# Patient Record
Sex: FEMALE | Race: WHITE | NOT HISPANIC OR LATINO | Employment: UNEMPLOYED | URBAN - METROPOLITAN AREA
[De-identification: names, ages, dates, MRNs, and addresses within clinical notes are randomized per-mention and may not be internally consistent; named-entity substitution may affect disease eponyms.]

---

## 2017-05-19 ENCOUNTER — HOSPITAL ENCOUNTER (OUTPATIENT)
Dept: ULTRASOUND IMAGING | Facility: HOSPITAL | Age: 22
Discharge: HOME | End: 2017-05-19
Attending: FAMILY MEDICINE | Admitting: FAMILY MEDICINE

## 2019-05-29 ENCOUNTER — CONVERSION ENCOUNTER (OUTPATIENT)
Dept: FAMILY MEDICINE CLINIC | Facility: CLINIC | Age: 24
End: 2019-05-29

## 2019-06-04 VITALS
BODY MASS INDEX: 51.96 KG/M2 | DIASTOLIC BLOOD PRESSURE: 82 MMHG | SYSTOLIC BLOOD PRESSURE: 129 MMHG | OXYGEN SATURATION: 97 % | HEIGHT: 61 IN | HEART RATE: 77 BPM | WEIGHT: 275.2 LBS

## 2019-06-06 NOTE — PROGRESS NOTES
Visit Type:  Acute Visit  Referring Provider:  HALEY Alvarez  Primary Provider:  Karen    CC:  abdomen pain, shoulder pain, anxiety/depression and obesity.    History of Present Illness:  Pt is here today with abdominal pain, anxiety, and right shoulder pain. Pt was on nexium in the past before she had her son which helped her nausea and GERD, but she stopped when she became pregnant. She states that she had issues with her gall bladder   when she was younger, and she was concerned that it could be related to that.  Pt states that she is having frequent diarrhea.  She has BMs 4-5 times a day.  Sometimes they are formed.  She states that she has to have a BM within 30 mins of eating. She has   tried changing her diet around by avoiding caffeine, carbination, dairy, fatty foods, and spicy foods.  Pain has improved some, but she is still experiencing some of the nausea and pain.  Pain is a 5/10 everytime she eats, and is in the RUQ and epigastric   region. Her mother has Crohns.    Pt is requesting a referral to a therapist for increased anxiety/depression.  Her father was in an accident earlier this year and she has had issues coping with it. She states that she has struggled with depression since she was a teenager.  She denies   having thoughts of suicide at this time, but has in the past.      Pt also has c/o right shoulder pain.  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  She woke up one day in extreme pain and was not able to move her shoulder well.  In January she was given muscle relaxers and naproxen, which helped,   but she is still experiencing soreness daily. She recently moved, and the pain has worsened from the lifting.       Past Medical History:     Reviewed history from 11/06/2014 and no changes required:        ADHD        Hypertension        Obesity        GERD    Past Surgical History:     Reviewed history from 09/27/2017 and no changes required:        Left  foot - took out plantars warts        Right foot - shots for plantars warts, acid           [facility]        Social History:     Reviewed history from 2017 and no changes required:        Patient is a former smoker.        Passive Smoke: N        Alcohol Use: N        Drug Use: N        Regular Exercise: N            Social History Summary:  Patient is a former smoker.  Passive Smoke: N  Alcohol Use: N  Drug Use: N  Regular Exercise: N  Social History Reviewed: 2019        Active Medications (reviewed today):  TETRACYCLINE  MG ORAL CAPSULE (TETRACYCLINE HCL) 1 p.o. twice a day for 10 days START 5 days before surgery  ADDERALL XR 20 MG ORAL CAPSULE EXTENDED RELEASE 24 HOUR (AMPHETAMINE-DEXTROAMPHETAMINE) take 1 po daily    Current Allergies:  No known allergies    Current Medications (including medications started today):   LEXAPRO 10 MG ORAL TABLET (ESCITALOPRAM OXALATE) Take 1 tablet by mouth daily  OMEPRAZOLE 20 MG ORAL CAPSULE DELAYED RELEASE (OMEPRAZOLE) Take one by mouth daily  CYCLOBENZAPRINE HCL 5 MG ORAL TABLET (CYCLOBENZAPRINE HCL) take 1 tablet every 8 hours as needed for muscle spasms      Risk Factors:     Smoked Tobacco Use:  Former smoker  Passive smoke exposure:  no  Drug use:  no  Caffeine use:  <1 drinks per day  Alcohol use:  no  Exercise:  no  Seatbelt use:  100 %  Sun Exposure:  occasionally      Review of Systems     General       Denies fever and chills.    Eyes       Denies eye irritation, blurring and eye pain.    CV       Denies chest pain or discomfort, racing/skipping heart beats, lightheadedness and palpitations.    Resp       Denies cough, shortness of breath, chest discomfort and wheezing.    GI       Complains of nausea, abdominal pain and diarrhea.           Denies kidney pain and painful urination.    MS       Complains of joint pain.    Derm       Denies rash.    Neuro       Denies numbness and tingling.    Psych       Complains of anxiety and  depression.       Denies thoughts of suicide.    Allergy       Denies seasonal allergies.      Vital Signs:    Patient Profile:    23 Years Old Female  Height:     61 inches (154.94 cm)  Weight:     275.2 pounds  BMI:        51.99     O2 Sat:     97 %  Temp:       97.8 degrees F oral  Pulse rate: 77 / minute  BP Sittin / 82  (left arm)    Cuff size:  large    Medications: Medications were reviewed with the patient during this visit.  No Known Allergy.        Vitals Entered By: Maribel Horvath CMA (May 29, 2019 3:22 PM)      Physical Exam    General:      well developed, well nourished, in no acute distress.  Obese  Head:      normocephalic and atraumatic.    Eyes:      PERRL/EOM intact, conjunctiva and sclera clear with out nystagmus.    Neck:      no masses, thyromegaly, or abnormal cervical nodes.    Lungs:      clear bilaterally to auscultation.    Heart:      non-displaced PMI, chest non-tender; regular rate and rhythm, S1, S2 without murmurs, rubs, or gallops  Abdomen:       normal bowel sounds; no hepatosplenomegaly no ventral,umbilical hernias or masses noted.  tender RUQ and epigastric  Msk:      no crepitus with movement of right shoulder.  Pain upon palpation of right posterior shoulder.  Normal ROM.  Pulses:      normal radial pulses  Neurologic:      no focal deficits  Skin:      intact without lesions or rashes.    Psych:      depressed affect.  tearful.    Diabetes Management Exam:      Foot Exam (with socks and/or shoes not present):        Pulses:           normal radial pulses      Blood Pressure:  Today's BP: 129/82 mm Hg            Impression & Recommendations:    Problem # 1:  Depression / anxiety (ICD-300.4) (ZQS85-P56.8)  Assessment: New  major severe depression   denies suicidal ideation  start lexapro  follow up in 1 month  referral to counseling  Orders:  Counseling Referral (CounsREF)  [facility] THYROID STIMULATING HORMONE (TSH) (TSH)      Problem # 2:  Shoulder pain, right (ICD-719.41)  (CZO92-S05.511)  Assessment: New  most like muscular   alternate ibuprofen and tylenol every 6 hours  PT referral  flexeril as needed    Problem # 3:  ABDOMINAL PAIN (ICD-789.00) (DMT65-Y59.9)  Assessment: New  unknown etiology- possibly GERD or Gallbladder  US RUQ  start omeprazole daily   Avoid overeating, eat small portions at meals and snacks. Avoid chewing gum, chocolate, caffeine, spicy foods, alcohol, coffee, peppermint, and acidic foods.  Elevate head of bed. Avoid eating within 2 hours of bedtime.  Sit up for 30 minutes after eating   meals.  obtain labs    Her updated medication list for this problem includes:     Cyclobenzaprine Hcl 5 Mg Oral Tablet (Cyclobenzaprine hcl) ..... Take 1 tablet every 8 hours as needed for muscle spasms    Orders:  US RIGHT UPPER QUANDRANT (Gallbladder) (CPT-36499)      Problem # 4:  Obesity (ICD-278.00) (WLR77-O58.9)  Assessment: New  obtain labs  work on diet and exercise    Orders:  [facility] THYROID STIMULATING HORMONE (TSH) (TSH)  [facility] CBC W/DIFF; PATH REVIEW IF INDICATED (CBC)  [facility] HEMOGLOBIN A1c (A1DCA)  [facility] LIPID PANEL (LIPID)  [facility] COMPREHENSIVE METABOLIC PANEL (CMP) (MPC)      Medications Added to Medication List This Visit:  1)  Lexapro 10 Mg Oral Tablet (Escitalopram oxalate) .... Take 1 tablet by mouth daily  2)  Omeprazole 20 Mg Oral Capsule Delayed Release (Omeprazole) .... Take one by mouth daily  3)  Cyclobenzaprine Hcl 5 Mg Oral Tablet (Cyclobenzaprine hcl) .... Take 1 tablet every 8 hours as needed for muscle spasms      Patient Instructions:  1)  follow up in 1 month  2)  start taking lexapro daily- will take 4-6 weeks to see full effects  3)  call for worsening symptoms or thoughts of suicide  4)  referral to [facility]  5)  Start taking omeprazole daily   6)  Avoid overeating, eat small portions at meals and snacks. Avoid chewing gum, chocolate, caffeine, spicy foods, alcohol, coffee, peppermint, and acidic foods.  Elevate head of bed. Avoid eating within 2 hours  of bedtime.  Sit up for 30 minutes after   eating meals.  7)  RUQ US ordered  8)  obtain labs  9)  Flexeril as needed for muscle spasms  10)  physical therapy for shoulder pain  11)  warm compress as needed  12)  alternate ibuprofen and tylenol for pain and inflammation every 6 hours  13)  please give a work note stating that patient may need multiple breaks a day for medical necessity.            Medications:  LEXAPRO 10 MG ORAL TABLET (ESCITALOPRAM OXALATE) Take 1 tablet by mouth daily  #30[Tablet] x 1      Entered and Authorized by:  Silvana DE LEON      Electronically signed by:   Silvana DE LEON on 05/29/2019      Method used:    Electronically [facility]               [facility] [id number] (retail)              [address], [place]  [id number]              Ph: [phone]              Fax: [phone]      Note to Pharmacy: Route: ORAL;       RxID:   [id number]  OMEPRAZOLE 20 MG ORAL CAPSULE DELAYED RELEASE (OMEPRAZOLE) Take one by mouth daily  #30[Capsule] x 0      Entered and Authorized by:  Silvana DE LEON      Electronically signed by:   Silvana DE LEON on 05/29/2019      Method used:    [facility]               [facility] [id number] ([place])              [address], [place]  [id number]              Ph: [phone]              Fax: [phone]      Note to Pharmacy: Route: ORAL;       RxID:   [id number]  CYCLOBENZAPRINE HCL 5 MG ORAL TABLET (CYCLOBENZAPRINE HCL) take 1 tablet every 8 hours as needed for muscle spasms  #30[Tablet] x 0      Entered and Authorized by:  Silvana DE LEON      Electronically signed by:   Silvana DE LEON on 05/29/2019      Method used:    Electronically to               [facility] [id number]* (retail)              [address] [facility], [place]  [id number]              Ph: [phone]              Fax: [phone]      Note to Pharmacy: Route:  ORAL;       RxID:   [id number]                Medication Administration    Orders Added:  1)  Ofc Vst, Est Level IV [70301]  2)  Counseling Referral [CounsREF]  3)  Physical Therapy Consult [PTOC]  4)  [facility] THYROID STIMULATING HORMONE (TSH) [TSH]  5)  [facility] CBC W/DIFF; PATH REVIEW IF INDICATED [CBC]  6)  [facility] HEMOGLOBIN A1c [A1DCA]  7)  [facility] LIPID PANEL [LIPID]  8)  [facility] COMPREHENSIVE METABOLIC PANEL (CMP) [MPC]  9)   RIGHT UPPER QUANDRANT (Gallbladder) [CPT-92464]  ]    Patient Health Questionnaire    PHQ-2   1. Over the last 2 weeks how often have you been bothered by any of the following problems?     a. Little interest or pleasure in doing things?...More than half the days     b. Feeling down, depressed, irritable, or hopeless?...Nearly every day    Total PHQ-2 Score: 5       c. Trouble falling asleep, staying asleep, or sleeping too much?...Not at all     d. Feeling tired, or having little energy?...Nearly every day     e. Poor appetite or overeating?...Nearly every day     f.  Feeling bad about yourself - or feeling that you are a failure, or that you have let yourself or your family         down?...Nearly every day     g. Trouble concentrating on things such as reading the newspaper or watching television?...Nearly every day     h. Moving or speaking so slowly that other people could have noticed. Or the opposite - being so fidgety or          restless that you were moving around a lot more than usual?...Nearly every day     i.  Thoughts that you would be better off dead, or of hurting yourself in some way?...Nearly every day    Total PHQ-9 Score: 23    2. If you are experiencing any of the problems on this form, how difficult have these problems made it for you to do your work, take care of things at home or get along with other people?   ...Very difficult    Problems:    Added new problem of Depression / anxiety (ICD-300.4) (QAD95-W33.8)  Added new problem of Obesity (ICD-278.00) (VQE18-N00.9)  Added new  problem of Shoulder pain, right (ICD-719.41) (DPO24-P41.511)          Electronically signed by Silvana DE LEON on 05/29/2019 at 9:17 PM  ________________________________________________________________________       Disclaimer: Converted Note message may not contain all data elements that existed in the legacy source system. Please see [employer] for the original note details.

## 2019-06-25 RX ORDER — OMEPRAZOLE 20 MG/1
CAPSULE, DELAYED RELEASE ORAL
Qty: 30 CAPSULE | Refills: 0 | Status: SHIPPED | OUTPATIENT
Start: 2019-06-25 | End: 2020-01-10

## 2019-06-26 PROBLEM — N60.01 BREAST CYST, RIGHT: Status: ACTIVE | Noted: 2017-08-24

## 2019-06-26 PROBLEM — F41.8 MIXED ANXIETY DEPRESSIVE DISORDER: Status: ACTIVE | Noted: 2019-05-29

## 2019-06-26 PROBLEM — K21.9 GASTROESOPHAGEAL REFLUX DISEASE: Status: ACTIVE | Noted: 2017-08-24

## 2019-06-26 PROBLEM — M25.511 SHOULDER PAIN, RIGHT: Status: ACTIVE | Noted: 2019-05-29

## 2019-06-26 PROBLEM — F90.9 ADHD: Status: ACTIVE | Noted: 2019-06-26

## 2019-07-09 ENCOUNTER — TELEPHONE (OUTPATIENT)
Dept: FAMILY MEDICINE CLINIC | Facility: CLINIC | Age: 24
End: 2019-07-09

## 2019-07-09 NOTE — TELEPHONE ENCOUNTER
June 26, 2019 would have been her 3rd No Show. 06/12/19 (appt with you) and 12/21/17(appt with Abril). Warning letters were sent. Do you want to dismiss or send a Final Warning Letter?

## 2019-07-10 ENCOUNTER — TELEPHONE (OUTPATIENT)
Dept: FAMILY MEDICINE CLINIC | Facility: CLINIC | Age: 24
End: 2019-07-10

## 2019-07-11 ENCOUNTER — TELEPHONE (OUTPATIENT)
Dept: FAMILY MEDICINE CLINIC | Facility: CLINIC | Age: 24
End: 2019-07-11

## 2019-07-15 ENCOUNTER — TELEPHONE (OUTPATIENT)
Dept: FAMILY MEDICINE CLINIC | Facility: CLINIC | Age: 24
End: 2019-07-15

## 2019-07-15 DIAGNOSIS — Z34.90 PREGNANCY, UNSPECIFIED GESTATIONAL AGE: Primary | ICD-10-CM

## 2019-07-16 ENCOUNTER — LAB (OUTPATIENT)
Dept: FAMILY MEDICINE CLINIC | Facility: CLINIC | Age: 24
End: 2019-07-16

## 2019-07-16 DIAGNOSIS — Z34.90 PREGNANCY, UNSPECIFIED GESTATIONAL AGE: ICD-10-CM

## 2019-07-16 LAB — HCG INTACT+B SERPL-ACNC: <1 MIU/ML

## 2019-07-16 PROCEDURE — 84702 CHORIONIC GONADOTROPIN TEST: CPT | Performed by: FAMILY MEDICINE

## 2019-07-16 PROCEDURE — 36415 COLL VENOUS BLD VENIPUNCTURE: CPT | Performed by: FAMILY MEDICINE

## 2019-07-17 ENCOUNTER — OFFICE VISIT (OUTPATIENT)
Dept: FAMILY MEDICINE CLINIC | Facility: CLINIC | Age: 24
End: 2019-07-17

## 2019-07-17 VITALS
HEIGHT: 61 IN | WEIGHT: 278 LBS | DIASTOLIC BLOOD PRESSURE: 82 MMHG | BODY MASS INDEX: 52.49 KG/M2 | OXYGEN SATURATION: 97 % | HEART RATE: 83 BPM | SYSTOLIC BLOOD PRESSURE: 118 MMHG

## 2019-07-17 DIAGNOSIS — N92.6 IRREGULAR MENSTRUAL CYCLE: Primary | ICD-10-CM

## 2019-07-17 DIAGNOSIS — Z30.013 ENCOUNTER FOR INITIAL PRESCRIPTION OF INJECTABLE CONTRACEPTIVE: ICD-10-CM

## 2019-07-17 DIAGNOSIS — R11.0 NAUSEA: ICD-10-CM

## 2019-07-17 DIAGNOSIS — F41.8 MIXED ANXIETY DEPRESSIVE DISORDER: ICD-10-CM

## 2019-07-17 PROCEDURE — 99214 OFFICE O/P EST MOD 30 MIN: CPT | Performed by: NURSE PRACTITIONER

## 2019-07-17 PROCEDURE — 96372 THER/PROPH/DIAG INJ SC/IM: CPT | Performed by: NURSE PRACTITIONER

## 2019-07-17 RX ORDER — ESCITALOPRAM OXALATE 20 MG/1
20 TABLET ORAL DAILY
Qty: 30 TABLET | Refills: 2 | Status: SHIPPED | OUTPATIENT
Start: 2019-07-17 | End: 2020-01-10

## 2019-07-17 RX ORDER — MEDROXYPROGESTERONE ACETATE 150 MG/ML
150 INJECTION, SUSPENSION INTRAMUSCULAR ONCE
Status: COMPLETED | OUTPATIENT
Start: 2019-07-17 | End: 2019-07-17

## 2019-07-17 RX ADMIN — MEDROXYPROGESTERONE ACETATE 150 MG: 150 INJECTION, SUSPENSION INTRAMUSCULAR at 10:03

## 2019-07-17 NOTE — PROGRESS NOTES
Subjective   Reema Schumacher is a 24 y.o. female.     Pt is here today with c/o irregular menstrual cycles.  She has not had a period since 6/4.  She had the mirena up until February, but had it removed due to mood swings, pelvic pain, and missing periods.  [facility]  She had regular periods after stopping the mirena.  Pt reports that she had an infection prior to having it removed, but only completed one of the antibiotics. She was negative for STDs.  She reports that she has had some occasional cramping, but no intense pelvic pain, discharge, or foul smells.  She has been using condoms for protection and birth control.  This is her first irregular period, but she is wanting to get on some BC.  She reports that she had a normal pap smear in Sept of 2017.    Pt was started on lexapro a couple of months ago.  She states that her anxiety and depression have  Improved, but she does feel like she could use a stronger dose. She has anxiety with certain situations, and it doesn't seem to occur daily.  Denies any thoughts of hurting herself or anyone else.  Pt reports that she is still experiencing nausea daily. Last visit we started omeprazole, which has helped some with her diarrhea and other symptoms, but the nausea is still occurring daily.  She wakes up with nausea and has to eat some crackers, and then it occurs again in the evening.          The following portions of the patient's history were reviewed and updated as appropriate: allergies, current medications, past family history, past medical history, past social history, past surgical history and problem list.    Review of Systems   Constitutional: Negative for appetite change, chills, fatigue and fever.   HENT: Positive for congestion and ear pain (recently diagnosed with otitis externa- on meds). Negative for hearing loss, postnasal drip, rhinorrhea, sinus pressure, sore throat and trouble swallowing.    Eyes: Negative for  "blurred vision, double vision, pain, discharge, itching and visual disturbance.   Respiratory: Positive for cough. Negative for chest tightness, shortness of breath and wheezing.    Cardiovascular: Negative for chest pain and palpitations.   Gastrointestinal: Positive for nausea. Negative for abdominal pain, blood in stool, constipation, diarrhea and vomiting.   Endocrine: Negative for polydipsia, polyphagia and polyuria.   Genitourinary: Negative for dysuria, flank pain, frequency and urgency.   Skin: Negative for rash and skin lesions.   Neurological: Negative for dizziness, weakness, numbness and headache.   Psychiatric/Behavioral: Positive for depressed mood. Negative for self-injury, sleep disturbance, suicidal ideas and stress. The patient is nervous/anxious.        Objective   /82 (BP Location: Left arm, Patient Position: Sitting, Cuff Size: Large Adult)   Pulse 83   Ht 154.9 cm (61\")   Wt 126 kg (278 lb)   SpO2 97%   BMI 52.53 kg/m²   Physical Exam   Constitutional: She is oriented to person, place, and time. She appears well-developed and well-nourished. No distress.   HENT:   Head: Normocephalic and atraumatic.   Left Ear: There is swelling and tenderness. A middle ear effusion is present. Decreased hearing is noted.   Left otitis externa   Eyes: Conjunctivae and EOM are normal. Pupils are equal, round, and reactive to light. Right eye exhibits no discharge. Left eye exhibits no discharge.   Neck: Normal range of motion. Neck supple.   Cardiovascular: Normal rate and regular rhythm.   Pulmonary/Chest: Effort normal and breath sounds normal. No respiratory distress. She has no wheezes. She has no rales.   Abdominal: Soft. Normal appearance and bowel sounds are normal. She exhibits no distension. There is no tenderness.   Musculoskeletal: Normal range of motion.   Neurological: She is alert and oriented to person, place, and time.   Skin: Skin is warm and dry. She is not diaphoretic. "   Psychiatric: She has a normal mood and affect. Her behavior is normal. Thought content normal.   Vitals reviewed.        Assessment/Plan   Problems Addressed this Visit        Other    Mixed anxiety depressive disorder    Relevant Medications    escitalopram (LEXAPRO) 20 MG tablet      Other Visit Diagnoses     Irregular menstrual cycle    -  Primary    negative pregnancy test  start depo shot- return every 3 months    Nausea        refer to gastro    Relevant Orders    Ambulatory Referral to Gastroenterology    Encounter for initial prescription of injectable contraceptive        start depo shots today  neg pregnancy blood work yesterday  educ pt to cont to use condoms for 2 weeks for protection  may have irregular periods       Relevant Medications    medroxyPROGESTERone (DEPO-PROVERA) injection 150 mg (Start on 7/17/2019 10:39 AM)              Diagnoses and all orders for this visit:    1. Irregular menstrual cycle (Primary)  Comments:  negative pregnancy test  start depo shot- return every 3 months    2. Nausea  Comments:  refer to gastro  Orders:  -     Ambulatory Referral to Gastroenterology    3. Mixed anxiety depressive disorder  Comments:  increase lexapro to 20 mg  Orders:  -     escitalopram (LEXAPRO) 20 MG tablet; Take 1 tablet by mouth Daily.  Dispense: 30 tablet; Refill: 2    4. Encounter for initial prescription of injectable contraceptive  Comments:  start depo shots today  neg pregnancy blood work yesterday  educ pt to cont to use condoms for 2 weeks for protection  may have irregular periods     Orders:  -     medroxyPROGESTERone (DEPO-PROVERA) injection 150 mg

## 2019-07-17 NOTE — PATIENT INSTRUCTIONS
Increase lexapro to 20 mg  Follow up in 3 months for reevaluation  Start depo-provera shots today  Will need to come back every 3 months for pregnancy test and injection  Gastro referral for nausea  Use condoms for protection and pregnancy prevention for 2 weeks after first shot

## 2019-07-22 ENCOUNTER — HOSPITAL ENCOUNTER (EMERGENCY)
Facility: HOSPITAL | Age: 24
Discharge: HOME | End: 2019-07-22
Attending: NURSE PRACTITIONER | Admitting: EMERGENCY MEDICINE

## 2019-07-22 VITALS
DIASTOLIC BLOOD PRESSURE: 79 MMHG | HEIGHT: 60 IN | BODY MASS INDEX: 55.17 KG/M2 | WEIGHT: 281 LBS | HEART RATE: 91 BPM | RESPIRATION RATE: 18 BRPM | SYSTOLIC BLOOD PRESSURE: 123 MMHG | OXYGEN SATURATION: 98 % | TEMPERATURE: 98.3 F

## 2019-07-22 DIAGNOSIS — F32.0 CURRENT MILD EPISODE OF MAJOR DEPRESSIVE DISORDER WITHOUT PRIOR EPISODE (HCC): ICD-10-CM

## 2019-07-22 DIAGNOSIS — F41.9 ANXIETY: Primary | ICD-10-CM

## 2019-07-22 PROCEDURE — 90471 IMMUNIZATION ADMIN: CPT | Performed by: NURSE PRACTITIONER

## 2019-07-22 PROCEDURE — 90715 TDAP VACCINE 7 YRS/> IM: CPT | Performed by: NURSE PRACTITIONER

## 2019-07-22 PROCEDURE — 99283 EMERGENCY DEPT VISIT LOW MDM: CPT

## 2019-07-22 PROCEDURE — 25010000002 TDAP 5-2.5-18.5 LF-MCG/0.5 SUSPENSION: Performed by: NURSE PRACTITIONER

## 2019-07-22 RX ORDER — LORAZEPAM 0.5 MG/1
0.5 TABLET ORAL EVERY 12 HOURS PRN
Qty: 6 TABLET | Refills: 0 | Status: SHIPPED | OUTPATIENT
Start: 2019-07-22 | End: 2020-01-10

## 2019-07-22 RX ORDER — LORAZEPAM 1 MG/1
1 TABLET ORAL ONCE
Status: COMPLETED | OUTPATIENT
Start: 2019-07-22 | End: 2019-07-22

## 2019-07-22 RX ADMIN — LORAZEPAM 1 MG: 1 TABLET ORAL at 14:26

## 2019-07-22 RX ADMIN — TETANUS TOXOID, REDUCED DIPHTHERIA TOXOID AND ACELLULAR PERTUSSIS VACCINE, ADSORBED 0.5 ML: 5; 2.5; 8; 8; 2.5 SUSPENSION INTRAMUSCULAR at 15:12

## 2019-09-25 ENCOUNTER — HOSPITAL ENCOUNTER (EMERGENCY)
Facility: HOSPITAL | Age: 24
Discharge: PSYCHIATRIC OR SUBSTANCE USE FACILITY | End: 2019-09-25
Attending: EMERGENCY MEDICINE | Admitting: EMERGENCY MEDICINE

## 2019-09-25 VITALS
RESPIRATION RATE: 16 BRPM | OXYGEN SATURATION: 97 % | DIASTOLIC BLOOD PRESSURE: 70 MMHG | BODY MASS INDEX: 48.73 KG/M2 | WEIGHT: 275 LBS | TEMPERATURE: 97.5 F | HEART RATE: 91 BPM | HEIGHT: 63 IN | SYSTOLIC BLOOD PRESSURE: 109 MMHG

## 2019-09-25 DIAGNOSIS — S66.922A EXTENSOR TENDON LACERATION OF LEFT WRIST WITH OPEN WOUND, INITIAL ENCOUNTER: ICD-10-CM

## 2019-09-25 DIAGNOSIS — R45.851 SUICIDAL IDEATION: Primary | ICD-10-CM

## 2019-09-25 DIAGNOSIS — S61.502A EXTENSOR TENDON LACERATION OF LEFT WRIST WITH OPEN WOUND, INITIAL ENCOUNTER: ICD-10-CM

## 2019-09-25 DIAGNOSIS — F10.10 ALCOHOL ABUSE: ICD-10-CM

## 2019-09-25 LAB
AMPHET+METHAMPHET UR QL: NEGATIVE
B-HCG UR QL: NEGATIVE
BACTERIA UR QL AUTO: ABNORMAL /HPF
BARBITURATES UR QL SCN: NEGATIVE
BENZODIAZ UR QL SCN: NEGATIVE
BILIRUB UR QL STRIP: NEGATIVE
CANNABINOIDS SERPL QL: POSITIVE
CLARITY UR: CLEAR
COCAINE UR QL: NEGATIVE
COLOR UR: YELLOW
CREAT UR-MCNC: <20 MG/DL
GLUCOSE UR STRIP-MCNC: NEGATIVE MG/DL
HGB UR QL STRIP.AUTO: ABNORMAL
HYALINE CASTS UR QL AUTO: ABNORMAL /LPF
KETONES UR QL STRIP: NEGATIVE
LEUKOCYTE ESTERASE UR QL STRIP.AUTO: NEGATIVE
METHADONE UR QL SCN: NEGATIVE
NITRITE UR QL STRIP: NEGATIVE
OPIATES UR QL: NEGATIVE
PCP UR QL SCN: NEGATIVE
PH UR STRIP.AUTO: 6 [PH] (ref 5–8)
PROT UR QL STRIP: NEGATIVE
RBC # UR: ABNORMAL /HPF
REF LAB TEST METHOD: ABNORMAL
SP GR UR STRIP: <=1.005 (ref 1–1.03)
SQUAMOUS #/AREA URNS HPF: ABNORMAL /HPF
UROBILINOGEN UR QL STRIP: ABNORMAL
WBC UR QL AUTO: ABNORMAL /HPF

## 2019-09-25 PROCEDURE — 80307 DRUG TEST PRSMV CHEM ANLYZR: CPT | Performed by: EMERGENCY MEDICINE

## 2019-09-25 PROCEDURE — 82570 ASSAY OF URINE CREATININE: CPT | Performed by: EMERGENCY MEDICINE

## 2019-09-25 PROCEDURE — 81025 URINE PREGNANCY TEST: CPT | Performed by: EMERGENCY MEDICINE

## 2019-09-25 PROCEDURE — 81001 URINALYSIS AUTO W/SCOPE: CPT | Performed by: EMERGENCY MEDICINE

## 2019-09-25 PROCEDURE — 99284 EMERGENCY DEPT VISIT MOD MDM: CPT

## 2019-09-25 NOTE — ED NOTES
"Called [facility] to give report on pt, nurse refused to take report because she states \"we don't have a signed consent form yet\". Consent was faxed and I will attempt to call and give report. Pt is already accepted by Dr. Odonnell and has room assignment and ems called for transport during night shift.      Dominique Griffin RN  09/25/19 [id number]    "

## 2019-10-10 ENCOUNTER — TELEPHONE (OUTPATIENT)
Dept: FAMILY MEDICINE CLINIC | Facility: CLINIC | Age: 24
End: 2019-10-10

## 2019-10-10 NOTE — TELEPHONE ENCOUNTER
On 10/09/2019 this would be the 3rd No Show. Letters were mailed on 06/12/19 and 12/21/17. Do you want to dismiss or send a Final Warning Letter?

## 2020-01-10 ENCOUNTER — OFFICE VISIT (OUTPATIENT)
Dept: FAMILY MEDICINE CLINIC | Facility: CLINIC | Age: 25
End: 2020-01-10

## 2020-01-10 VITALS
HEIGHT: 63 IN | HEART RATE: 90 BPM | WEIGHT: 288 LBS | OXYGEN SATURATION: 97 % | DIASTOLIC BLOOD PRESSURE: 87 MMHG | SYSTOLIC BLOOD PRESSURE: 119 MMHG | BODY MASS INDEX: 51.03 KG/M2

## 2020-01-10 DIAGNOSIS — N63.0 BREAST MASS: Primary | ICD-10-CM

## 2020-01-10 DIAGNOSIS — R10.9 ABDOMINAL CRAMPING: ICD-10-CM

## 2020-01-10 PROCEDURE — 99213 OFFICE O/P EST LOW 20 MIN: CPT | Performed by: NURSE PRACTITIONER

## 2020-01-10 RX ORDER — DICYCLOMINE HYDROCHLORIDE 10 MG/1
10 CAPSULE ORAL
Qty: 90 CAPSULE | Refills: 0 | Status: SHIPPED | OUTPATIENT
Start: 2020-01-10 | End: 2020-07-28

## 2020-01-10 NOTE — PROGRESS NOTES
"Subjective   Reema Schumacher is a 24 y.o. female.     Pt is here today with c/o breast cysts.  Pt states that she has had to have cysts surgically removed in the past and has developed them in the same area again.  She states that she already had a referral but missed her appointment and needs a new one.  She has had these cysts off and on for 4 years.  It got inflammed again a couple of weeks ago.  The redness and swelling has improved.  No fever or chills.  Has some discomfort in the area when it is inflamed.  Cysts typically appear on the medial side of each breast near the breast bone.  Pt does not want an antibiotic at this time because they have not helped in the past.  Pt states that she continues to have some abdominal discomfort.  She stopped taking her omeprazole because it was not helping  She is going to make an appt with GI.  She goes between diarrhea and constipation.  Gets gas pains every time she eats.       The following portions of the patient's history were reviewed and updated as appropriate: allergies, current medications, past family history, past medical history, past social history, past surgical history and problem list.    Review of Systems   Constitutional: Negative for chills, fatigue and fever.   Respiratory: Negative for chest tightness and shortness of breath.    Cardiovascular: Negative for chest pain and palpitations.   Gastrointestinal: Positive for abdominal pain (gas pain) and nausea. Negative for vomiting.   Genitourinary: Positive for breast lump.   Neurological: Negative for dizziness and headache.       Objective   /87 (BP Location: Left arm, Patient Position: Sitting, Cuff Size: Large Adult)   Pulse 90   Ht 160 cm (63\")   Wt 131 kg (288 lb)   SpO2 97%   BMI 51.02 kg/m²   Physical Exam   Constitutional: She is oriented to person, place, and time. She appears well-developed and well-nourished. No distress.   HENT:   Head: Normocephalic and atraumatic.   Eyes: Pupils " are equal, round, and reactive to light. EOM are normal.   Neck: Normal range of motion. Neck supple.   Cardiovascular: Normal rate, regular rhythm and normal heart sounds.   Pulmonary/Chest: Effort normal and breath sounds normal. No respiratory distress. She has no wheezes.       Abdominal: Soft. Bowel sounds are normal. She exhibits no distension and no mass. There is no tenderness.   Neurological: She is alert and oriented to person, place, and time.   Psychiatric: She has a normal mood and affect. Her behavior is normal. Judgment and thought content normal.         Assessment/Plan     Diagnoses and all orders for this visit:    1. Breast mass (Primary)  Comments:  cyst vs abscess  referral to gen surg  refuses abx  warm compress  Orders:  -     Ambulatory Referral to General Surgery    2. Abdominal cramping  Comments:  possibly IBS  making appt with GI  start bentyl    Orders:  -     dicyclomine (BENTYL) 10 MG capsule; Take 1 capsule by mouth 4 (Four) Times a Day Before Meals & at Bedtime.  Dispense: 90 capsule; Refill: 0

## 2020-01-10 NOTE — PATIENT INSTRUCTIONS
[facility]- referral in June   Start taking bentyl up to 4 times a day as needed for abdominal discomfort  Referral to general surgery for breast abscess or cyst  Warm compress  Keep area clean and dry  Call if it becomes red or inflamed for antibiotics

## 2020-02-12 ENCOUNTER — OFFICE (OUTPATIENT)
Dept: URBAN - METROPOLITAN AREA CLINIC 64 | Facility: CLINIC | Age: 25
End: 2020-02-12

## 2020-02-12 ENCOUNTER — HOSPITAL ENCOUNTER (OUTPATIENT)
Facility: HOSPITAL | Age: 25
Setting detail: HOSPITAL OUTPATIENT SURGERY
End: 2020-02-12
Attending: INTERNAL MEDICINE | Admitting: INTERNAL MEDICINE

## 2020-02-12 VITALS
HEART RATE: 85 BPM | HEIGHT: 61 IN | DIASTOLIC BLOOD PRESSURE: 84 MMHG | SYSTOLIC BLOOD PRESSURE: 134 MMHG | WEIGHT: 287 LBS

## 2020-02-12 DIAGNOSIS — K58.0 IRRITABLE BOWEL SYNDROME WITH DIARRHEA: ICD-10-CM

## 2020-02-12 DIAGNOSIS — K21.9 GASTRO-ESOPHAGEAL REFLUX DISEASE WITHOUT ESOPHAGITIS: ICD-10-CM

## 2020-02-12 DIAGNOSIS — R11.0 NAUSEA: ICD-10-CM

## 2020-02-12 PROCEDURE — 99203 OFFICE O/P NEW LOW 30 MIN: CPT | Performed by: NURSE PRACTITIONER

## 2020-02-12 RX ORDER — RIFAXIMIN 550 MG/1
1650 TABLET ORAL
Qty: 42 | Refills: 0 | Status: COMPLETED
Start: 2020-02-12 | End: 2021-01-28

## 2020-02-12 RX ORDER — ONDANSETRON HYDROCHLORIDE 4 MG/1
16 TABLET, FILM COATED ORAL
Qty: 40 | Refills: 0 | Status: COMPLETED
Start: 2020-02-12 | End: 2021-01-28

## 2020-02-12 RX ORDER — OMEPRAZOLE 40 MG/1
40 CAPSULE, DELAYED RELEASE ORAL
Qty: 30 | Refills: 11 | Status: COMPLETED
Start: 2020-02-12 | End: 2021-01-28

## 2020-02-18 ENCOUNTER — HOSPITAL ENCOUNTER (EMERGENCY)
Facility: HOSPITAL | Age: 25
Discharge: HOME | End: 2020-02-18
Attending: EMERGENCY MEDICINE | Admitting: EMERGENCY MEDICINE

## 2020-02-18 VITALS
RESPIRATION RATE: 14 BRPM | HEART RATE: 97 BPM | SYSTOLIC BLOOD PRESSURE: 137 MMHG | WEIGHT: 291.67 LBS | DIASTOLIC BLOOD PRESSURE: 86 MMHG | TEMPERATURE: 98.6 F | OXYGEN SATURATION: 99 % | HEIGHT: 61 IN | BODY MASS INDEX: 55.07 KG/M2

## 2020-02-18 DIAGNOSIS — L03.311 ABDOMINAL WALL CELLULITIS: Primary | ICD-10-CM

## 2020-02-18 PROCEDURE — 99282 EMERGENCY DEPT VISIT SF MDM: CPT

## 2020-02-18 RX ORDER — CEPHALEXIN 250 MG/1
250 CAPSULE ORAL 3 TIMES DAILY
Qty: 21 CAPSULE | Refills: 0 | Status: SHIPPED | OUTPATIENT
Start: 2020-02-18 | End: 2020-07-28

## 2020-02-18 RX ORDER — SULFAMETHOXAZOLE AND TRIMETHOPRIM 800; 160 MG/1; MG/1
1 TABLET ORAL 2 TIMES DAILY
Qty: 14 TABLET | Refills: 0 | Status: SHIPPED | OUTPATIENT
Start: 2020-02-18 | End: 2020-07-28

## 2020-02-18 NOTE — ED PROVIDER NOTES
Subjective   Patient is a 24-year-old female complaining of lower abdominal wall pain that developed over the past 2 days.  She states she has a history of skin abscesses in the past.  She denies fever vomiting diarrhea or other associated complaints.          Review of Systems  Negative for headache earache sore throat cough fever chest pain vomiting diarrhea dysuria or other complaint  Past Medical History:   Diagnosis Date   • ADHD    • Depression with anxiety    • Family history of diabetes mellitus (DM)    • Family history of heart disease    • GERD (gastroesophageal reflux disease)    • Hypertension    • Obesity    • Plantar wart of right foot        No Known Allergies    Past Surgical History:   Procedure Laterality Date   •  SECTION  2016   • PLANTAR'S WART EXCISION Bilateral        No family history on file.    Social History     Socioeconomic History   • Marital status: Single     Spouse name: Not on file   • Number of children: Not on file   • Years of education: Not on file   • Highest education level: Not on file   Tobacco Use   • Smoking status: Former Smoker   Substance and Sexual Activity   • Alcohol use: Yes     Comment: occ   • Drug use: No   • Sexual activity: Yes           Objective   Physical Exam  Lungs are clear.  Heart has regular rhythm.  Abdomen is soft.  She does have induration and erythema to her lower pannus.  There is no palpable fluctuance noted.  Procedures           ED Course                                           MDM  Number of Diagnoses or Management Options  Diagnosis management comments: Patient has findings consistent with abdominal wall cellulitis.  Patient will be discharged and will be placed on Keflex and Bactrim DS as well as Naprosyn.  She will follow with MD for recheck.    Risk of Complications, Morbidity, and/or Mortality  Presenting problems: moderate  Diagnostic procedures: moderate  Management options: moderate    Patient Progress  Patient progress:  stable      Final diagnoses:   Abdominal wall cellulitis            Kip Johnson MD  02/18/20 0811

## 2020-03-06 ENCOUNTER — HOSPITAL ENCOUNTER (EMERGENCY)
Facility: HOSPITAL | Age: 25
Discharge: HOME | End: 2020-03-06
Admitting: EMERGENCY MEDICINE

## 2020-03-06 ENCOUNTER — APPOINTMENT (OUTPATIENT)
Dept: ULTRASOUND IMAGING | Facility: HOSPITAL | Age: 25
End: 2020-03-06

## 2020-03-06 VITALS
OXYGEN SATURATION: 98 % | DIASTOLIC BLOOD PRESSURE: 80 MMHG | HEIGHT: 61 IN | WEIGHT: 282.19 LBS | SYSTOLIC BLOOD PRESSURE: 125 MMHG | BODY MASS INDEX: 53.28 KG/M2 | HEART RATE: 77 BPM | TEMPERATURE: 98.6 F | RESPIRATION RATE: 18 BRPM

## 2020-03-06 DIAGNOSIS — N61.1 ABSCESS OF SKIN OF BREAST: Primary | ICD-10-CM

## 2020-03-06 PROCEDURE — 99283 EMERGENCY DEPT VISIT LOW MDM: CPT

## 2020-03-06 PROCEDURE — 25010000002 KETOROLAC TROMETHAMINE PER 15 MG: Performed by: PHYSICIAN ASSISTANT

## 2020-03-06 PROCEDURE — 87076 CULTURE ANAEROBE IDENT EACH: CPT | Performed by: PHYSICIAN ASSISTANT

## 2020-03-06 PROCEDURE — 96372 THER/PROPH/DIAG INJ SC/IM: CPT

## 2020-03-06 PROCEDURE — 87070 CULTURE OTHR SPECIMN AEROBIC: CPT | Performed by: PHYSICIAN ASSISTANT

## 2020-03-06 PROCEDURE — 25010000002 HYDROMORPHONE PER 4 MG: Performed by: PHYSICIAN ASSISTANT

## 2020-03-06 PROCEDURE — 87205 SMEAR GRAM STAIN: CPT | Performed by: PHYSICIAN ASSISTANT

## 2020-03-06 PROCEDURE — 76642 ULTRASOUND BREAST LIMITED: CPT

## 2020-03-06 RX ORDER — HYDROCODONE BITARTRATE AND ACETAMINOPHEN 5; 325 MG/1; MG/1
1 TABLET ORAL EVERY 6 HOURS PRN
Qty: 10 TABLET | Refills: 0 | Status: SHIPPED | OUTPATIENT
Start: 2020-03-06 | End: 2020-07-28

## 2020-03-06 RX ORDER — KETOROLAC TROMETHAMINE 30 MG/ML
30 INJECTION, SOLUTION INTRAMUSCULAR; INTRAVENOUS ONCE
Status: COMPLETED | OUTPATIENT
Start: 2020-03-06 | End: 2020-03-06

## 2020-03-06 RX ORDER — CLINDAMYCIN HYDROCHLORIDE 300 MG/1
300 CAPSULE ORAL 3 TIMES DAILY
Qty: 30 CAPSULE | Refills: 0 | Status: SHIPPED | OUTPATIENT
Start: 2020-03-06 | End: 2020-07-28

## 2020-03-06 RX ORDER — HYDROMORPHONE HCL 110MG/55ML
0.5 PATIENT CONTROLLED ANALGESIA SYRINGE INTRAVENOUS ONCE
Status: COMPLETED | OUTPATIENT
Start: 2020-03-06 | End: 2020-03-06

## 2020-03-06 RX ORDER — ONDANSETRON 4 MG/1
4 TABLET, ORALLY DISINTEGRATING ORAL EVERY 8 HOURS PRN
Qty: 20 TABLET | Refills: 0 | Status: SHIPPED | OUTPATIENT
Start: 2020-03-06 | End: 2020-07-28

## 2020-03-06 RX ORDER — HYDROMORPHONE HCL 110MG/55ML
PATIENT CONTROLLED ANALGESIA SYRINGE INTRAVENOUS
Status: DISCONTINUED
Start: 2020-03-06 | End: 2020-03-06 | Stop reason: HOSPADM

## 2020-03-06 RX ADMIN — KETOROLAC TROMETHAMINE 30 MG: 30 INJECTION, SOLUTION INTRAMUSCULAR at 15:00

## 2020-03-06 RX ADMIN — HYDROMORPHONE HYDROCHLORIDE 0.5 MG: 2 INJECTION INTRAMUSCULAR; INTRAVENOUS; SUBCUTANEOUS at 16:04

## 2020-03-06 NOTE — DISCHARGE INSTRUCTIONS
Take clindamycin to completion.  Take Norco as needed for pain.  May also take ibuprofen or Tylenol as needed.  Do not mix ibuprofen with Advil, Aleve, Motrin, diclofenac or naproxen.  Take Zofran as needed for nausea.  Keep area clean, wash gently with soap and water.  Slowly remove packing about 1 cm each day until completely removed.  Apply warm compresses to the area to help with swelling and pain.  20 minutes on 20 minutes off.    Follow-up with primary care as needed for new or worsening concerns, recommend seeing them in the next 2 weeks to evaluate skin infection to ensure that it has not progressed.  Call, schedule appointment with Dr. Olivia, general surgery for further evaluation management.    Return to the ER for new or worsening symptoms, increased breast pain, swelling, drainage, bleeding, fever chills or other signs of spreading infection.

## 2020-03-06 NOTE — ED PROVIDER NOTES
"Subjective   Patient is a 24-year-old  female with history of recurrent superficial cyst and abscess presents the ER per personal vehicle with chief complaint \"I think I have a abscess underneath my left breast\".  Patient states that she has history of recurrent abscess that she frequently has drained, packed and takes antibiotic medication for.  Patient states her PCP sent her to surgeon, Dr. Olivia for evaluation, was told she has sebaceous cyst and may need surgery.  Patient states that she has not followed up on surgery due to cost.  Patient states she started having discomfort underneath her left breast 3 to 4 days ago, progressively getting worse.  Patient states the pressure of wearing her bra increases her pain, she has taken ibuprofen and Tylenol at home with no relief, she is also tried warm compresses to express the abscess with no relief.  Patient states she was just seen in the ER about 3 weeks ago for similar complaints for an infection on her abdomen, was discharged with Keflex and Bactrim at that time, she finished them a few days ago.  Patient denies any chest pain, shortness of breath, abdominal pain, nausea vomiting headache or fever chills.      History provided by:  Patient      Review of Systems   Constitutional: Negative for chills and fever.   HENT: Negative for sore throat and trouble swallowing.    Respiratory: Negative for shortness of breath and wheezing.    Cardiovascular: Negative for chest pain.   Gastrointestinal: Negative for abdominal pain, diarrhea, nausea and vomiting.   Genitourinary: Negative for dysuria.   Skin: Positive for color change and rash.        Abscess underneath L breast   Neurological: Negative for weakness and headaches.   Psychiatric/Behavioral: Negative for behavioral problems.   All other systems reviewed and are negative.      Past Medical History:   Diagnosis Date   • ADHD    • Depression with anxiety    • Family history of diabetes mellitus (DM)  "   • Family history of heart disease    • GERD (gastroesophageal reflux disease)    • Hypertension    • Obesity    • Plantar wart of right foot        No Known Allergies    Past Surgical History:   Procedure Laterality Date   •  SECTION  2016   • PLANTAR'S WART EXCISION Bilateral        No family history on file.    Social History     Socioeconomic History   • Marital status: Single     Spouse name: Not on file   • Number of children: Not on file   • Years of education: Not on file   • Highest education level: Not on file   Tobacco Use   • Smoking status: Former Smoker   Substance and Sexual Activity   • Alcohol use: Yes     Comment: occ   • Drug use: No   • Sexual activity: Yes           Objective   Physical Exam   Constitutional: She is oriented to person, place, and time. She appears well-developed and well-nourished.  Non-toxic appearance. She does not appear ill.   Morbidly obese   HENT:   Head: Normocephalic and atraumatic.   Eyes: Pupils are equal, round, and reactive to light. EOM are normal.   Cardiovascular: Normal rate, regular rhythm, normal heart sounds and intact distal pulses.   Pulmonary/Chest: Effort normal and breath sounds normal.   Abdominal: Soft. Normal appearance and bowel sounds are normal. She exhibits no distension. There is no tenderness. There is no CVA tenderness.   Musculoskeletal: She exhibits tenderness.   Neurological: She is alert and oriented to person, place, and time.   Skin: Skin is warm and dry. Capillary refill takes less than 2 seconds. Rash noted. There is erythema.   Erythematous indurated area underneath left breast measuring 8 cm x 4 cm with central area of fluctuance. Induration surrounding area of fluctuance and up on to lateral aspect left nipple; tenderness palpation lateral aspect left nipple, no nipple discharge or bleeding   Psychiatric: She has a normal mood and affect. Her behavior is normal.   Nursing note and vitals reviewed.      Incision &  "Drainage  Date/Time: 3/6/2020 4:23 PM  Performed by: Mer Koroma PA  Authorized by: Mer Koroma PA     Consent:     Consent obtained:  Verbal    Consent given by:  Patient    Risks discussed:  Bleeding, incomplete drainage and infection    Alternatives discussed:  No treatment and delayed treatment  Location:     Type:  Abscess    Size:  3 x 4 cm    Location:  Trunk    Trunk location:  L breast  Pre-procedure details:     Skin preparation:  Betadine  Anesthesia (see MAR for exact dosages):     Anesthesia method:  Local infiltration    Local anesthetic:  Lidocaine 1% WITH epi (3 cc)  Procedure type:     Complexity:  Simple  Procedure details:     Needle aspiration: no      Incision types:  Single straight    Incision depth:  Subcutaneous    Scalpel blade:  11    Wound management:  Probed and deloculated and irrigated with saline    Drainage:  Serosanguinous and bloody    Drainage amount:  Moderate    Wound treatment:  Drain placed    Packing materials:  1/4 in gauze    Amount 1/4\":  8 cm  Post-procedure details:     Patient tolerance of procedure:  Tolerated well, no immediate complications               ED Course    /80 (BP Location: Left arm, Patient Position: Lying)   Pulse 77   Temp 98.6 °F (37 °C) (Oral)   Resp 18   Ht 154.9 cm (61\")   Wt 128 kg (282 lb 3 oz)   SpO2 98%   BMI 53.32 kg/m²   Labs Reviewed   WOUND CULTURE     Medications   ketorolac (TORADOL) injection 30 mg (30 mg Intramuscular Given 3/6/20 1500)   HYDROmorphone (DILAUDID) injection 0.5 mg (0.5 mg Intramuscular Given 3/6/20 1604)     Us Breast Left Limited    Result Date: 3/6/2020  IMPRESSION : The palpable abnormality within the left breast appears to correspond to an abscess. The fluid appears very heterogeneous and echogenic consistent with phlegmon and would likely need open incision and drainage as opposed to needle aspiration.  Electronically Signed ByEmerald Hong On:3/6/2020 3:28 PM This report was finalized on " [id number] by  Chanell Hong .                                           MDM  Number of Diagnoses or Management Options  Abscess of skin of breast:   Diagnosis management comments: MEDICAL DECISION  Epic Chart Review: Patient seen on 2/18/20 for abdominal wall cellulitis, discharged with Keflex and Bactrim; no I&D at that time  Comorbidities: Patient denies  Differentials: Abscess, ductal involvement, cellulitis; this list is not all inclusive and does not constitute the entirety of considered causes  Radiology interpretation:  Images reviewed by me and interpreted by radiologist,   US Left Breast  Final result by Chanell Hong MD (03/06/20 15:28:13)             Impression:     IMPRESSION :   The palpable abnormality within the left breast appears to correspond to  an abscess. The fluid appears very heterogeneous and echogenic  consistent with phlegmon and would likely need open incision and  drainage as opposed to needle aspiration.     Electronically Signed By-Chanell Hong On:3/6/2020 3:28 PM  This report was finalized on [id number] by  Chanell Hong, .        Narrative:        DATE OF EXAM:   3/6/2020 3:11 PM     PROCEDURE:   US BREAST LEFT LIMITED-     INDICATIONS:   abscess L breast     COMPARISON:  Ultrasounds 05/19/2017     TECHNIQUE:   Sonographic gray scale and Doppler imaging of the left breast was  obtained.     FINDINGS:   Sonographic evaluation of the palpable abnormality within the 4 to 6:00  position of the left breast approximate 4 cm from the nipple  demonstrates a heterogeneous fluid collection seen just deep to the skin  surface measuring up to 4.5 x 2.4 x 3.7 cm. This does not extend to the  nipple region. Surrounding hyperemia is present, likely related to  cellulitis and infectious changes. The fluid appears significantly  echogenic and heterogeneous with probable debris and phlegmonous changes  present.    Lab interpretation:  Labs viewed by me significant for, wound culture  pending    Patient seen and evaluated by myself in emergency room.  On initial exam patient has significant erythema, fluctuance induration that is been beneath left breast to the lateral aspect of left nipple with tenderness to palpation.  Patient was given Toradol 30 mg IM.  Ultrasound left breast was obtained to rule out deep infection or tracking to the nipple that could have required surgical intervention.  Ultrasound showed area of fluid that is heterogeneous and echogenic consistent with abscess in left breast, no abnormalities of nipple.  Incision and drainage was performed as indicated above procedure note.  Patient tolerated the procedure well.  Patient was given 0.5 mg Dilaudid IM for pain as she expressed that during I&D she was in excruciating pain.  Patient states that she did not drive herself to the ER, would have a ride home.  Patient's abscess was packed with quarter inch gauze, patient states that she has had abscess drained and packed multiple times, knows how to care for drainage, however this was explained to her again.  Patient was discharged with prescription for Norco, clindamycin and Zofran.  Clindamycin was chosen because patient had just been treated with Keflex and Bactrim for abdominal wall cellulitis about 2 to 3 weeks ago and had just finished these antibiotics.  Cultures were obtained and pending.  Patient reported significant improvement in her pain and pressure in the left breast immediately following I&D.  Patient was strongly encouraged to follow-up with Dr. Jones for further evaluation management and to ensure that infection was improving over the next week to 2 weeks.  Patient states that she had appointment with Dr. Olivia, general surgery a few weeks ago but states she could not go because she could not afford the co-pay.  Strongly encouraged her to follow-up with Dr. Olivia for further evaluation and possible sebaceous cyst removal to prevent further infections  and abscess.  Patient verbalized understanding.    Discharge plan and instructions were discussed with the patient who verbalized understanding and is in agreement with the plan, all questions were answered at this time.  Patient is aware of signs symptoms that would require immediate return to the emergency room.  Patient understands importance of following up with primary care provider for further evaluation and worsening concerns as well as blood pressure recheck in the next 4 weeks.    Patient remained afebrile, nontoxic-appearing, no acute respiratory distress throughout entire emergency room stay.  Patient was discharged in improved stable condition with an upright steady gait.         Amount and/or Complexity of Data Reviewed  Clinical lab tests: ordered  Tests in the radiology section of CPT®: reviewed and ordered    Patient Progress  Patient progress: improved      Final diagnoses:   Abscess of skin of breast            Mer Koroma PA  03/06/20 9399

## 2020-03-10 LAB
BACTERIA SPEC AEROBE CULT: ABNORMAL
GRAM STN SPEC: ABNORMAL

## 2020-03-10 NOTE — PROGRESS NOTES
ER Culture Follow Up:    Pt presented to ED on 3/6 with CC: abscess. I&D with culture was performed. Patient was discharged on clindamycin 300 mg PO TID x 10 days. Results of cultures are light growth of P.granulosum.     Per [facility] Guide, clindamycin has + coverage for p.acnes. I called patient to follow up and ensure relief of symptoms.     Patient stated she feels great and denies tenderness, redness, soreness at site and states everything appears to be healing nicely.    Encouraged return to ED for future needs PRN. Discussed with preceptor. No need for further follow up at this time.    Bert Villalba, Pharm D Candidate  03/10/20 15:01

## 2020-05-14 RX ORDER — VENLAFAXINE 50 MG/1
50 TABLET ORAL 2 TIMES DAILY
COMMUNITY
End: 2020-07-28

## 2020-05-16 ENCOUNTER — LAB (OUTPATIENT)
Dept: LAB | Facility: HOSPITAL | Age: 25
End: 2020-05-16

## 2020-05-16 PROCEDURE — C9803 HOPD COVID-19 SPEC COLLECT: HCPCS

## 2020-05-16 PROCEDURE — U0004 COV-19 TEST NON-CDC HGH THRU: HCPCS

## 2020-05-18 LAB
REF LAB TEST METHOD: NORMAL
SARS-COV-2 RNA RESP QL NAA+PROBE: NOT DETECTED

## 2020-05-19 ENCOUNTER — INPATIENT HOSPITAL (OUTPATIENT)
Dept: URBAN - METROPOLITAN AREA HOSPITAL 84 | Facility: HOSPITAL | Age: 25
End: 2020-05-19

## 2020-05-19 ENCOUNTER — HOSPITAL ENCOUNTER (OUTPATIENT)
Facility: HOSPITAL | Age: 25
Setting detail: HOSPITAL OUTPATIENT SURGERY
Discharge: HOME | End: 2020-05-19
Attending: INTERNAL MEDICINE | Admitting: INTERNAL MEDICINE

## 2020-05-19 ENCOUNTER — ANESTHESIA EVENT (OUTPATIENT)
Dept: GASTROENTEROLOGY | Facility: HOSPITAL | Age: 25
End: 2020-05-19

## 2020-05-19 ENCOUNTER — ANESTHESIA (OUTPATIENT)
Dept: GASTROENTEROLOGY | Facility: HOSPITAL | Age: 25
End: 2020-05-19

## 2020-05-19 VITALS
DIASTOLIC BLOOD PRESSURE: 65 MMHG | HEIGHT: 61 IN | OXYGEN SATURATION: 99 % | SYSTOLIC BLOOD PRESSURE: 110 MMHG | WEIGHT: 280 LBS | HEART RATE: 71 BPM | BODY MASS INDEX: 52.87 KG/M2 | TEMPERATURE: 98 F | RESPIRATION RATE: 20 BRPM

## 2020-05-19 DIAGNOSIS — K29.50 UNSPECIFIED CHRONIC GASTRITIS WITHOUT BLEEDING: ICD-10-CM

## 2020-05-19 DIAGNOSIS — K29.80 DUODENITIS WITHOUT BLEEDING: ICD-10-CM

## 2020-05-19 DIAGNOSIS — K25.9 GASTRIC ULCER, UNSPECIFIED AS ACUTE OR CHRONIC, WITHOUT HEMO: ICD-10-CM

## 2020-05-19 DIAGNOSIS — K21.9 GASTRO-ESOPHAGEAL REFLUX DISEASE WITHOUT ESOPHAGITIS: ICD-10-CM

## 2020-05-19 DIAGNOSIS — R19.7 DIARRHEA, UNSPECIFIED: ICD-10-CM

## 2020-05-19 DIAGNOSIS — R11.2 NAUSEA WITH VOMITING, UNSPECIFIED: ICD-10-CM

## 2020-05-19 DIAGNOSIS — K26.9 DUODENAL ULCER, UNSPECIFIED AS ACUTE OR CHRONIC, WITHOUT HEM: ICD-10-CM

## 2020-05-19 DIAGNOSIS — R11.0 NAUSEA: ICD-10-CM

## 2020-05-19 DIAGNOSIS — K21.9 GERD (GASTROESOPHAGEAL REFLUX DISEASE): ICD-10-CM

## 2020-05-19 PROCEDURE — 43239 EGD BIOPSY SINGLE/MULTIPLE: CPT | Performed by: INTERNAL MEDICINE

## 2020-05-19 PROCEDURE — 88305 TISSUE EXAM BY PATHOLOGIST: CPT | Performed by: INTERNAL MEDICINE

## 2020-05-19 PROCEDURE — 88342 IMHCHEM/IMCYTCHM 1ST ANTB: CPT | Performed by: INTERNAL MEDICINE

## 2020-05-19 PROCEDURE — 25010000002 PROPOFOL 10 MG/ML EMULSION: Performed by: ANESTHESIOLOGY

## 2020-05-19 RX ORDER — SODIUM CHLORIDE 0.9 % (FLUSH) 0.9 %
3 SYRINGE (ML) INJECTION EVERY 12 HOURS SCHEDULED
Status: DISCONTINUED | OUTPATIENT
Start: 2020-05-19 | End: 2020-05-19 | Stop reason: HOSPADM

## 2020-05-19 RX ORDER — SODIUM CHLORIDE 0.9 % (FLUSH) 0.9 %
10 SYRINGE (ML) INJECTION AS NEEDED
Status: DISCONTINUED | OUTPATIENT
Start: 2020-05-19 | End: 2020-05-19 | Stop reason: HOSPADM

## 2020-05-19 RX ORDER — SODIUM CHLORIDE 0.9 % (FLUSH) 0.9 %
3-10 SYRINGE (ML) INJECTION AS NEEDED
Status: DISCONTINUED | OUTPATIENT
Start: 2020-05-19 | End: 2020-05-19 | Stop reason: HOSPADM

## 2020-05-19 RX ORDER — ONDANSETRON 2 MG/ML
4 INJECTION INTRAMUSCULAR; INTRAVENOUS ONCE AS NEEDED
Status: DISCONTINUED | OUTPATIENT
Start: 2020-05-19 | End: 2020-05-19 | Stop reason: HOSPADM

## 2020-05-19 RX ORDER — PROPOFOL 10 MG/ML
VIAL (ML) INTRAVENOUS AS NEEDED
Status: DISCONTINUED | OUTPATIENT
Start: 2020-05-19 | End: 2020-05-19 | Stop reason: SURG

## 2020-05-19 RX ORDER — SODIUM CHLORIDE 9 MG/ML
9 INJECTION, SOLUTION INTRAVENOUS CONTINUOUS PRN
Status: DISCONTINUED | OUTPATIENT
Start: 2020-05-19 | End: 2020-05-19 | Stop reason: HOSPADM

## 2020-05-19 RX ADMIN — PROPOFOL 300 MG: 10 INJECTION, EMULSION INTRAVENOUS at 09:31

## 2020-05-19 RX ADMIN — SODIUM CHLORIDE 9 ML/HR: 900 INJECTION, SOLUTION INTRAVENOUS at 09:19

## 2020-05-19 NOTE — OP NOTE
ESOPHAGOGASTRODUODENOSCOPY Procedure Report    Patient Name:  Reema Schumacher  YOB: 1995    Date of Surgery:  5/19/2020     Pre-Op Diagnosis:  NAUSEA, GERD, diarrhea       Post-Op Diagnosis Codes:  Gastric ulcers  Gastritis  Duodenal ulcers  Duodenitis      Procedure/CPT® Codes:      Procedure(s):  ESOPHAGOGASTRODUODENOSCOPY WITH BIOPSY X2 AREAS    Staff:  Surgeon(s):  Kenney Hodgson MD      Anesthesia: Monitored Anesthesia Care    Description of Procedure:  A description of the procedure as well as risks, benefits and alternative methods were explained to the patient who voiced understanding and signed the corresponding consent form. A physical exam was performed and vital signs were monitored throughout the procedure.    An upper GI endoscope was placed into the mouth and proceeded through the esophagus, stomach and second portion of the duodenum without difficulty. The scope was then retroflexed and the fundus was visualized. The procedure was not difficult and there were no immediate complications.    Impression:  1.  Normal esophageal mucosa entire esophagus  2.  Multiple clean-based gastric ulcers 5 mm in the antrum, cold forcep biopsies of the antrum and body were taken for histopathology and H. pylori  3.  Erythema throughout the antrum and body consistent with underlying gastritis.  4.  Erythema and erosions in the duodenal bulb suggestive of erosive duodenitis  5.  Multiple clean-based duodenal ulcers at the sweep from D1 into D2.  6.  Normal duodenal mucosa visualized in D2 and D3, cold forcep biopsies were taken and sent for histopathology    Recommendations:  P.o. daily PPI  Avoid NSAIDs and blood thinners  Follow-up with biopsy results and treat H. pylori if positive  Follow-up in clinic in 2 weeks with a nurse practitioner to discuss      Kenney Hodgson MD     Date: 5/19/2020    Time: 09:43

## 2020-05-19 NOTE — DISCHARGE INSTRUCTIONS
A responsible adult should stay with you and you should rest quietly for the rest of the day.    Do not drink alcohol, drive, operate any heavy machinery or power tools or make any legal/important decisions for the next 24 hours.     Progress your diet as tolerated.  If you begin to experience severe pain, increased shortness of breath, racing heartbeat or a fever above 101 F, seek immediate medical attention.     Follow up with MD as instructed. Call office for results in 3 to 5 days if needed.

## 2020-05-19 NOTE — ANESTHESIA POSTPROCEDURE EVALUATION
Patient: Reema Schumacher    Procedure Summary     Date:  05/19/20 Room / Location:  [facility] ENDOSCOPY 1 / [facility] ENDOSCOPY    Anesthesia Start:  0928 Anesthesia Stop:  0944    Procedure:  ESOPHAGOGASTRODUODENOSCOPY WITH BIOPSY X2 AREAS (N/A ) Diagnosis:       Nausea      GERD (gastroesophageal reflux disease)      (NAUSEA, GERD)    Surgeon:  Kenney Hodgson MD Provider:  Som Nance MD    Anesthesia Type:  MAC ASA Status:  3          Anesthesia Type: MAC    Vitals  Vitals Value Taken Time   /65 5/19/2020 10:05 AM   Temp     Pulse 79 5/19/2020 10:09 AM   Resp 20 5/19/2020 10:05 AM   SpO2 97 % 5/19/2020 10:09 AM   Vitals shown include unvalidated device data.        Post Anesthesia Care and Evaluation    Patient location during evaluation: PACU  Patient participation: complete - patient participated  Level of consciousness: awake  Pain scale: See nurse's notes for pain score.  Pain management: adequate  Airway patency: patent  Anesthetic complications: No anesthetic complications  PONV Status: none  Cardiovascular status: acceptable  Respiratory status: acceptable  Hydration status: acceptable    Comments: Patient seen and examined postoperatively; vital signs stable; SpO2 greater than or equal to 90%; cardiopulmonary status stable; nausea/vomiting adequately controlled; pain adequately controlled; no apparent anesthesia complications; patient discharged from anesthesia care when discharge criteria were met

## 2020-05-19 NOTE — H&P
GI CONSULT  NOTE:    Referring Provider:    Akshat Jones MD  [unfilled]    Chief complaint: <principal problem not specified>    Subjective .       Pre op diagnosis  NAUSEA, GERD      History of present illness:      Reema Schumacher is a 24 y.o. female who presents today for Procedure(s):  ESOPHAGOGASTRODUODENOSCOPY for the indications listed below.     The updated Patient Profile was reviewed prior to the procedure, in conjunction with the Physical Exam, including medical conditions, surgical procedures, medications, allergies, family history and social history.     Pre-operatively, I reviewed the indication(s) for the procedure, the risks of the procedure [including but not limited to: unexpected bleeding possibly requiring hospitalization and/or unplanned repeat procedures, perforation possibly requiring surgical treatment, missed lesions and complications of sedation/MAC (also explained by anesthesia staff)].     I have evaluated the patient for risks associated with the planned anesthesia and the procedure to be performed and find the patient an acceptable candidate for IV sedation.    Multiple opportunities were provided for any questions or concerns, and all questions were answered satisfactorily before any anesthesia was administered. We will proceed with the planned procedure.    Past Medical History:  Past Medical History:   Diagnosis Date   • ADHD    • Depression with anxiety    • Family history of diabetes mellitus (DM)    • Family history of heart disease    • GERD (gastroesophageal reflux disease)    • Hypertension    • Obesity    • Plantar wart of right foot        Past Surgical History:  Past Surgical History:   Procedure Laterality Date   •  SECTION  2016   • PLANTAR'S WART EXCISION Bilateral    • WISDOM TOOTH EXTRACTION         Social History:  Social History     Tobacco Use   • Smoking status: Former Smoker     Last attempt to quit: 2018     Years since quittin.3  "  • Smokeless tobacco: Never Used   Substance Use Topics   • Alcohol use: Yes     Comment: occ   • Drug use: No       Family History:  History reviewed. No pertinent family history.    Medications:  No medications prior to admission.       Scheduled Meds:  Continuous Infusions:  No current facility-administered medications for this encounter.   PRN Meds:.    ALLERGIES:  Patient has no known allergies.    ROS:  The following systems were reviewed and negative;  Constitution:  No fevers, chills, no unintentional weight loss  Skin: no rash, no jaundice  Eyes:  No blurry vision, no eye pain  HENT:  No change in hearing or smell  Resp:  No dyspnea or cough  CV:  No chest pain or palpitations  :  No dysuria, hematuria  Musculoskeletal:  No leg cramps or arthralgias  Neuro:  No tremor, no numbness  Psych:  No depression or confsuion    Objective     Vital Signs:   Vitals:    05/14/20 1042   Weight: 124 kg (273 lb)   Height: 154.9 cm (61\")       Physical Exam:       General Appearance:    Awake and alert, in no acute distress   Head:    Normocephalic, without obvious abnormality, atraumatic   Throat:   No oral lesions, no thrush, oral mucosa moist   Lungs:     respirations regular, even and unlabored   Skin:   No rash, no jaundice       Results Review:  Lab Results (last 24 hours)     ** No results found for the last 24 hours. **          Imaging Results (Last 24 Hours)     ** No results found for the last 24 hours. **           I reviewed the patient's labs and imaging.    ASSESSMENT AND PLAN:      Active Problems:    * No active hospital problems. *       Procedure(s):  ESOPHAGOGASTRODUODENOSCOPY      I discussed the patients findings and my recommendations with the patient.    Kenney Hodgson MD  05/19/20  08:40              "

## 2020-05-19 NOTE — ANESTHESIA PREPROCEDURE EVALUATION
Anesthesia Evaluation     Patient summary reviewed and Nursing notes reviewed   no history of anesthetic complications:  NPO Solid Status: > 8 hours  NPO Liquid Status: > 8 hours           Airway   Mallampati: II  TM distance: >3 FB  Neck ROM: full  No difficulty expected  Dental      Pulmonary - normal exam   Cardiovascular - normal exam    (+) hypertension,       Neuro/Psych  (+) psychiatric history Anxiety, Depression and ADHD,     GI/Hepatic/Renal/Endo    (+) morbid obesity, GERD poorly controlled,      Musculoskeletal (-) negative ROS    Abdominal   (+) obese,    Substance History - negative use     OB/GYN          Other - negative ROS                       Anesthesia Plan    ASA 3     MAC     intravenous induction     Anesthetic plan, all risks, benefits, and alternatives have been provided, discussed and informed consent has been obtained with: patient.

## 2020-05-20 LAB
LAB AP CASE REPORT: NORMAL
LAB AP CLINICAL INFORMATION: NORMAL
PATH REPORT.FINAL DX SPEC: NORMAL
PATH REPORT.GROSS SPEC: NORMAL

## 2020-05-28 RX ORDER — OMEPRAZOLE 20 MG/1
CAPSULE, DELAYED RELEASE ORAL
Qty: 30 CAPSULE | Refills: 0 | OUTPATIENT
Start: 2020-05-28

## 2020-06-05 ENCOUNTER — OFFICE (OUTPATIENT)
Dept: URBAN - METROPOLITAN AREA CLINIC 64 | Facility: CLINIC | Age: 25
End: 2020-06-05

## 2020-06-05 VITALS
HEIGHT: 61 IN | HEART RATE: 112 BPM | SYSTOLIC BLOOD PRESSURE: 148 MMHG | DIASTOLIC BLOOD PRESSURE: 90 MMHG | WEIGHT: 276 LBS

## 2020-06-05 DIAGNOSIS — K26.9 DUODENAL ULCER, UNSPECIFIED AS ACUTE OR CHRONIC, WITHOUT HEM: ICD-10-CM

## 2020-06-05 DIAGNOSIS — R11.0 NAUSEA: ICD-10-CM

## 2020-06-05 DIAGNOSIS — K25.9 GASTRIC ULCER, UNSPECIFIED AS ACUTE OR CHRONIC, WITHOUT HEMO: ICD-10-CM

## 2020-06-05 DIAGNOSIS — K21.9 GASTRO-ESOPHAGEAL REFLUX DISEASE WITHOUT ESOPHAGITIS: ICD-10-CM

## 2020-06-05 PROCEDURE — 99213 OFFICE O/P EST LOW 20 MIN: CPT | Performed by: NURSE PRACTITIONER

## 2020-06-05 RX ORDER — PANTOPRAZOLE SODIUM 40 MG/1
80 TABLET, DELAYED RELEASE ORAL
Qty: 180 | Refills: 3 | Status: ACTIVE
Start: 2020-06-05

## 2020-07-27 NOTE — PROGRESS NOTES
Subjective   Reema Schumacher is a 25 y.o. female.     Pt is here today with c/o anxiety and depression.  She is needing a refill on her effexor.  She was on lexapro previously but didn't take it long because she reacted poorly to it.  She became suicidal and went to [facility] for 5 days inpatient.  She was started on effexor but didn't have the money for the copay so she didn't take it for about a months,  She started taking effexor 37.5mg BID regularly and she feels like her mood is stable.  She has not had the medication in 5 days and she states that her anxiety has increased.  She has had to take some hydroxyzine recently for her anxiety.  She had to leave work early one day.  Denies drugs or alcohol.  Denies current SI or HI.  Effexor helps her sleep.  Pt sees a counselor.       The following portions of the patient's history were reviewed and updated as appropriate: allergies, current medications, past family history, past medical history, past social history, past surgical history and problem list.    Review of Systems   Constitutional: Negative for appetite change, chills, fatigue and fever.   HENT: Negative for congestion, ear pain, hearing loss, postnasal drip, rhinorrhea, sinus pressure, sore throat, swollen glands and trouble swallowing.    Eyes: Negative for blurred vision, double vision, pain, discharge, itching and visual disturbance.   Respiratory: Negative for cough, chest tightness, shortness of breath and wheezing.    Cardiovascular: Negative for chest pain and palpitations.   Gastrointestinal: Positive for abdominal pain (From stomach ulcers) and nausea (From hx of stomach issues- sees GI, on protonix). Negative for blood in stool, constipation, diarrhea and vomiting.   Endocrine: Negative for polydipsia, polyphagia and polyuria.   Genitourinary: Negative for dysuria, flank pain, frequency and urgency.   Musculoskeletal: Negative for arthralgias, back pain and myalgias.   Skin: Negative for  "rash and skin lesions.   Neurological: Negative for dizziness, weakness, numbness and headache.   Psychiatric/Behavioral: Positive for decreased concentration (Had to leave work early the other day due to anxiety) and sleep disturbance. Negative for agitation, self-injury, suicidal ideas, depressed mood and stress. The patient is nervous/anxious.        Objective   /78 (BP Location: Left arm, Patient Position: Sitting, Cuff Size: Large Adult)   Pulse 84   Temp 98.2 °F (36.8 °C) (Temporal)   Resp 16   Ht 154.9 cm (61\")   Wt 128 kg (282 lb)   LMP 06/28/2020   SpO2 98%   Breastfeeding No   BMI 53.28 kg/m²   Physical Exam   Constitutional: She is oriented to person, place, and time. She appears well-developed. No distress.   Over nourished     HENT:   Head: Normocephalic and atraumatic.   Eyes: Pupils are equal, round, and reactive to light. Conjunctivae and EOM are normal. Right eye exhibits no discharge. Left eye exhibits no discharge.   Neck: Normal range of motion. Neck supple.   Cardiovascular: Normal rate, regular rhythm and normal heart sounds.   No murmur heard.  Pulmonary/Chest: Effort normal and breath sounds normal. No respiratory distress. She has no wheezes. She has no rales.   Abdominal: Soft. Normal appearance and bowel sounds are normal. She exhibits no distension. There is no tenderness.   Musculoskeletal: Normal range of motion.   Neurological: She is alert and oriented to person, place, and time.   Skin: Skin is warm and dry. She is not diaphoretic.   Psychiatric: She has a normal mood and affect. Her behavior is normal. Thought content normal.   Vitals reviewed.        Assessment/Plan     Diagnoses and all orders for this visit:    1. Anxiety (Primary)  Comments:  cont effexor  SI on lexapro  no current SI or HI  f/u in 6 mo  Orders:  -     venlafaxine (EFFEXOR) 37.5 MG tablet; Take 1 tablet by mouth 2 (Two) Times a Day.  Dispense: 180 tablet; Refill: 1    2. Recurrent major depressive " disorder, in partial remission (CMS/HCC)  Comments:  cont effexor  SI on lexapro  no current SI or HI  f/u in 6 mo  Orders:  -     venlafaxine (EFFEXOR) 37.5 MG tablet; Take 1 tablet by mouth 2 (Two) Times a Day.  Dispense: 180 tablet; Refill: 1

## 2020-07-28 ENCOUNTER — OFFICE VISIT (OUTPATIENT)
Dept: FAMILY MEDICINE CLINIC | Facility: CLINIC | Age: 25
End: 2020-07-28

## 2020-07-28 VITALS
SYSTOLIC BLOOD PRESSURE: 114 MMHG | OXYGEN SATURATION: 98 % | RESPIRATION RATE: 16 BRPM | WEIGHT: 282 LBS | BODY MASS INDEX: 53.24 KG/M2 | HEIGHT: 61 IN | HEART RATE: 84 BPM | TEMPERATURE: 98.2 F | DIASTOLIC BLOOD PRESSURE: 78 MMHG

## 2020-07-28 DIAGNOSIS — F33.41 RECURRENT MAJOR DEPRESSIVE DISORDER, IN PARTIAL REMISSION (HCC): ICD-10-CM

## 2020-07-28 DIAGNOSIS — F41.9 ANXIETY: Primary | ICD-10-CM

## 2020-07-28 PROCEDURE — 99213 OFFICE O/P EST LOW 20 MIN: CPT | Performed by: NURSE PRACTITIONER

## 2020-07-28 RX ORDER — VENLAFAXINE 37.5 MG/1
37.5 TABLET ORAL 2 TIMES DAILY
Qty: 180 TABLET | Refills: 1 | Status: SHIPPED | OUTPATIENT
Start: 2020-07-28 | End: 2021-01-29 | Stop reason: DRUGHIGH

## 2020-07-28 RX ORDER — PANTOPRAZOLE SODIUM 20 MG/1
20 TABLET, DELAYED RELEASE ORAL 2 TIMES DAILY
COMMUNITY
End: 2021-05-04

## 2020-07-28 RX ORDER — VENLAFAXINE 37.5 MG/1
1 TABLET ORAL 2 TIMES DAILY
COMMUNITY
Start: 2020-05-13 | End: 2020-07-28 | Stop reason: SDUPTHER

## 2020-07-28 NOTE — PATIENT INSTRUCTIONS
Continue effexor  Call for any issues or concers  [facility]

## 2020-08-21 ENCOUNTER — HOSPITAL ENCOUNTER (EMERGENCY)
Facility: HOSPITAL | Age: 25
Discharge: HOME | End: 2020-08-21
Attending: EMERGENCY MEDICINE | Admitting: EMERGENCY MEDICINE

## 2020-08-21 VITALS
BODY MASS INDEX: 54.19 KG/M2 | RESPIRATION RATE: 16 BRPM | HEIGHT: 61 IN | OXYGEN SATURATION: 99 % | HEART RATE: 99 BPM | SYSTOLIC BLOOD PRESSURE: 135 MMHG | TEMPERATURE: 99.6 F | WEIGHT: 287 LBS | DIASTOLIC BLOOD PRESSURE: 87 MMHG

## 2020-08-21 DIAGNOSIS — H60.501 ACUTE OTITIS EXTERNA OF RIGHT EAR, UNSPECIFIED TYPE: Primary | ICD-10-CM

## 2020-08-21 PROCEDURE — 99282 EMERGENCY DEPT VISIT SF MDM: CPT

## 2020-08-21 RX ORDER — HYDROCODONE BITARTRATE AND ACETAMINOPHEN 7.5; 325 MG/1; MG/1
1 TABLET ORAL EVERY 4 HOURS PRN
Qty: 10 TABLET | Refills: 0 | OUTPATIENT
Start: 2020-08-21 | End: 2020-12-05

## 2020-08-21 NOTE — DISCHARGE INSTRUCTIONS
Continue antibiotic eardrops.  Keep water out of ear.  Return for increased pain, fever, vomiting or any other concerns

## 2020-08-21 NOTE — ED PROVIDER NOTES
Subjective   History of Present Illness  Right ear pain  [age]-year-old female has had right ear pain for last 1 week.  She was started on Cipro eardrops 2 days ago for otitis externa.  She denies trauma or fevers or chills or vomiting.  States the severe pain has persisted.  Review of Systems   Constitutional: Negative.    HENT: Positive for ear pain. Negative for ear discharge.    Eyes: Negative.    Respiratory: Negative.    Neurological: Negative.        Past Medical History:   Diagnosis Date   • ADHD    • Depression with anxiety    • Family history of diabetes mellitus (DM)    • Family history of heart disease    • GERD (gastroesophageal reflux disease)    • Hypertension    • Obesity    • Plantar wart of right foot        No Known Allergies    Past Surgical History:   Procedure Laterality Date   •  SECTION  2016   • ENDOSCOPY N/A 2020    Procedure: ESOPHAGOGASTRODUODENOSCOPY WITH BIOPSY X2 AREAS;  Surgeon: Kenney Hodgson MD;  Location: [facility] ENDOSCOPY;  Service: Gastroenterology;  Laterality: N/A;  duodenitis, duodenal ulcers, gastric ulcers   • PLANTAR'S WART EXCISION Bilateral    • WISDOM TOOTH EXTRACTION         No family history on file.    Social History     Socioeconomic History   • Marital status: Single     Spouse name: Not on file   • Number of children: Not on file   • Years of education: Not on file   • Highest education level: Not on file   Tobacco Use   • Smoking status: Former Smoker     Last attempt to quit: 2018     Years since quittin.6   • Smokeless tobacco: Never Used   Substance and Sexual Activity   • Alcohol use: Yes     Comment: occ   • Drug use: No   • Sexual activity: Defer       Prior to Admission medications    Medication Sig Start Date End Date Taking? Authorizing Provider   ciprofloxacin-hydrocortisone (CIPRO HC OTIC) 0.2-1 % otic suspension Administer 3 drops to the right ear 2 (Two) Times a Day. 20  Yes Provider, MD Shazia   HYDROcodone-acetaminophen  "(NORCO) 7.5-325 MG per tablet Take 1 tablet by mouth Every 4 (Four) Hours As Needed for Moderate Pain . 8/21/20   Neftaly Henning MD   pantoprazole (PROTONIX) 20 MG EC tablet Take 20 mg by mouth 2 (Two) Times a Day.    Provider, MD Shazia   venlafaxine (EFFEXOR) 37.5 MG tablet Take 1 tablet by mouth 2 (Two) Times a Day. 7/28/20   Silvana Martin, APRN     /88 (BP Location: Left arm, Patient Position: Sitting)   Pulse 109   Temp 99.6 °F (37.6 °C) (Oral)   Resp 14   Ht 154.9 cm (61\")   Wt 130 kg (287 lb)   LMP 08/12/2020   SpO2 98%   Breastfeeding No   BMI 54.23 kg/m²   I examined the patient using the appropriate personal protective equipment.        Objective   Physical Exam  General: Well-appearing, no acute distress  Psych: Oriented, pleasant affect  Respirations: Clear, nonlabored respirations  Skin: No rash, normal color  Left ear is normal right ear the external canal slightly edematous there is no active drainage the TM is clear mastoid process is normal, neck is supple no rigidity  Procedures           ED Course                                           MDM  Patient started the antibiotic drops 2 days ago she was advised to continue those drops and was prescribed a short course of Norco for the discomfort of the external otitis.  Inspect report was ordered.  She does not have any signs of complication at this point.  She has no signs of systemic toxicity or sepsis or meningitis.  She was given warning signs for return and discharged in good condition  Final diagnoses:   Acute otitis externa of right ear, unspecified type            Neftaly Henning MD  08/21/20 1433    "

## 2020-12-05 PROCEDURE — U0003 INFECTIOUS AGENT DETECTION BY NUCLEIC ACID (DNA OR RNA); SEVERE ACUTE RESPIRATORY SYNDROME CORONAVIRUS 2 (SARS-COV-2) (CORONAVIRUS DISEASE [COVID-19]), AMPLIFIED PROBE TECHNIQUE, MAKING USE OF HIGH THROUGHPUT TECHNOLOGIES AS DESCRIBED BY CMS-2020-01-R: HCPCS | Performed by: NURSE PRACTITIONER

## 2021-01-28 ENCOUNTER — TRANSCRIBE ORDERS (OUTPATIENT)
Dept: ADMINISTRATIVE | Facility: HOSPITAL | Age: 26
End: 2021-01-28

## 2021-01-28 ENCOUNTER — OFFICE (OUTPATIENT)
Dept: URBAN - METROPOLITAN AREA CLINIC 64 | Facility: CLINIC | Age: 26
End: 2021-01-28

## 2021-01-28 VITALS
HEIGHT: 61 IN | DIASTOLIC BLOOD PRESSURE: 99 MMHG | WEIGHT: 293 LBS | HEART RATE: 104 BPM | SYSTOLIC BLOOD PRESSURE: 173 MMHG

## 2021-01-28 DIAGNOSIS — R19.7 DIARRHEA, UNSPECIFIED: ICD-10-CM

## 2021-01-28 DIAGNOSIS — Z87.19 H/O GASTROESOPHAGEAL REFLUX (GERD): Primary | ICD-10-CM

## 2021-01-28 DIAGNOSIS — R10.9 UNSPECIFIED ABDOMINAL PAIN: ICD-10-CM

## 2021-01-28 DIAGNOSIS — K21.9 GASTRO-ESOPHAGEAL REFLUX DISEASE WITHOUT ESOPHAGITIS: ICD-10-CM

## 2021-01-28 DIAGNOSIS — R11.0 NAUSEA: ICD-10-CM

## 2021-01-28 DIAGNOSIS — K26.9 DUODENAL ULCER, UNSPECIFIED AS ACUTE OR CHRONIC, WITHOUT HEM: ICD-10-CM

## 2021-01-28 PROCEDURE — 99213 OFFICE O/P EST LOW 20 MIN: CPT | Performed by: NURSE PRACTITIONER

## 2021-01-29 ENCOUNTER — OFFICE VISIT (OUTPATIENT)
Dept: FAMILY MEDICINE CLINIC | Facility: CLINIC | Age: 26
End: 2021-01-29

## 2021-01-29 VITALS
SYSTOLIC BLOOD PRESSURE: 151 MMHG | HEART RATE: 110 BPM | BODY MASS INDEX: 59.71 KG/M2 | DIASTOLIC BLOOD PRESSURE: 83 MMHG | OXYGEN SATURATION: 97 % | TEMPERATURE: 96.4 F | WEIGHT: 293 LBS

## 2021-01-29 DIAGNOSIS — M79.672 INTRACTABLE LEFT HEEL PAIN: ICD-10-CM

## 2021-01-29 DIAGNOSIS — E66.01 OBESITY, MORBID, BMI 50 OR HIGHER (HCC): ICD-10-CM

## 2021-01-29 DIAGNOSIS — F41.9 ANXIETY: Primary | ICD-10-CM

## 2021-01-29 PROCEDURE — 99214 OFFICE O/P EST MOD 30 MIN: CPT | Performed by: FAMILY MEDICINE

## 2021-01-29 RX ORDER — VENLAFAXINE HYDROCHLORIDE 75 MG/1
75 CAPSULE, EXTENDED RELEASE ORAL DAILY
Qty: 90 CAPSULE | Refills: 1 | Status: SHIPPED | OUTPATIENT
Start: 2021-01-29 | End: 2021-07-07 | Stop reason: SDUPTHER

## 2021-01-29 NOTE — PROGRESS NOTES
Subjective   Reema Schumacher is a 25 y.o. female.     She is in for follow-up on some anxiety issues.  She has obesity but is beginning the process to work on weight loss through our bariatric clinic. She has plans to get healthier as she is engaged and wants to get pregnant in the near future. She has not had any chest pain or dyspnea. She has not had any issue with bowel or bladder function of note. She has not had any dizziness or lightheadedness. She feels her mood / anxiety is in a good place on current treatment. Her concern today is some pain in the L heel, and she thinks she may have a spur. She does not recall any particular trauma, and the pain is making activity more uncomfortable.         /83 (BP Location: Left arm, Patient Position: Sitting, Cuff Size: Large Adult)   Pulse 110   Temp 96.4 °F (35.8 °C) (Temporal)   Wt (!) 143 kg (316 lb)   SpO2 97%   BMI 59.71 kg/m²       Chief Complaint   Patient presents with   • Anxiety     6 month f/u - started the process for weight loss surgery w/  & also saw C[facility] at [facility]    • Foot Pain     left heel pain            Current Outpatient Medications:   •  pantoprazole (PROTONIX) 20 MG EC tablet, Take 20 mg by mouth 2 (Two) Times a Day., Disp: , Rfl:   •  promethazine-dextromethorphan (PROMETHAZINE-DM) 6.25-15 MG/5ML syrup, Take 5 mL by mouth At Night As Needed for Cough., Disp: 90 mL, Rfl: 0  •  venlafaxine (EFFEXOR) 37.5 MG tablet, Take 1 tablet by mouth 2 (Two) Times a Day., Disp: 180 tablet, Rfl: 1        The following portions of the patient's history were reviewed and updated as appropriate: allergies, current medications, past family history, past medical history, past social history, past surgical history and problem list.    Review of Systems   Constitutional: Negative for activity change, fatigue and fever.   HENT: Negative for congestion, sinus pressure, sinus pain, sore throat and trouble swallowing.    Eyes: Negative for visual  disturbance.   Respiratory: Negative for chest tightness, shortness of breath and wheezing.    Cardiovascular: Negative for chest pain.   Gastrointestinal: Negative for abdominal distention, abdominal pain, constipation, diarrhea, nausea and vomiting.   Genitourinary: Negative for difficulty urinating and dysuria.   Musculoskeletal: Negative for back pain and neck pain.   Neurological: Negative for dizziness, weakness and light-headedness.   Psychiatric/Behavioral: Negative for agitation, decreased concentration, hallucinations, sleep disturbance and suicidal ideas.       Objective   Physical Exam  Vitals signs and nursing note reviewed.   Constitutional:       Appearance: She is obese.   Neck:      Musculoskeletal: Neck supple.   Cardiovascular:      Rate and Rhythm: Normal rate and regular rhythm.      Heart sounds: Normal heart sounds. No murmur.   Pulmonary:      Effort: Pulmonary effort is normal.      Breath sounds: No wheezing or rales.   Abdominal:      General: Bowel sounds are normal.      Palpations: Abdomen is soft.      Tenderness: There is no abdominal tenderness. There is no guarding.   Musculoskeletal:      Right lower leg: No edema.      Left lower leg: No edema.      Comments: Soreness in L heel, consistent with spur  Gait normal    Skin:     General: Skin is warm.      Findings: No rash.   Neurological:      General: No focal deficit present.      Mental Status: She is alert and oriented to person, place, and time.      Deep Tendon Reflexes: Reflexes normal.   Psychiatric:         Mood and Affect: Mood normal.           Assessment/Plan   Problems Addressed this Visit     None      Visit Diagnoses     Anxiety    -  Primary    Intractable left heel pain        Relevant Orders    Ambulatory Referral to Podiatry (Completed)    Obesity, morbid, BMI 50 or higher (CMS/[facility])          Diagnoses       Codes Comments    Anxiety    -  Primary ICD-10-CM: F41.9  ICD-9-CM: 300.00     Intractable left heel pain      ICD-10-CM: M79.672  ICD-9-CM: 729.5     Obesity, morbid, BMI 50 or higher (CMS/[facility])     ICD-10-CM: E66.01  ICD-9-CM: 278.01           I will refer to podiatry for the heel pain issue  I will update labs  I advised on diet / exercise changes she can pursue  I will follow along with our bariatric clinic and see her later in the year for weight /bp monitoring

## 2021-02-03 ENCOUNTER — HOSPITAL ENCOUNTER (OUTPATIENT)
Dept: ULTRASOUND IMAGING | Facility: HOSPITAL | Age: 26
Discharge: HOME | End: 2021-02-03
Admitting: NURSE PRACTITIONER

## 2021-02-03 ENCOUNTER — HOSPITAL ENCOUNTER (OUTPATIENT)
Dept: NUCLEAR MEDICINE | Facility: HOSPITAL | Age: 26
Discharge: HOME | End: 2021-02-03

## 2021-02-03 DIAGNOSIS — Z87.19 H/O GASTROESOPHAGEAL REFLUX (GERD): ICD-10-CM

## 2021-02-03 PROCEDURE — 0 TECHNETIUM TC 99M MEBROFENIN KIT: Performed by: NURSE PRACTITIONER

## 2021-02-03 PROCEDURE — 76705 ECHO EXAM OF ABDOMEN: CPT

## 2021-02-03 PROCEDURE — A9537 TC99M MEBROFENIN: HCPCS | Performed by: NURSE PRACTITIONER

## 2021-02-03 PROCEDURE — 78227 HEPATOBIL SYST IMAGE W/DRUG: CPT

## 2021-02-03 RX ORDER — KIT FOR THE PREPARATION OF TECHNETIUM TC 99M MEBROFENIN 45 MG/10ML
1 INJECTION, POWDER, LYOPHILIZED, FOR SOLUTION INTRAVENOUS
Status: COMPLETED | OUTPATIENT
Start: 2021-02-03 | End: 2021-02-03

## 2021-02-03 RX ADMIN — MEBROFENIN 1 DOSE: 45 INJECTION, POWDER, LYOPHILIZED, FOR SOLUTION INTRAVENOUS at 10:50

## 2021-02-12 ENCOUNTER — OFFICE VISIT (OUTPATIENT)
Dept: BARIATRICS/WEIGHT MGMT | Facility: CLINIC | Age: 26
End: 2021-02-12

## 2021-02-12 VITALS
OXYGEN SATURATION: 98 % | HEART RATE: 102 BPM | WEIGHT: 293 LBS | SYSTOLIC BLOOD PRESSURE: 152 MMHG | TEMPERATURE: 98 F | RESPIRATION RATE: 18 BRPM | BODY MASS INDEX: 55.32 KG/M2 | HEIGHT: 61 IN | DIASTOLIC BLOOD PRESSURE: 93 MMHG

## 2021-02-12 DIAGNOSIS — R11.0 NAUSEA: Primary | ICD-10-CM

## 2021-02-12 PROCEDURE — 99214 OFFICE O/P EST MOD 30 MIN: CPT | Performed by: NURSE PRACTITIONER

## 2021-02-12 NOTE — PROGRESS NOTES
[facility]  [address] [place] [address]  [address] [place] [address]  Dept: [phone]  2/12/2021      Reema Schumacher.  [id number]  [id number]  1995  female        Chief Complaint   Patient presents with   • Consult     SWL #1       The patient is here for month 1 of their pre-operative physician supervised diet. She has a current weight of 310 pounds The patient states that she is following the recommendations given by our office and dietician including a high lean protein, low carb and low fat diet. We recommended adequate fruits and vegetable intake along with limited portion sizes. Patient is working on eliminating fast foods, fried foods, sweets and soda. Reema Schumacher has been increasing her daily water intake. She has been exercising: none    Patient states they have made positive changes including limiting fast food,   The patient admits to be struggling with exercise    Breakfast: fruit or english muffin , some days doesn't eat breakfast  Lunch: varies: leftovers from night before, P B and jelly, can of soup or turkey sandwich  Dinner: cooks at home, no fast food, meat and veggies   Snacks: none usually  Drinks: no carbonation, no coffee, water, v8 energy drinks  Exercise: none usually , has foot pain chronic which limits exercise      egd - June 2020 - needs another EGD prior to surgery   HIDA scan, [place] at the time of surgery? Will obtain HIDA scan results        Review of Systems   Constitutional: Positive for activity change and fatigue.   Respiratory: Negative.    Cardiovascular: Negative.    Gastrointestinal: Negative.    Musculoskeletal: Positive for back pain and myalgias.     Vitals:    02/12/21 1344   BP: 152/93   Pulse: 102   Resp: 18   Temp: 98 °F (36.7 °C)   SpO2: 98%     Patient Active Problem List   Diagnosis   • Abdominal pain   • Acute bronchitis   • ADHD   • Breast cyst, right   •  Gastroesophageal reflux disease   • Hypertension   • Lumbar strain   • Maxillary sinusitis   • Mixed anxiety depressive disorder   • Obesity   • Plantar wart   • Pregnancy   • Shoulder pain, right     Body mass index is 59.7 kg/m².    The following portions of the patient's history were reviewed and updated as appropriate: active problem list, medication list, allergies, social history    Physical Exam  Constitutional:       Appearance: Normal appearance. She is obese.   Cardiovascular:      Rate and Rhythm: Normal rate and regular rhythm.   Pulmonary:      Effort: Pulmonary effort is normal.      Breath sounds: Normal breath sounds.   Abdominal:      General: Abdomen is flat. Bowel sounds are normal.      Palpations: Abdomen is soft.   Skin:     General: Skin is warm and dry.   Neurological:      General: No focal deficit present.      Mental Status: She is alert and oriented to person, place, and time.   Psychiatric:         Mood and Affect: Mood normal.         Behavior: Behavior normal.         Thought Content: Thought content normal.         Judgment: Judgment normal.         Discussion/Plan:    Eating / drinking with 1 meal a day  No nicotine vaping, no smoking marijuana   Increase protein to 60-80 grams/day  Exercise 10 minutes 3 days a week (walking)       Obesity/Morbid Obesity: Currently the patient's weight is 310 pounds There are no medications prescribed.Treatment plan includes prescribed diet, prescribed exercise regimen and behavior modification.    I reviewed the appropriate dietary choices with the patient and encouraged the necessary changes. Recommended at least 70 grams of protein per day, around 35 grams of fats and less than 100 grams of carbohydrates. Reviewed calorie intake if patient wanted to calorie count and/or had BMR. Instructed patient to drink half of body weight in ounces per day and exercise a minimum of 150 minutes per week including both cardio and strength training. Discussed the  option of keeping a food journal which will help patient become more aware of the nutritional value of foods so they are more prepared after surgery.    The patient was given written materials from our office for education.   I answered all of the patients questions regarding dietary changes, exercise or surgical options.  The patient will follow up in 1 month. The total time spent face to face was 30 minutes with 20 minutes spent counseling.    HALEY Montoya  [facility]

## 2021-02-17 ENCOUNTER — HOSPITAL ENCOUNTER (OUTPATIENT)
Facility: HOSPITAL | Age: 26
Setting detail: HOSPITAL OUTPATIENT SURGERY
End: 2021-02-17
Attending: INTERNAL MEDICINE | Admitting: INTERNAL MEDICINE

## 2021-03-04 ENCOUNTER — OFFICE VISIT (OUTPATIENT)
Dept: PODIATRY | Facility: CLINIC | Age: 26
End: 2021-03-04

## 2021-03-04 VITALS
BODY MASS INDEX: 55.32 KG/M2 | HEART RATE: 90 BPM | HEIGHT: 61 IN | WEIGHT: 293 LBS | SYSTOLIC BLOOD PRESSURE: 138 MMHG | DIASTOLIC BLOOD PRESSURE: 88 MMHG

## 2021-03-04 DIAGNOSIS — M72.2 PLANTAR FASCIITIS, LEFT: ICD-10-CM

## 2021-03-04 DIAGNOSIS — G89.29 CHRONIC PAIN OF LEFT HEEL: Primary | ICD-10-CM

## 2021-03-04 DIAGNOSIS — M79.672 CHRONIC PAIN OF LEFT HEEL: Primary | ICD-10-CM

## 2021-03-04 PROCEDURE — 99203 OFFICE O/P NEW LOW 30 MIN: CPT | Performed by: PODIATRIST

## 2021-03-04 PROCEDURE — 20550 NJX 1 TENDON SHEATH/LIGAMENT: CPT | Performed by: PODIATRIST

## 2021-03-04 RX ORDER — TRIAMCINOLONE ACETONIDE 40 MG/ML
40 INJECTION, SUSPENSION INTRA-ARTICULAR; INTRAMUSCULAR ONCE
Status: COMPLETED | OUTPATIENT
Start: 2021-03-04 | End: 2021-03-04

## 2021-03-04 RX ADMIN — TRIAMCINOLONE ACETONIDE 40 MG: 40 INJECTION, SUSPENSION INTRA-ARTICULAR; INTRAMUSCULAR at 13:31

## 2021-03-04 NOTE — PROGRESS NOTES
2021  Foot and Ankle Surgery - New Patient   Provider: Dr. Juan Miguel Garnett DPM  Location: [facility]    Subjective:  Reema Schumacher is a 25 y.o. female.     Chief Complaint   Patient presents with   • Left Foot - Pain       HPI: Patient is a 25-year-old female that presents with longstanding pain involving her left heel.  She states that these issues have been present for months which have gradually progressed.  She localizes majority the pain to the plantar lateral aspect of the heel.  Symptoms are worse with first few steps in the morning and after long periods of activity.  She has tried some stretching exercises without any significant relief.  She rates the pain an 8 out of 10 with increased activity.  She is unaware of any previous injury.  She denies any substantial pain to the right heel.  She has discussed this issue with her primary care physician who has referred her to me for further management.    No Known Allergies    Past Medical History:   Diagnosis Date   • ADHD    • Depression with anxiety    • Family history of diabetes mellitus (DM)    • Family history of heart disease    • GERD (gastroesophageal reflux disease)    • Hypertension    • Obesity    • Plantar wart of right foot        Past Surgical History:   Procedure Laterality Date   •  SECTION  2016   • ENDOSCOPY N/A 2020    Procedure: ESOPHAGOGASTRODUODENOSCOPY WITH BIOPSY X2 AREAS;  Surgeon: Kenney Hodgson MD;  Location: [facility] ENDOSCOPY;  Service: Gastroenterology;  Laterality: N/A;  duodenitis, duodenal ulcers, gastric ulcers   • PLANTAR'S WART EXCISION Bilateral    • WISDOM TOOTH EXTRACTION         Family History   Problem Relation Age of Onset   • Hypertension Mother    • Obesity Mother    • Heart disease Mother    • Crohn's disease Mother    • Heart attack Father    • Stroke Father    • Diabetes Father        Social History     Socioeconomic History   • Marital status: Single     Spouse name: Not on file  "  • Number of children: Not on file   • Years of education: Not on file   • Highest education level: Not on file   Tobacco Use   • Smoking status: Former Smoker     Quit date: 2018     Years since quitting: 3.1   • Smokeless tobacco: Never Used   • Tobacco comment: Using nicotine free vape daily   Substance and Sexual Activity   • Alcohol use: Not Currently     Comment: occ   • Drug use: No   • Sexual activity: Defer        Current Outpatient Medications on File Prior to Visit   Medication Sig Dispense Refill   • pantoprazole (PROTONIX) 20 MG EC tablet Take 20 mg by mouth 2 (Two) Times a Day.     • venlafaxine XR (EFFEXOR-XR) 75 MG 24 hr capsule Take 1 capsule by mouth Daily. 90 capsule 1     No current facility-administered medications on file prior to visit.        Review of Systems:  General: Denies fever, chills, fatigue, and weakness.  Eyes: Denies vision loss, blurry vision, and excessive redness.  ENT: Denies hearing issues and difficulty swallowing.  Cardiovascular: Denies palpitations, chest pain, or syncopal episodes.  Respiratory: Denies shortness of breath, wheezing, and coughing.  GI: Denies abdominal pain, nausea, and vomiting.   : Denies frequency, hematuria, and urgency.  Musculoskeletal:+ Left heel pain  Derm: Denies rash, open wounds, or suspicious lesions.  Neuro: Denies headaches, numbness, loss of coordination, and tremors.  Psych: Denies anxiety and depression.  Endocrine: Denies temperature intolerance and changes in appetite.  Heme: Denies bleeding disorders or abnormal bruising.     Objective   /88   Pulse 90   Ht 153.7 cm (60.5\")   Wt (!) 140 kg (308 lb)   BMI 59.16 kg/m²     Foot/Ankle Exam:       General:   Appearance: appears stated age and healthy and obesity    Orientation: AAOx3    Affect: appropriate    Gait: antalgic      VASCULAR      Left Foot Vascularity   Normal vascular exam    Dorsalis pedis:  2+  Posterior tibial:  2+  Skin Temperature: warm    Edema Grading:  " None  CFT:  < 3 seconds  Pedal Hair Growth:  Present  Varicosities: none        NEUROLOGIC     Left Foot Neurologic   Light touch sensation:  Normal  Hot/cold sensation: normal    Achilles reflex:  2+     MUSCULOSKELETAL      Left Foot Musculoskeletal   Ecchymosis:  None  Tenderness: plantar fascia and plantar heel    Arch:  Normal     MUSCLE STRENGTH     Left Foot Muscle Strength   Normal strength    Foot dorsiflexion:  5  Foot plantar flexion:  5  Foot inversion:  5  Foot eversion:  5     DERMATOLOGIC     Left Foot Dermatologic   Skin: skin intact       TESTS     Left Foot Tests   Calcaneal squeeze: negative        Left Foot Additional Comments: Moderate discomfort with palpation to the plantar lateral aspect of the heel.  No gross deformity or instability.  Moderate equinus contracture and soft tissue rigidity      Assessment/Plan   Diagnoses and all orders for this visit:    1. Chronic pain of left heel (Primary)  -     XR Foot 3+ View Left  -     triamcinolone acetonide (KENALOG-40) injection 40 mg    2. Plantar fasciitis, left      Patient is a 25-year-old obese female that presents with pain involving the plantar lateral aspect of the left heel for several months.  Her symptoms are worse with increased activity.  I explained that her issues are secondary to plantar fasciitis.  We did discuss the diagnosis and treatment options.  Given her level of inflammation, I have suggested that we proceed with a steroid injection.  We did discuss risks and benefits.  Procedure was performed under ultrasound guidance without complication.  I have recommended that she acquire a pair of over-the-counter arch supports with metatarsal pad.  We did review appropriate shoes and to avoid barefoot and unsupported weightbearing.  I would like her to start manual therapy and stretching exercises on a daily basis.  We did discuss proper use of oral anti-inflammatories.  I would like to see her in 4 weeks for reevaluation.    Plantar  Fascia Steroid Injection: Left    Informed consent was obtained before proceeding with the procedure. The area of maximal tenderness was palpated near the plantar medial calcaneal tuberosity of left foot.  The area was cleansed with alcohol.  Under ultrasound guidance, the plantar fascia was visualized and a solution totaling 1.5 mL was injected about the origin of the plantar fascia.  The solution contained 0.5 mL of 1% lidocaine plain, 0.5 mL of 0.5% Marcaine plain, and 0.5 mL of Kenalog.  After the injection, the patient noted immediate pain relief.  Mild compression was placed at the injection site followed by a sterile bandage.  The patient tolerated the injection well without complication.      Orders Placed This Encounter   Procedures   • XR Foot 3+ View Left     Order Specific Question:   Reason for Exam:     Answer:   RM 10 WB Left Heel Pain     Order Specific Question:   Patient Pregnant     Answer:   No     Order Specific Question:   Does this patient have a diabetic monitoring/medication delivering device on?     Answer:   No        Note is dictated utilizing voice recognition software. Unfortunately this leads to occasional typographical errors. I apologize in advance if the situation occurs. If questions occur please do not hesitate to call our office.

## 2021-03-11 ENCOUNTER — OFFICE VISIT (OUTPATIENT)
Dept: BARIATRICS/WEIGHT MGMT | Facility: CLINIC | Age: 26
End: 2021-03-11

## 2021-03-11 DIAGNOSIS — Z71.3 NUTRITIONAL COUNSELING: ICD-10-CM

## 2021-03-11 PROCEDURE — 99443 PR PHYS/QHP TELEPHONE EVALUATION 21-30 MIN: CPT | Performed by: NURSE PRACTITIONER

## 2021-03-11 NOTE — PROGRESS NOTES
[facility] BARIATRIC SURGERY  [address] [place] [address]  [address] [place] [address]  Dept: [phone]  3/11/2021      Reema Schumacher.  [id number]  [id number]  1995  female     You have chosen to receive care through a telephone call. Do you consent to use a telephone call for your medical care today? Yes    SWL # 2    Current weight: 310.6 pounds     You have chosen to receive care through a telephone call. DO you consent to use a telephone call for your medical care today? Yes    The patient is here for month 2 of their pre-operative physician supervised diet. She had a loss of 0 lbs. The patient states that she is following the recommendations given by our office and dietician including a high lean protein, low carb and low fat diet. We recommended adequate fruits and vegetable intake along with limited portion sizes. Patient is working on eliminating fast foods, fried foods, sweets and soda. Reema Schumacher has been increasing her daily water intake. She has been exercising: walking more.    Patient states they have made positive changes including increased protein   The patient admits to be struggling with soda, fast food due to increased work schedule     Breakfast: protein shakes or protein smoothies   Lunch: sandwich or turkey wrap with carrots and ranch or fruit   Dinner: hello fresh meal kits ( meat and potatoe or veggie)   Snacks: none usually   Drinks: no carbonation, water and un sweet iced coffee   Exercise: limited due to plantar fasciitis, walking at work     Past goals:  Eating / drinking with 1 meal a day- not met   No nicotine vaping, no smoking marijuana - not met  Increase protein to 60-80 grams/day- met   Exercise 10 minutes 3 days a week (walking) - partially met     Review of Systems   Constitutional: Positive for activity change.   Respiratory: Negative.    Cardiovascular: Negative.    Gastrointestinal:  Negative.    Musculoskeletal: Positive for back pain.     Height 60.5 inches, weight 310.6 pounds. BMI 59.66  Patient Active Problem List   Diagnosis   • Abdominal pain   • Acute bronchitis   • ADHD   • Breast cyst, right   • Gastroesophageal reflux disease   • Hypertension   • Lumbar strain   • Maxillary sinusitis   • Mixed anxiety depressive disorder   • Obesity   • Plantar wart   • Pregnancy   • Shoulder pain, right       The following portions of the patient's history were reviewed and updated as appropriate: active problem list, medication list, allergies, social history, notes from last encounter    Physical Exam  Cardiovascular:      Rate and Rhythm: Normal rate and regular rhythm.      Comments: Per patient  Pulmonary:      Effort: Pulmonary effort is normal.      Breath sounds: Normal breath sounds.      Comments: Per patient   Abdominal:      General: Abdomen is flat. Bowel sounds are normal.      Palpations: Abdomen is soft.      Comments: Per patient    Neurological:      General: No focal deficit present.      Mental Status: She is alert and oriented to person, place, and time.   Psychiatric:         Mood and Affect: Mood normal.         Behavior: Behavior normal.         Thought Content: Thought content normal.         Judgment: Judgment normal.         Discussion/Plan:  New goals:  Not giving into cravings ( soda and fast food)  Eat and drink separately with 1 meal a day  Exercise 10 minutes 2-3 times a weeks      Obesity/Morbid Obesity: Currently the patient's weight is stable. There are no medications prescribed.Treatment plan includes prescribed diet, prescribed exercise regimen and behavior modification.    I reviewed the appropriate dietary choices with the patient and encouraged the necessary changes. Recommended at least 70 grams of protein per day, around 35 grams of fats and less than 100 grams of carbohydrates. Reviewed calorie intake if patient wanted to calorie count and/or had BMR.  Instructed patient to drink half of body weight in ounces per day and exercise a minimum of 150 minutes per week including both cardio and strength training. Discussed the option of keeping a food journal which will help patient become more aware of the nutritional value of foods so they are more prepared after surgery.    The patient was given written materials from our office for education.   I answered all of the patients questions regarding dietary changes, exercise or surgical options.  The patient will follow up in 1 month. The total time spent face to face was 30 minutes with 25 minutes spent counseling.    HALEY Montoya  [facility] Bariatrics and General Surgery

## 2021-03-12 VITALS — BODY MASS INDEX: 55.32 KG/M2 | HEIGHT: 61 IN | WEIGHT: 293 LBS

## 2021-04-08 ENCOUNTER — TELEMEDICINE (OUTPATIENT)
Dept: FAMILY MEDICINE CLINIC | Facility: TELEHEALTH | Age: 26
End: 2021-04-08

## 2021-04-08 DIAGNOSIS — Z76.89 RETURN TO WORK EVALUATION: Primary | ICD-10-CM

## 2021-04-08 PROCEDURE — 99212 OFFICE O/P EST SF 10 MIN: CPT | Performed by: NURSE PRACTITIONER

## 2021-04-08 NOTE — PROGRESS NOTES
Supa Schumacher is a 25 y.o. female.     She has had some congestion for about a week. The congested has moved down to her chest and is now resolving. Her work requests a note to return to work. She is a  for a doctor's office. She works by herself. Her work requires that she be symptom free before returning to work. She received her first covid-19 vaccine on week ago today. She did a covid-19 test at an outside facility but has not received results yet. Denies loss of taste/smell. She did have a fever for a couple of days, but has resolved (tmax 101).       The following portions of the patient's history were reviewed and updated as appropriate: allergies, current medications, past family history, past medical history, past social history, past surgical history and problem list.    Review of Systems   Constitutional: Negative for fever (now resolved).   HENT: Positive for congestion.    Respiratory: Positive for cough and chest tightness. Negative for shortness of breath and wheezing.        Objective   Physical Exam  Constitutional:       General: She is not in acute distress.     Appearance: She is well-developed. She is not diaphoretic.   Pulmonary:      Effort: Pulmonary effort is normal.   Neurological:      Mental Status: She is alert and oriented to person, place, and time.   Psychiatric:         Behavior: Behavior normal.           Assessment/Plan   Diagnoses and all orders for this visit:    1. Return to work evaluation (Primary)       May return to work following the completion of the CDC recommended quarantine of 10 days from symptom onset. Work note provided. If her symptoms change or worsen, she is to send us a [employer] message for the work excuse to be extended.    I spent 10 minutes in the patient's chart for this video visit.

## 2021-04-20 VITALS — WEIGHT: 293 LBS | HEIGHT: 61 IN | BODY MASS INDEX: 55.32 KG/M2

## 2021-04-20 RX ORDER — LANOLIN ALCOHOL/MO/W.PET/CERES
1000 CREAM (GRAM) TOPICAL DAILY
COMMUNITY
End: 2021-05-04

## 2021-04-20 RX ORDER — OMEGA-3S/DHA/EPA/FISH OIL/D3 300MG-1000
400 CAPSULE ORAL DAILY
COMMUNITY
End: 2021-05-04

## 2021-04-23 ENCOUNTER — ANESTHESIA EVENT (OUTPATIENT)
Dept: GASTROENTEROLOGY | Facility: HOSPITAL | Age: 26
End: 2021-04-23

## 2021-04-23 RX ORDER — SODIUM CHLORIDE 0.9 % (FLUSH) 0.9 %
10 SYRINGE (ML) INJECTION EVERY 12 HOURS SCHEDULED
Status: CANCELLED | OUTPATIENT
Start: 2021-04-23

## 2021-04-23 RX ORDER — SODIUM CHLORIDE, SODIUM LACTATE, POTASSIUM CHLORIDE, CALCIUM CHLORIDE 600; 310; 30; 20 MG/100ML; MG/100ML; MG/100ML; MG/100ML
9 INJECTION, SOLUTION INTRAVENOUS CONTINUOUS PRN
Status: CANCELLED | OUTPATIENT
Start: 2021-04-23

## 2021-04-23 RX ORDER — LIDOCAINE HYDROCHLORIDE 10 MG/ML
0.5 INJECTION, SOLUTION EPIDURAL; INFILTRATION; INTRACAUDAL; PERINEURAL ONCE AS NEEDED
Status: CANCELLED | OUTPATIENT
Start: 2021-04-23

## 2021-04-23 RX ORDER — SODIUM CHLORIDE 9 MG/ML
9 INJECTION, SOLUTION INTRAVENOUS CONTINUOUS PRN
Status: CANCELLED | OUTPATIENT
Start: 2021-04-23

## 2021-04-23 RX ORDER — SODIUM CHLORIDE 0.9 % (FLUSH) 0.9 %
10 SYRINGE (ML) INJECTION AS NEEDED
Status: CANCELLED | OUTPATIENT
Start: 2021-04-23

## 2021-04-26 ENCOUNTER — ANESTHESIA (OUTPATIENT)
Dept: GASTROENTEROLOGY | Facility: HOSPITAL | Age: 26
End: 2021-04-26

## 2021-04-26 NOTE — ANESTHESIA PREPROCEDURE EVALUATION
Anesthesia Evaluation     Patient summary reviewed   NPO Solid Status: > 8 hours  NPO Liquid Status: > 8 hours           Airway   Mallampati: II  TM distance: >3 FB  Neck ROM: full  No difficulty expected  Dental - normal exam     Pulmonary - normal exam   Cardiovascular - normal exam  Exercise tolerance: good (4-7 METS)    (+) hypertension,       Neuro/Psych  GI/Hepatic/Renal/Endo    (+) morbid obesity, GERD,      Musculoskeletal     Abdominal  - normal exam    Bowel sounds: normal.   Substance History      OB/GYN          Other                        Anesthesia Plan    ASA 3     MAC     intravenous induction     Anesthetic plan, all risks, benefits, and alternatives have been provided, discussed and informed consent has been obtained with: patient.

## 2021-05-04 ENCOUNTER — CONSULT (OUTPATIENT)
Dept: BARIATRICS/WEIGHT MGMT | Facility: CLINIC | Age: 26
End: 2021-05-04

## 2021-05-04 ENCOUNTER — OFFICE VISIT (OUTPATIENT)
Dept: PSYCHIATRY | Facility: CLINIC | Age: 26
End: 2021-05-04

## 2021-05-04 VITALS
RESPIRATION RATE: 18 BRPM | WEIGHT: 293 LBS | SYSTOLIC BLOOD PRESSURE: 157 MMHG | DIASTOLIC BLOOD PRESSURE: 96 MMHG | OXYGEN SATURATION: 97 % | HEART RATE: 86 BPM | HEIGHT: 61 IN | BODY MASS INDEX: 55.32 KG/M2 | TEMPERATURE: 96.9 F

## 2021-05-04 DIAGNOSIS — Z01.818 PRE-OP EXAMINATION: ICD-10-CM

## 2021-05-04 DIAGNOSIS — E66.01 MORBID OBESITY (HCC): Primary | Chronic | ICD-10-CM

## 2021-05-04 DIAGNOSIS — Z01.818 PRE-OPERATIVE EXAMINATION: Chronic | ICD-10-CM

## 2021-05-04 PROCEDURE — 90791 PSYCH DIAGNOSTIC EVALUATION: CPT | Performed by: PSYCHIATRY & NEUROLOGY

## 2021-05-04 PROCEDURE — 99215 OFFICE O/P EST HI 40 MIN: CPT | Performed by: NURSE PRACTITIONER

## 2021-05-04 NOTE — PATIENT INSTRUCTIONS
"BMI for Adults  What is BMI?  Body mass index (BMI) is a number that is calculated from a person's weight and height. BMI can help estimate how much of a person's weight is composed of fat. BMI does not measure body fat directly. Rather, it is an alternative to procedures that directly measure body fat, which can be difficult and expensive.  BMI can help identify people who may be at higher risk for certain medical problems.  What are BMI measurements used for?  BMI is used as a screening tool to identify possible weight problems. It helps determine whether a person is obese, overweight, a healthy weight, or underweight.  BMI is useful for:  · Identifying a weight problem that may be related to a medical condition or may increase the risk for medical problems.  · Promoting changes, such as changes in diet and exercise, to help reach a healthy weight. BMI screening can be repeated to see if these changes are working.  How is BMI calculated?  BMI involves measuring your weight in relation to your height. Both height and weight are measured, and the BMI is calculated from those numbers. This can be done either in English ([place] or metric measurements. Note that charts and online BMI calculators are available to help you find your BMI quickly and easily without having to do these calculations yourself.  To calculate your BMI in English ([place] measurements:    1. Measure your weight in pounds (lb).  2. Multiply the number of pounds by 703.  ? For example, for a person who weighs 180 lb, multiply that number by 703, which equals 126,540.  3. Measure your height in inches. Then multiply that number by itself to get a measurement called \"inches squared.\"  ? For example, for a person who is 70 inches tall, the \"inches squared\" measurement is 70 inches x 70 inches, which equals 4,900 inches squared.  4. Divide the total from step 2 (number of lb x 703) by the total from step 3 (inches squared): 126,540 ÷ 4,900 = 25.8. This is " "your BMI.  To calculate your BMI in metric measurements:  1. Measure your weight in kilograms (kg).  2. Measure your height in meters (m). Then multiply that number by itself to get a measurement called \"meters squared.\"  ? For example, for a person who is 1.75 m tall, the \"meters squared\" measurement is 1.75 m x 1.75 m, which is equal to 3.1 meters squared.  3. Divide the number of kilograms (your weight) by the meters squared number. In this example: 70 ÷ 3.1 = 22.6. This is your BMI.  What do the results mean?  BMI charts are used to identify whether you are underweight, normal weight, overweight, or obese. The following guidelines will be used:  · Underweight: BMI less than 18.5.  · Normal weight: BMI between 18.5 and 24.9.  · Overweight: BMI between 25 and 29.9.  · Obese: BMI of 30 or above.  Keep these notes in mind:  · Weight includes both fat and muscle, so someone with a muscular build, such as an athlete, may have a BMI that is higher than 24.9. In cases like these, BMI is not an accurate measure of body fat.  · To determine if excess body fat is the cause of a BMI of 25 or higher, further assessments may need to be done by a health care provider.  · BMI is usually interpreted in the same way for men and women.  Where to find more information  For more information about BMI, including tools to quickly calculate your BMI, go to these websites:  · Centers for Disease Control and Prevention: www.cdc.gov  · American Heart Association: www.heart.org  · National Heart, Lung, and Blood [place]: www.nhlbi.nih.gov  Summary  · Body mass index (BMI) is a number that is calculated from a person's weight and height.  · BMI may help estimate how much of a person's weight is composed of fat. BMI can help identify those who may be at higher risk for certain medical problems.  · BMI can be measured using English measurements or metric measurements.  · BMI charts are used to identify whether you are underweight, normal " weight, overweight, or obese.  This information is not intended to replace advice given to you by your health care provider. Make sure you discuss any questions you have with your health care provider.  Document Revised: 09/09/2020 Document Reviewed: 07/17/2020  Elsevier Patient Education © 2021 [employer]
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
## 2021-05-04 NOTE — PROGRESS NOTES
Subjective   Reema Schumacher is a 25 y.o.y.o. female who presents today for psych eval for bariatric procedure     Chief Complaint:    Pre OP Evaluation     History of Present Illness:   The pt has a hx of depression, anxiety , currently on meds, mood is stable now   Anxiety is manageable     No hx of eating d/o, no binge eating       The pt suffered from excessive weight since childhood    Weight gain was related to eating habits   She was teased and laughed at in school due to weight , no flashbacks, no recollection       This pt had appropriate reasons for seeking bariatric surgery including health issues   The pt also hopes to increase activity level with significant weight loss     The pt reported multiple weight loss attempts including  weight watchers, everything from intermittent fasting to low carb diet    The most successful attempt was losing 20 lbs and all past weight loss attempts have only provided temporary relief   The pt denied difficulties perceiving weight loss in the past     Healthy eating habits include 2  meals per day,  lean protein , vegetables       Maladaptive eating habits include  occasional fast food, snacking when tired, admitted to eating as a self reward.     Currently 309  lbs,      highest weight  318   lbs      BMI  57      The pt is not sure yet about the procedure     The following portions of the patient's history were reviewed and updated as appropriate: allergies, current medications, past family history, past medical history, past social history, past surgical history and problem list.    Past Medical History:   Diagnosis Date   • ADHD    • Depression with anxiety    • Family history of diabetes mellitus (DM)    • Family history of heart disease    • GERD (gastroesophageal reflux disease)    • Hypertension    • Obesity          Social History     Socioeconomic History   • Marital status: Single     Spouse name: Not on file   • Number of children: Not on file   • Years of  education: Not on file   • Highest education level: Not on file   Tobacco Use   • Smoking status: Former Smoker     Quit date:      Years since quitting: 3.3   • Smokeless tobacco: Never Used   • Tobacco comment: Using nicotine free vape daily   Vaping Use   • Vaping Use: Some days   • Substances: CBD   Substance and Sexual Activity   • Alcohol use: Not Currently     Comment: occ   • Drug use: Yes     Types: Marijuana     Comment: daily   • Sexual activity: Defer   1 child,    Engaged   No hx of abuse   Full time employed     Family History   Problem Relation Age of Onset   • Hypertension Mother    • Obesity Mother    • Heart disease Mother    • Crohn's disease Mother    • Heart attack Father    • Stroke Father    • Diabetes Father        Past Surgical History:   Procedure Laterality Date   •  SECTION     • ENDOSCOPY N/A 2020    Procedure: ESOPHAGOGASTRODUODENOSCOPY WITH BIOPSY X2 AREAS;  Surgeon: Kenney Hodgson MD;  [facility] ENDOSCOPY;  Service: Gastroenterology;  Laterality: N/A;  duodenitis, duodenal ulcers, gastric ulcers   • PLANTAR'S WART EXCISION Bilateral    • WISDOM TOOTH EXTRACTION         Patient Active Problem List   Diagnosis   • Abdominal pain   • Acute bronchitis   • ADHD   • Breast cyst, right   • Gastroesophageal reflux disease   • Hypertension   • Lumbar strain   • Maxillary sinusitis   • Mixed anxiety depressive disorder   • Morbid obesity (CMS/HCC)   • Plantar wart   • Pregnancy   • Shoulder pain, right   • BMI 50.0-59.9, adult (CMS/HCC)   • Pre-operative examination         No Known Allergies      Current Outpatient Medications:   •  cholecalciferol (VITAMIN D3) 10 MCG (400 UNIT) tablet, Take 400 Units by mouth Daily., Disp: , Rfl:   •  pantoprazole (PROTONIX) 20 MG EC tablet, Take 20 mg by mouth 2 (Two) Times a Day., Disp: , Rfl:   •  venlafaxine XR (EFFEXOR-XR) 75 MG 24 hr capsule, Take 1 capsule by mouth Daily., Disp: 90 capsule, Rfl: 1  •  vitamin B-12  (CYANOCOBALAMIN) 1000 MCG tablet, Take 1,000 mcg by mouth Daily., Disp: , Rfl:     PAST PSYCHIATRIC HISTORY  inpt at [place] [facility] to depression and SI, no attempts   In Sept 2019 , then April 2020     PAST OUTPATIENT TREATMENT  Diagnosis treated:  Depression anxiety   Treatment Type:  meds and therapy at [facility]   Prior Psychiatric Medications:  effexor - now  zoloft as a teen - not effective   Support Groups:  None   Sequelae Of Mental Disorder:  Emotional distress     Psychological ROS: positive for - anxiety  negative for - depression, disorientation, hallucinations, hostility, irritability, memory difficulties, mood swings, physical abuse, sexual abuse or suicidal ideation     Mental Status Exam:    Hygiene:   good  Cooperation:  Cooperative  Eye Contact:  Good  Psychomotor Behavior:  Appropriate  Affect:  Appropriate  Hopelessness: Denies  Speech:  Normal  Thought Progress:  Goal directed and Linear  Thought Content:  Normal and Mood congruent  Suicidal:  None  Homicidal:  None  Hallucinations:  None  Delusion:  None  Memory:  Intact  Orientation:  Person, Place, Time and Situation  Reliability:  good  Insight:  Good  Judgement:  Good  Impulse Control:  Fair  Physical/Medical Issues:  No         Former smoker      Diagnoses and all orders for this visit:    1. Morbid obesity (CMS/HCC) (Primary)    2. BMI 50.0-59.9, adult (CMS/HCC)    3. Pre-operative examination         Diagnosis Plan   1. Morbid obesity (CMS/HCC)     2. BMI 50.0-59.9, adult (CMS/HCC)     3. Pre-operative examination           TREATMENT PLAN/GOALS:   No contraindications for bariatric procedure     Continue supportive psychotherapy efforts and medications as indicated. Treatment and medication options discussed during today's visit. Patient ackowledged and verbally consented to continue with current treatment plan and was educated on the importance of compliance with treatment and follow-up appointments.    MEDICATION ISSUES: meds were not prescribed  during this visit     No f/u planned   PHQ-9 Depression Screening  Little interest or pleasure in doing things? 1   Feeling down, depressed, or hopeless? 1   Trouble falling or staying asleep, or sleeping too much? 1   Feeling tired or having little energy? 1   Poor appetite or overeating? 0   Feeling bad about yourself - or that you are a failure or have let yourself or your family down? 1   Trouble concentrating on things, such as reading the newspaper or watching television? 0   Moving or speaking so slowly that other people could have noticed? Or the opposite - being so fidgety or restless that you have been moving around a lot more than usual? 0   Thoughts that you would be better off dead, or of hurting yourself in some way? 0   PHQ-9 Total Score 5   If you checked off any problems, how difficult have these problems made it for you to do your work, take care of things at home, or get along with other people? Somewhat difficult            This document has been electronically signed by Radha Hinton MD  05/04/2021

## 2021-05-26 ENCOUNTER — OFFICE VISIT (OUTPATIENT)
Dept: FAMILY MEDICINE CLINIC | Facility: CLINIC | Age: 26
End: 2021-05-26

## 2021-05-26 VITALS
HEART RATE: 86 BPM | OXYGEN SATURATION: 98 % | SYSTOLIC BLOOD PRESSURE: 130 MMHG | WEIGHT: 293 LBS | BODY MASS INDEX: 58.78 KG/M2 | TEMPERATURE: 97.7 F | DIASTOLIC BLOOD PRESSURE: 77 MMHG

## 2021-05-26 DIAGNOSIS — G56.01 CARPAL TUNNEL SYNDROME OF RIGHT WRIST: Primary | ICD-10-CM

## 2021-05-26 DIAGNOSIS — F98.8 ATTENTION DEFICIT DISORDER (ADD) WITHOUT HYPERACTIVITY: ICD-10-CM

## 2021-05-26 PROCEDURE — 99214 OFFICE O/P EST MOD 30 MIN: CPT | Performed by: FAMILY MEDICINE

## 2021-05-26 NOTE — PROGRESS NOTES
Subjective   Reema Schumacher is a 25 y.o. female.     Reema Schumacher is in for a couple concerns.  She has had worsening numbness and tingling in her right hand and forearm that she thinks is related to repetitive tasks.  She has not had any specific trauma or injury, and she does not have the symptom in her left hand or arm.  She is right-hand dominant.  It does disrupt sleep at times.  She also has had a history of ADD but has not been on any recent medication.  She would like to restart as her lack of focus is really affecting her daily activities again. There is no history of chest pain or dyspnea. There is no history of issue with bowel or bladder dysfunction. There is no history of dizziness or lightheadedness. There is no history of issue with mood. There is no history of issue with present medication.            /77 (BP Location: Left arm, Patient Position: Sitting, Cuff Size: Large Adult)   Pulse 86   Temp 97.7 °F (36.5 °C) (Temporal)   Wt (!) 139 kg (306 lb)   SpO2 98%   BMI 58.78 kg/m²       Chief Complaint   Patient presents with   • Hand Pain     right hand and fingers and forearm also has trouble sleeping due to the cramping    • Numbness     in right hand and fingers   • Medication Problem     ADHD MEDICINE WHEN she was younger and wants to try to get back on it            Current Outpatient Medications:   •  venlafaxine XR (EFFEXOR-XR) 75 MG 24 hr capsule, Take 1 capsule by mouth Daily., Disp: 90 capsule, Rfl: 1  •  lisdexamfetamine (Vyvanse) 40 MG capsule, Take 1 capsule by mouth Every Morning, Disp: 30 capsule, Rfl: 0        The following portions of the patient's history were reviewed and updated as appropriate: allergies, current medications, past family history, past medical history, past social history, past surgical history, and problem list.    Review of Systems   Constitutional: Negative for activity change, fatigue and fever.   HENT: Negative for congestion, sinus pressure,  sinus pain, sore throat and trouble swallowing.    Eyes: Negative for visual disturbance.   Respiratory: Negative for chest tightness, shortness of breath and wheezing.    Cardiovascular: Negative for chest pain.   Gastrointestinal: Negative for abdominal distention, abdominal pain, constipation, diarrhea, nausea and vomiting.   Genitourinary: Negative for difficulty urinating, dysuria, frequency and urgency.   Musculoskeletal: Negative for back pain and neck pain.   Neurological: Positive for numbness. Negative for dizziness, tremors, weakness and light-headedness.   Psychiatric/Behavioral: Positive for decreased concentration. Negative for agitation, hallucinations, sleep disturbance and suicidal ideas. The patient is not nervous/anxious.        Objective   Physical Exam  Vitals and nursing note reviewed.   Constitutional:       Appearance: She is obese.   Cardiovascular:      Rate and Rhythm: Normal rate and regular rhythm.      Heart sounds: No gallop.    Pulmonary:      Effort: Pulmonary effort is normal.      Breath sounds: No wheezing or rales.   Abdominal:      General: Bowel sounds are normal.      Palpations: Abdomen is soft.      Tenderness: There is no abdominal tenderness. There is no guarding.   Musculoskeletal:      Cervical back: Neck supple.      Left lower leg: No edema.   Lymphadenopathy:      Cervical: No cervical adenopathy.   Skin:     Findings: No rash.   Neurological:      General: No focal deficit present.      Mental Status: She is alert and oriented to person, place, and time.      Sensory: Sensory deficit present.   Psychiatric:         Mood and Affect: Mood normal.           Assessment/Plan   Problems Addressed this Visit     None      Visit Diagnoses     Carpal tunnel syndrome of right wrist    -  Primary    Relevant Orders    EMG 2 Limbs    Attention deficit disorder (ADD) without hyperactivity        Relevant Medications    lisdexamfetamine (Vyvanse) 40 MG capsule      Diagnoses        Codes Comments    Carpal tunnel syndrome of right wrist    -  Primary ICD-10-CM: G56.01  ICD-9-CM: 354.0     Attention deficit disorder (ADD) without hyperactivity     ICD-10-CM: F98.8  ICD-9-CM: 314.00         I will start her on some Vyvanse at 40 mg for the attention deficit issue  I will refer her for an EMG to test the possibility of carpal tunnel  If the EMG is positive I will refer her to a local hand surgeon  She is to follow-up with me in about 6 weeks  She has completed Covid vaccine series  She is to call for new concerns           Answers for HPI/ROS submitted by the patient on 5/24/2021  Please describe your symptoms.: Pain in right hand and arm, symptoms match that of carpal tunnel. -medicine change  Have you had these symptoms before?: Yes  How long have you been having these symptoms?: 5-7 days  What is the primary reason for your visit?: Other

## 2021-06-10 ENCOUNTER — TELEMEDICINE (OUTPATIENT)
Dept: BARIATRICS/WEIGHT MGMT | Facility: CLINIC | Age: 26
End: 2021-06-10

## 2021-06-10 DIAGNOSIS — F12.10 MILD TETRAHYDROCANNABINOL (THC) ABUSE: ICD-10-CM

## 2021-06-10 PROCEDURE — 99214 OFFICE O/P EST MOD 30 MIN: CPT | Performed by: NURSE PRACTITIONER

## 2021-06-10 NOTE — PROGRESS NOTES
[facility] BARIATRIC SURGERY  [address] [place] [address]  [address] [place] [address]  Dept: [phone]  6/10/2021      Reema Schumacher.  [id number]  [id number]  1995  female    You have chosen to receive care through a video visit. Do you consent to use a video visit for your medical care today? Yes    Current weight 307 pounds     The patient is here for month 5 of their pre-operative physician supervised diet. She had a gain of 2 lbs. The patient states that she is following the recommendations given by our office and dietician including a high lean protein, low carb and low fat diet. We recommended adequate fruits and vegetable intake along with limited portion sizes. Patient is working on eliminating fast foods, fried foods, sweets and soda. Reema Schumacher has been increasing her daily water intake. She has been exercising: walking.    Patient states they have made positive changes including increased protein  The patient admits to be struggling with soda craving, sweets     Breakfast: protein shakes - atkins coffee  Lunch: sandwich/ soups  Dinner: living back with mom- meat and veggie - varies   Snacks: fruit and smoothies, trail mix   Drinks: water, no carbonation, dairy, tea - hot   Exercise: [place] - [place], more physical labor at work     Past goals:  Eating and drinking separately with 1 meal a day- partially met   Getting out unhealthy snacks from house- met  Eating at home all days except for 1 day a week - met   No Marijuana - will retest prior to surgery         Review of Systems   Constitutional: Positive for fatigue.   Respiratory: Negative.    Cardiovascular: Negative.    Gastrointestinal: Negative.    Musculoskeletal: Positive for back pain.     Height 60.5 inches, weight 307 pounds, 58.97   Patient Active Problem List   Diagnosis   • Abdominal pain   • Acute bronchitis   • ADHD   • Breast  cyst, right   • Gastroesophageal reflux disease   • Hypertension   • Lumbar strain   • Maxillary sinusitis   • Mixed anxiety depressive disorder   • Morbid obesity (CMS/HCC)   • Plantar wart   • Pregnancy   • Shoulder pain, right   • BMI 50.0-59.9, adult (CMS/HCC)   • Pre-operative examination       The following portions of the patient's history were reviewed and updated as appropriate: active problem list, medication list, allergies, social history, notes from last encounter    Physical Exam  Constitutional:       Appearance: Normal appearance. She is obese.   Cardiovascular:      Comments: Unable to assess due to video visit  Pulmonary:      Comments: Unable to assess due to video visit  Abdominal:      Comments: Unable to assess due to video visit   Neurological:      General: No focal deficit present.      Mental Status: She is alert and oriented to person, place, and time.   Psychiatric:         Mood and Affect: Mood normal.         Behavior: Behavior normal.         Thought Content: Thought content normal.         Judgment: Judgment normal.         Discussion/Plan:    New goals:  Exercise 20-30 minutes 2-3 times a week outside of work   Eating and drinking separately with 1 meal a day    Obesity/Morbid Obesity: Currently the patient's weight is increased. There are no medications prescribed.Treatment plan includes prescribed diet, prescribed exercise regimen and behavior modification.    I reviewed the appropriate dietary choices with the patient and encouraged the necessary changes. Recommended at least 70 grams of protein per day, around 35 grams of fats and less than 100 grams of carbohydrates. Reviewed calorie intake if patient wanted to calorie count and/or had BMR. Instructed patient to drink half of body weight in ounces per day and exercise a minimum of 150 minutes per week including both cardio and strength training. Discussed the option of keeping a food journal which will help patient become more  aware of the nutritional value of foods so they are more prepared after surgery.    The patient was given written materials from our office for education.   I answered all of the patients questions regarding dietary changes, exercise or surgical options.  The patient will follow up in 1 month. The total time spent face to face was 30 minutes with 25 minutes spent counseling.    Pt will need urine nicotine and drug screen retested prior to surgery.    HALEY Montoya  [facility] Bariatrics and General Surgery

## 2021-06-16 NOTE — PROGRESS NOTES
Subjective  Answers for HPI/ROS submitted by the patient on 6/15/2021  Please describe your symptoms.: Ongoing issue (about 4 years) with sebacious cysts under both breasts  Have you had these symptoms before?: Yes  How long have you been having these symptoms?: Greater than 2 weeks  Please list any medications you are currently taking for this condition.: Drawing salve during flare up, once open, antibacterial ointment and bandaged  What is the primary reason for your visit?: Olinda Schumacher is a 25 y.o. female.     Patient is here today with complaints of skin lesions-nodules under bilateral breasts.  She reports that this is an ongoing chronic issue.  She has had previous sebaceous since diagnosed 5 yrs ago that come and go.  She has had them lanced multiple times in the ER.  Last one was in March 2020.  She was referred to general surgery but never saw them.  She states that she had drainage and inflammation a month ago but they have improved.   She is having bariatric surgery in July and she will also have a hiatal hernia repair and cholecystectomy.  She has tried applying powders under her breast bc she has redness and chaffing.   She works in the heat and sweats frequently.    Denies fever or chills.  Denies CP, SOA, dizziness, HA.          The following portions of the patient's history were reviewed and updated as appropriate: allergies, current medications, past family history, past medical history, past social history, past surgical history and problem list.    Review of Systems   Constitutional: Negative for chills, fatigue and fever.   Respiratory: Negative for chest tightness and shortness of breath.    Cardiovascular: Negative for chest pain and palpitations.   Skin: Positive for rash (under prashant breast).   Neurological: Negative for dizziness and headache.       Objective   /83 (BP Location: Left arm, Patient Position: Sitting, Cuff Size: Large Adult)   Pulse 92   Temp 97.8 °F  (36.6 °C) (Tympanic)   Wt (!) 138 kg (305 lb)   SpO2 98%   BMI 58.59 kg/m²   Physical Exam  Constitutional:       Appearance: Normal appearance. She is obese.   HENT:      Head: Normocephalic and atraumatic.   Pulmonary:      Effort: Pulmonary effort is normal. No respiratory distress.   Musculoskeletal:         General: Normal range of motion.   Skin:     Findings: Lesion (old cystic lesions between prashant breasts.  no active inflammation at this time) and rash (erythematic rash under both breasts.) present.   Neurological:      General: No focal deficit present.      Mental Status: She is alert and oriented to person, place, and time.   Psychiatric:         Mood and Affect: Mood normal.         Behavior: Behavior normal.         Thought Content: Thought content normal.         Judgment: Judgment normal.           Assessment/Plan     Diagnoses and all orders for this visit:    1. Rash (Primary)  Comments:  appears to be fungal  keep skin clean and dry  apply nystatin powder  call if no imp  Orders:  -     nystatin (MYCOSTATIN) 203345 UNIT/GM powder; Apply  topically to the appropriate area as directed 3 (Three) Times a Day.  Dispense: 60 g; Refill: 1    2. Sebaceous cyst of skin of breast, unspecified laterality  Comments:  chronic issue  no active infection  referral to gen surgery for poss removal  Orders:  -     Ambulatory Referral to General Surgery

## 2021-06-17 ENCOUNTER — OFFICE VISIT (OUTPATIENT)
Dept: FAMILY MEDICINE CLINIC | Facility: CLINIC | Age: 26
End: 2021-06-17

## 2021-06-17 ENCOUNTER — TELEPHONE (OUTPATIENT)
Dept: FAMILY MEDICINE CLINIC | Facility: CLINIC | Age: 26
End: 2021-06-17

## 2021-06-17 VITALS
SYSTOLIC BLOOD PRESSURE: 146 MMHG | OXYGEN SATURATION: 98 % | TEMPERATURE: 97.8 F | DIASTOLIC BLOOD PRESSURE: 83 MMHG | HEART RATE: 92 BPM | BODY MASS INDEX: 58.59 KG/M2 | WEIGHT: 293 LBS

## 2021-06-17 DIAGNOSIS — R21 RASH: Primary | ICD-10-CM

## 2021-06-17 DIAGNOSIS — N60.89 SEBACEOUS CYST OF SKIN OF BREAST, UNSPECIFIED LATERALITY: ICD-10-CM

## 2021-06-17 PROCEDURE — 99214 OFFICE O/P EST MOD 30 MIN: CPT | Performed by: NURSE PRACTITIONER

## 2021-06-17 RX ORDER — NYSTATIN 100000 [USP'U]/G
POWDER TOPICAL 3 TIMES DAILY
Qty: 60 G | Refills: 1 | Status: SHIPPED | OUTPATIENT
Start: 2021-06-17 | End: 2021-07-07

## 2021-06-17 NOTE — TELEPHONE ENCOUNTER
PATIENT CALLED AND STATED THAT SHE NEEDS A PA FOR    nystatin (MYCOSTATIN) 125803 UNIT/GM powder         PLEASE ADVISE     [phone]

## 2021-06-17 NOTE — PATIENT INSTRUCTIONS
Referral to Dr. Chirinos for cyst removal  Keep skin under breasts clean and dry  Apply nystatin powder  Call for issues or concerns

## 2021-06-17 NOTE — TELEPHONE ENCOUNTER
Not sure why she would need a prior authorization for that  That should not be that expensive  See if the pharmacy has any information as to why it needs a PA, she may have to use [facility]

## 2021-06-17 NOTE — TELEPHONE ENCOUNTER
Left detailed message and told her about using [facility] at [facility] and get the medicine for $ 21.25

## 2021-06-21 ENCOUNTER — TELEPHONE (OUTPATIENT)
Dept: FAMILY MEDICINE CLINIC | Facility: CLINIC | Age: 26
End: 2021-06-21

## 2021-06-21 VITALS — HEIGHT: 61 IN | WEIGHT: 293 LBS | BODY MASS INDEX: 55.32 KG/M2

## 2021-06-21 DIAGNOSIS — G56.01 CARPAL TUNNEL SYNDROME OF RIGHT WRIST: Primary | ICD-10-CM

## 2021-06-21 NOTE — TELEPHONE ENCOUNTER
Caller: Reema Schumacher    Relationship: Self    Best call back number: [phone]    What is the medical concern/diagnosis: CARPAL TUNNEL SYNDROME IN RIGHT HAND    What specialty or service is being requested: ORTHOPEDIC CARE    What is the provider, practice or medical service name: [facility]    What is the office location: [address] [place] [address]    What is the office phone number: [phone] FAX: [phone]      Any additional details: PATIENT ASKED FOR REFERRAL FOR ORTHOPEDIC CARE TO BE SENT TO [facility]

## 2021-06-22 ENCOUNTER — OFFICE VISIT (OUTPATIENT)
Dept: CARDIOLOGY | Facility: CLINIC | Age: 26
End: 2021-06-22

## 2021-06-22 VITALS
HEART RATE: 95 BPM | WEIGHT: 293 LBS | OXYGEN SATURATION: 97 % | HEIGHT: 61 IN | BODY MASS INDEX: 55.32 KG/M2 | SYSTOLIC BLOOD PRESSURE: 145 MMHG | DIASTOLIC BLOOD PRESSURE: 96 MMHG

## 2021-06-22 DIAGNOSIS — I10 ESSENTIAL HYPERTENSION: ICD-10-CM

## 2021-06-22 DIAGNOSIS — E66.01 MORBID OBESITY WITH BMI OF 50.0-59.9, ADULT (HCC): ICD-10-CM

## 2021-06-22 DIAGNOSIS — Z01.810 PREOP CARDIOVASCULAR EXAM: Primary | ICD-10-CM

## 2021-06-22 PROCEDURE — 99204 OFFICE O/P NEW MOD 45 MIN: CPT | Performed by: INTERNAL MEDICINE

## 2021-06-22 PROCEDURE — 93000 ELECTROCARDIOGRAM COMPLETE: CPT | Performed by: INTERNAL MEDICINE

## 2021-06-22 NOTE — PROGRESS NOTES
Encounter Date:2021      Patient ID: Reema Schumacher is a 25 y.o. female.    Chief Complaint:  Obesity  Preoperative cardiovascular evaluation prior to having gastric sleeve surgery.  Elevated blood pressure    History of Present Illness  The patient is a pleasant 25-year-old white female with significant exogenous obesity is here for preoperative cardiovascular evaluation prior to having gastric sleeve surgery which is not scheduled yet (Dr. Chirinos).  Patient is asymptomatic.  Patient has elevated blood pressure but she is not on any medications at this time.    The patient has been without any chest discomfort ,shortness of breath, palpitations, dizziness or syncope.  Denies having any headache ,abdominal pain ,nausea, vomiting , diarrhea constipation, loss of weight or loss of appetite.  Denies having any excessive bruising ,hematuria or blood in the stool.    Review of all systems negative except as indicated.    Reviewed ROS.    Assessment and Plan     ]]]]]]]]]]]]]]]  Impression  =======  -Preoperative cardiovascular evaluation prior to having gastric sleeve surgery.  Patient is asymptomatic without cardiovascular symptoms.    -Essential hypertension.  ADHD GERD  Blood pressure 145/96.    -Exogenous obesity (BMI 59)    -History of     -No known allergies.    -Family history of coronary artery disease    -Former smoker  =========  Plan  ========  EKG showed sinus rhythm without any ischemic changes.  Preoperative cardiovascular evaluation prior to having gastric sleeve surgery.  Patient is asymptomatic.  Patient's blood pressure elevated.  Patient was started on metoprolol XL 25 mg a day.  Echocardiogram.  Medications were reviewed and updated.  Follow-up in the office on the same day.  Further plan will depend on patient's progress.  ]]]]]]]]]]]]]]]]               Diagnosis Plan   1. Preop cardiovascular exam  Adult Transthoracic Echo Complete W/ Cont if Necessary Per Protocol    ECG 12 Lead   2.  Essential hypertension  Adult Transthoracic Echo Complete W/ Cont if Necessary Per Protocol    ECG 12 Lead   3. Morbid obesity with BMI of 50.0-59.9, adult (CMS/HCC)  Adult Transthoracic Echo Complete W/ Cont if Necessary Per Protocol    ECG 12 Lead   LAB RESULTS (LAST 7 DAYS)    CBC        BMP        CMP         BNP        TROPONIN        CoAg        Creatinine Clearance  CrCl cannot be calculated (Patient's most recent lab result is older than the maximum 30 days allowed.).    ABG        Radiology  No radiology results for the last day                The following portions of the patient's history were reviewed and updated as appropriate: allergies, current medications, past family history, past medical history, past social history, past surgical history and problem list.    Review of Systems   Constitutional: Negative for malaise/fatigue.   Cardiovascular: Negative for chest pain, leg swelling, palpitations and syncope.   Respiratory: Negative for shortness of breath.    Skin: Negative for rash.   Gastrointestinal: Negative for nausea and vomiting.   Neurological: Negative for dizziness, light-headedness and numbness.         Current Outpatient Medications:   •  lisdexamfetamine (Vyvanse) 40 MG capsule, Take 1 capsule by mouth Every Morning, Disp: 30 capsule, Rfl: 0  •  nystatin (MYCOSTATIN) 064982 UNIT/GM powder, Apply  topically to the appropriate area as directed 3 (Three) Times a Day., Disp: 60 g, Rfl: 1  •  venlafaxine XR (EFFEXOR-XR) 75 MG 24 hr capsule, Take 1 capsule by mouth Daily., Disp: 90 capsule, Rfl: 1    No Known Allergies    Family History   Problem Relation Age of Onset   • Hypertension Mother    • Obesity Mother    • Heart disease Mother    • Crohn's disease Mother    • Heart attack Father    • Stroke Father    • Diabetes Father        Past Surgical History:   Procedure Laterality Date   •  SECTION  2016   • ENDOSCOPY N/A 2020    Procedure: ESOPHAGOGASTRODUODENOSCOPY WITH BIOPSY X2  "AREAS;  Surgeon: Kenney Hodgson MD;  Location: [facility] ENDOSCOPY;  Service: Gastroenterology;  Laterality: N/A;  duodenitis, duodenal ulcers, gastric ulcers   • PLANTAR'S WART EXCISION Bilateral    • WISDOM TOOTH EXTRACTION         Past Medical History:   Diagnosis Date   • ADHD    • Depression with anxiety    • Family history of diabetes mellitus (DM)    • Family history of heart disease    • GERD (gastroesophageal reflux disease)    • Hypertension    • Obesity        Family History   Problem Relation Age of Onset   • Hypertension Mother    • Obesity Mother    • Heart disease Mother    • Crohn's disease Mother    • Heart attack Father    • Stroke Father    • Diabetes Father        Social History     Socioeconomic History   • Marital status: Single     Spouse name: Not on file   • Number of children: Not on file   • Years of education: Not on file   • Highest education level: Not on file   Tobacco Use   • Smoking status: Former Smoker     Packs/day: 0.50     Years: 6.00     Pack years: 3.00     Types: Cigarettes, Electronic Cigarette     Start date: 2012     Quit date: 2018     Years since quitting: 3.4   • Smokeless tobacco: Never Used   • Tobacco comment: Using nicotine free vape daily   Vaping Use   • Vaping Use: Former   • Substances: CBD   Substance and Sexual Activity   • Alcohol use: Not Currently     Alcohol/week: 0.0 standard drinks     Comment: QUIT   • Drug use: Yes     Types: Marijuana     Comment: daily   • Sexual activity: Yes     Partners: Female     Comment: Same sex relationship           ECG 12 Lead    Date/Time: 6/22/2021 3:18 PM  Performed by: Tara Veras MD  Authorized by: Tara Veras MD   Comparison: compared with previous ECG   Comparison to previous ECG: Normal sinus rhythm nonspecific ST-T wave changes 96/min normal axis normal intervals no ectopy no prior tracing for comparison.                Objective:       Physical Exam    /96   Pulse 95   Ht 153.7 cm (60.5\")   Wt " (!) 138 kg (305 lb)   SpO2 97%   BMI 58.59 kg/m²   The patient is alert, oriented and in no distress.    Vital signs as noted above.  Exogenous obesity.    Head and neck revealed no carotid bruits or jugular venous distension.  No thyromegaly or lymphadenopathy is present.    Lungs clear.  No wheezing.  Breath sounds are normal bilaterally.    Heart normal first and second heart sounds.  No murmur..  No pericardial rub is present.  No gallop is present.    Abdomen soft and nontender.  No organomegaly is present.    Extremities revealed good peripheral pulses without any pedal edema.    Skin warm and dry.    Musculoskeletal system is grossly normal.    CNS grossly normal.    Reviewed and unchanged from last visit.

## 2021-07-03 ENCOUNTER — LAB (OUTPATIENT)
Dept: LAB | Facility: HOSPITAL | Age: 26
End: 2021-07-03

## 2021-07-03 LAB — SARS-COV-2 ORF1AB RESP QL NAA+PROBE: NOT DETECTED

## 2021-07-03 PROCEDURE — C9803 HOPD COVID-19 SPEC COLLECT: HCPCS

## 2021-07-03 PROCEDURE — U0004 COV-19 TEST NON-CDC HGH THRU: HCPCS

## 2021-07-06 ENCOUNTER — ANESTHESIA (OUTPATIENT)
Dept: GASTROENTEROLOGY | Facility: HOSPITAL | Age: 26
End: 2021-07-06

## 2021-07-06 ENCOUNTER — ANESTHESIA EVENT (OUTPATIENT)
Dept: GASTROENTEROLOGY | Facility: HOSPITAL | Age: 26
End: 2021-07-06

## 2021-07-06 ENCOUNTER — ON CAMPUS - OUTPATIENT (OUTPATIENT)
Dept: URBAN - METROPOLITAN AREA HOSPITAL 85 | Facility: HOSPITAL | Age: 26
End: 2021-07-06
Payer: COMMERCIAL

## 2021-07-06 ENCOUNTER — HOSPITAL ENCOUNTER (OUTPATIENT)
Facility: HOSPITAL | Age: 26
Setting detail: HOSPITAL OUTPATIENT SURGERY
Discharge: HOME | End: 2021-07-06
Attending: INTERNAL MEDICINE | Admitting: INTERNAL MEDICINE

## 2021-07-06 VITALS
BODY MASS INDEX: 55.32 KG/M2 | SYSTOLIC BLOOD PRESSURE: 130 MMHG | OXYGEN SATURATION: 93 % | DIASTOLIC BLOOD PRESSURE: 88 MMHG | HEART RATE: 98 BPM | HEIGHT: 61 IN | WEIGHT: 293 LBS | RESPIRATION RATE: 17 BRPM | TEMPERATURE: 98.1 F

## 2021-07-06 DIAGNOSIS — R10.9 ABDOMINAL PAIN: ICD-10-CM

## 2021-07-06 DIAGNOSIS — K25.9 GASTRIC ULCER, UNSPECIFIED AS ACUTE OR CHRONIC, WITHOUT HEMO: ICD-10-CM

## 2021-07-06 DIAGNOSIS — K29.80 DUODENITIS WITHOUT BLEEDING: ICD-10-CM

## 2021-07-06 DIAGNOSIS — R10.9 UNSPECIFIED ABDOMINAL PAIN: ICD-10-CM

## 2021-07-06 DIAGNOSIS — K20.80 OTHER ESOPHAGITIS WITHOUT BLEEDING: ICD-10-CM

## 2021-07-06 DIAGNOSIS — K25.9 GASTRIC ULCER: ICD-10-CM

## 2021-07-06 DIAGNOSIS — K29.00 ACUTE GASTRITIS WITHOUT BLEEDING: ICD-10-CM

## 2021-07-06 PROCEDURE — 43239 EGD BIOPSY SINGLE/MULTIPLE: CPT | Performed by: INTERNAL MEDICINE

## 2021-07-06 PROCEDURE — 25010000002 PROPOFOL 10 MG/ML EMULSION: Performed by: STUDENT IN AN ORGANIZED HEALTH CARE EDUCATION/TRAINING PROGRAM

## 2021-07-06 PROCEDURE — 88342 IMHCHEM/IMCYTCHM 1ST ANTB: CPT | Performed by: INTERNAL MEDICINE

## 2021-07-06 PROCEDURE — 88305 TISSUE EXAM BY PATHOLOGIST: CPT | Performed by: INTERNAL MEDICINE

## 2021-07-06 RX ORDER — SODIUM CHLORIDE 9 MG/ML
1000 INJECTION, SOLUTION INTRAVENOUS CONTINUOUS
Status: DISCONTINUED | OUTPATIENT
Start: 2021-07-06 | End: 2021-07-06 | Stop reason: HOSPADM

## 2021-07-06 RX ORDER — PROPOFOL 10 MG/ML
VIAL (ML) INTRAVENOUS AS NEEDED
Status: DISCONTINUED | OUTPATIENT
Start: 2021-07-06 | End: 2021-07-06 | Stop reason: SURG

## 2021-07-06 RX ORDER — LIDOCAINE HYDROCHLORIDE 20 MG/ML
INJECTION, SOLUTION INFILTRATION; PERINEURAL AS NEEDED
Status: DISCONTINUED | OUTPATIENT
Start: 2021-07-06 | End: 2021-07-06 | Stop reason: SURG

## 2021-07-06 RX ORDER — SODIUM CHLORIDE 0.9 % (FLUSH) 0.9 %
10 SYRINGE (ML) INJECTION AS NEEDED
Status: DISCONTINUED | OUTPATIENT
Start: 2021-07-06 | End: 2021-07-06 | Stop reason: HOSPADM

## 2021-07-06 RX ORDER — ONDANSETRON 2 MG/ML
4 INJECTION INTRAMUSCULAR; INTRAVENOUS ONCE AS NEEDED
Status: DISCONTINUED | OUTPATIENT
Start: 2021-07-06 | End: 2021-07-06 | Stop reason: HOSPADM

## 2021-07-06 RX ORDER — SODIUM CHLORIDE 0.9 % (FLUSH) 0.9 %
3 SYRINGE (ML) INJECTION EVERY 12 HOURS SCHEDULED
Status: DISCONTINUED | OUTPATIENT
Start: 2021-07-06 | End: 2021-07-06 | Stop reason: HOSPADM

## 2021-07-06 RX ADMIN — SODIUM CHLORIDE 1000 ML: 9 INJECTION, SOLUTION INTRAVENOUS at 09:53

## 2021-07-06 RX ADMIN — LIDOCAINE HYDROCHLORIDE 100 MG: 20 INJECTION, SOLUTION INFILTRATION; PERINEURAL at 09:58

## 2021-07-06 RX ADMIN — PROPOFOL 300 MG: 10 INJECTION, EMULSION INTRAVENOUS at 10:04

## 2021-07-06 RX ADMIN — GLYCOPYRROLATE 0.2 MG: 0.2 INJECTION, SOLUTION INTRAMUSCULAR; INTRAVITREAL at 09:58

## 2021-07-06 NOTE — H&P
GI CONSULT  NOTE:    Referring Provider:    Akshat Jones MD  [unfilled]    Chief complaint: <principal problem not specified>    Subjective .       Pre op diagnosis  Gastric ulcer [K25.9]  Abdominal pain [R10.9]      History of present illness:      Reema Schumacher is a 26 y.o. female who presents today for Procedure(s):  ESOPHAGOGASTRODUODENOSCOPY for the indications listed below.     The updated Patient Profile was reviewed prior to the procedure, in conjunction with the Physical Exam, including medical conditions, surgical procedures, medications, allergies, family history and social history.     Pre-operatively, I reviewed the indication(s) for the procedure, the risks of the procedure [including but not limited to: unexpected bleeding possibly requiring hospitalization and/or unplanned repeat procedures, perforation possibly requiring surgical treatment, missed lesions and complications of sedation/MAC (also explained by anesthesia staff)].     I have evaluated the patient for risks associated with the planned anesthesia and the procedure to be performed and find the patient an acceptable candidate for IV sedation.    Multiple opportunities were provided for any questions or concerns, and all questions were answered satisfactorily before any anesthesia was administered. We will proceed with the planned procedure.    Past Medical History:  Past Medical History:   Diagnosis Date   • ADHD    • Depression with anxiety    • Family history of diabetes mellitus (DM)    • Family history of heart disease    • GERD (gastroesophageal reflux disease)    • Hypertension    • Obesity    • PONV (postoperative nausea and vomiting)        Past Surgical History:  Past Surgical History:   Procedure Laterality Date   •  SECTION     • ENDOSCOPY N/A 2020    Procedure: ESOPHAGOGASTRODUODENOSCOPY WITH BIOPSY X2 AREAS;  Surgeon: Kenney Hodgson MD;  Location: [facility] ENDOSCOPY;  Service:  Gastroenterology;  Laterality: N/A;  duodenitis, duodenal ulcers, gastric ulcers   • PLANTAR'S WART EXCISION Bilateral    • WISDOM TOOTH EXTRACTION         Social History:  Social History     Tobacco Use   • Smoking status: Former Smoker     Packs/day: 0.50     Years: 6.00     Pack years: 3.00     Types: Cigarettes, Electronic Cigarette     Start date: 2012     Quit date: 2018     Years since quitting: 3.5   • Smokeless tobacco: Never Used   Vaping Use   • Vaping Use: Former   • Quit date: 5/21/2021   • Substances: CBD   Substance Use Topics   • Alcohol use: Not Currently     Alcohol/week: 0.0 standard drinks     Comment: QUIT   • Drug use: Yes     Types: Marijuana     Comment: daily       Family History:  Family History   Problem Relation Age of Onset   • Hypertension Mother    • Obesity Mother    • Heart disease Mother    • Crohn's disease Mother    • Heart attack Father    • Stroke Father    • Diabetes Father        Medications:  Medications Prior to Admission   Medication Sig Dispense Refill Last Dose   • lisdexamfetamine (Vyvanse) 40 MG capsule Take 1 capsule by mouth Every Morning 30 capsule 0 7/5/2021 at Unknown time   • nystatin (MYCOSTATIN) 036856 UNIT/GM powder Apply  topically to the appropriate area as directed 3 (Three) Times a Day. 60 g 1    • venlafaxine XR (EFFEXOR-XR) 75 MG 24 hr capsule Take 1 capsule by mouth Daily. 90 capsule 1 7/5/2021 at Unknown time       Scheduled Meds:   Continuous Infusions:sodium chloride, 1,000 mL, Last Rate: 1,000 mL (07/06/21 0953)      PRN Meds:.    ALLERGIES:  Patient has no known allergies.    ROS:  The following systems were reviewed and negative;  Constitution:  No fevers, chills, no unintentional weight loss  Skin: no rash, no jaundice  Eyes:  No blurry vision, no eye pain  HENT:  No change in hearing or smell  Resp:  No dyspnea or cough  CV:  No chest pain or palpitations  :  No dysuria, hematuria  Musculoskeletal:  No leg cramps or arthralgias  Neuro:  No  "tremor, no numbness  Psych:  No depression or confsusion    Objective     Vital Signs:   Vitals:    06/30/21 1321 07/06/21 0930   BP:  140/90   BP Location:  Left arm   Patient Position:  Lying   Pulse:  95   Resp:  17   Temp:  98.1 °F (36.7 °C)   TempSrc:  Oral   SpO2:  97%   Weight: (!) 137 kg (301 lb) (!) 141 kg (311 lb 11.7 oz)   Height: 154.9 cm (61\") 154.9 cm (61\")       Physical Exam:       General Appearance:    Awake and alert, in no acute distress   Head:    Normocephalic, without obvious abnormality, atraumatic   Throat:   No oral lesions, no thrush, oral mucosa moist   Lungs:     respirations regular, even and unlabored   Skin:   No rash, no jaundice       Results Review:  Lab Results (last 24 hours)     ** No results found for the last 24 hours. **          Imaging Results (Last 24 Hours)     ** No results found for the last 24 hours. **           I reviewed the patient's labs and imaging.    ASSESSMENT AND PLAN:      Active Problems:    * No active hospital problems. *       Procedure(s):  ESOPHAGOGASTRODUODENOSCOPY      I discussed the patient's findings and my recommendations with the patient.    Kenney Hodgson MD  07/06/21  10:03 EDT              "

## 2021-07-06 NOTE — DISCHARGE INSTRUCTIONS
A responsible adult should stay with you and you should rest quietly for the rest of the day.    Do not drink alcohol, drive, operate any heavy machinery or power tools or make any legal/important decisions for the next 24 hours.     Progress your diet as tolerated.  If you begin to experience severe pain, increased shortness of breath, racing heartbeat or a fever above 101 F, seek immediate medical attention.     Follow up with MD as instructed. Call office for results in 3 to 5 days if needed. [phone]      Recommendations:  Follow biopsy results and treat H. pylori if positive  PPI daily.  There is some confusion whether or not she is actually taking a PPI right now.  She told me before the procedure that she was but she told the preop nurse that she was not and there is nothing on her med list.

## 2021-07-06 NOTE — OP NOTE
ESOPHAGOGASTRODUODENOSCOPY Procedure Report    Patient Name:  Reema Schumacher  YOB: 1995    Date of Surgery:  7/6/2021     Pre-Op Diagnosis:  Gastric ulcer [K25.9]  Abdominal pain [R10.9]   Nausea vomiting       Postop diagnosis:  1.  Gastric ulcers  2.  Duodenitis  3.  Esophagitis    Procedure/CPT® Codes:      Procedure(s):  ESOPHAGOGASTRODUODENOSCOPY WITH BIOPSY X2 AREAS    Staff:  Surgeon(s):  Kenney Hodgson MD      Anesthesia: Monitored Anesthesia Care    Description of Procedure:  A description of the procedure as well as risks, benefits and alternative methods were explained to the patient who voiced understanding and signed the corresponding consent form. A physical exam was performed and vital signs were monitored throughout the procedure.    An upper GI endoscope was placed into the mouth and proceeded through the esophagus, stomach and second portion of the duodenum without difficulty. The scope was then retroflexed and the fundus was visualized. The procedure was not difficult and there were no immediate complications.  There was no blood loss.    Impression:  1.  [place] grade a esophagitis at the GE junction secondary to GERD  2.  Multiple punctate in clean-based ulcers less than 5 mm in diameter in the antrum and prepyloric region, cold forcep biopsies of the antrum and body were taken for histopathology and H. pylori  3.  Erythema and granularity in the duodenal bulb suggestive of duodenitis, cold forcep biopsies of the duodenum were taken in D2 and D1.      Recommendations:  Follow biopsy results and treat H. pylori if positive  PPI daily.  There is some confusion whether or not she is actually taking a PPI right now.  She told me before the procedure that she was but she told the preop nurse that she was not and there is nothing on her med list.      Kenney Hodgson MD     Date: 7/6/2021    Time: 10:17 EDT

## 2021-07-06 NOTE — ANESTHESIA PREPROCEDURE EVALUATION
Anesthesia Evaluation     Patient summary reviewed and Nursing notes reviewed   history of anesthetic complications: PONV  NPO Solid Status: > 8 hours  NPO Liquid Status: > 2 hours           Airway   Mallampati: III  TM distance: >3 FB  Neck ROM: full  No difficulty expected  Dental - normal exam     Pulmonary - negative pulmonary ROS and normal exam   Cardiovascular - normal exam    ECG reviewed    (+) hypertension, hyperlipidemia,       Neuro/Psych  (+) psychiatric history Anxiety, Depression and ADHD,     GI/Hepatic/Renal/Endo    (+) obesity, morbid obesity, GERD,      Musculoskeletal (-) negative ROS    Abdominal   (+) obese,    Substance History - negative use     OB/GYN    (-)  Pregnant        Other - negative ROS                       Anesthesia Plan    ASA 3     MAC   total IV anesthesia  intravenous induction     Anesthetic plan, all risks, benefits, and alternatives have been provided, discussed and informed consent has been obtained with: patient.

## 2021-07-06 NOTE — ANESTHESIA POSTPROCEDURE EVALUATION
Patient: Reema Schumacher    Procedure Summary     Date: 07/06/21 Room / Location: [facility] END[facility]COPY 4 / [facility]    Anesthesia Start: 0957 Anesthesia Stop: 1019    Procedure: ESOPHAGOGASTRODUODENOSCOPY WITH BIOPSY X2 AREAS (N/A ) Diagnosis:       Gastric ulcer      Abdominal pain      (Gastric ulcer [K25.9])      (Abdominal pain [R10.9])    Surgeons: Kenney Hodgson MD Provider: Tony Spencer MD    Anesthesia Type: MAC ASA Status: 3          Anesthesia Type: MAC    Vitals  Vitals Value Taken Time   /88 07/06/21 1034   Temp     Pulse 92 07/06/21 1042   Resp     SpO2 95 % 07/06/21 1042   Vitals shown include unvalidated device data.        Post Anesthesia Care and Evaluation    Patient location during evaluation: PACU  Patient participation: complete - patient participated  Level of consciousness: awake  Pain score: 0  Pain management: adequate  Airway patency: patent  Anesthetic complications: No anesthetic complications  PONV Status: none  Cardiovascular status: acceptable  Respiratory status: acceptable  Hydration status: acceptable    Comments: Patient seen and examined postoperatively; vital signs stable; SpO2 greater than or equal to 90%; cardiopulmonary status stable; nausea/vomiting adequately controlled; pain adequately controlled; no apparent anesthesia complications; patient discharged from anesthesia care when discharge criteria were met

## 2021-07-07 ENCOUNTER — OFFICE VISIT (OUTPATIENT)
Dept: BARIATRICS/WEIGHT MGMT | Facility: CLINIC | Age: 26
End: 2021-07-07

## 2021-07-07 ENCOUNTER — OFFICE VISIT (OUTPATIENT)
Dept: FAMILY MEDICINE CLINIC | Facility: CLINIC | Age: 26
End: 2021-07-07

## 2021-07-07 VITALS
RESPIRATION RATE: 18 BRPM | DIASTOLIC BLOOD PRESSURE: 82 MMHG | TEMPERATURE: 98.7 F | HEIGHT: 61 IN | SYSTOLIC BLOOD PRESSURE: 162 MMHG | WEIGHT: 293 LBS | BODY MASS INDEX: 55.32 KG/M2 | OXYGEN SATURATION: 97 % | HEART RATE: 99 BPM

## 2021-07-07 VITALS
DIASTOLIC BLOOD PRESSURE: 83 MMHG | WEIGHT: 293 LBS | HEART RATE: 103 BPM | SYSTOLIC BLOOD PRESSURE: 140 MMHG | BODY MASS INDEX: 57.93 KG/M2 | TEMPERATURE: 97.8 F | OXYGEN SATURATION: 94 %

## 2021-07-07 DIAGNOSIS — L91.8 INFLAMED SKIN TAG: Primary | ICD-10-CM

## 2021-07-07 DIAGNOSIS — Z01.818 PRE-OP TESTING: Primary | ICD-10-CM

## 2021-07-07 DIAGNOSIS — F98.8 ATTENTION DEFICIT DISORDER (ADD) WITHOUT HYPERACTIVITY: ICD-10-CM

## 2021-07-07 PROCEDURE — 99214 OFFICE O/P EST MOD 30 MIN: CPT | Performed by: NURSE PRACTITIONER

## 2021-07-07 PROCEDURE — 99213 OFFICE O/P EST LOW 20 MIN: CPT | Performed by: FAMILY MEDICINE

## 2021-07-07 RX ORDER — METOPROLOL SUCCINATE 25 MG/1
TABLET, EXTENDED RELEASE ORAL
COMMUNITY
Start: 2021-06-22 | End: 2021-07-07

## 2021-07-07 RX ORDER — PANTOPRAZOLE SODIUM 40 MG/1
TABLET, DELAYED RELEASE ORAL
COMMUNITY
Start: 2021-07-06 | End: 2021-07-07

## 2021-07-07 RX ORDER — SUCRALFATE 1 G/1
1 TABLET ORAL 4 TIMES DAILY
Qty: 120 TABLET | Refills: 1 | Status: SHIPPED | OUTPATIENT
Start: 2021-07-07 | End: 2021-09-07

## 2021-07-07 RX ORDER — VENLAFAXINE HYDROCHLORIDE 75 MG/1
75 CAPSULE, EXTENDED RELEASE ORAL DAILY
Qty: 90 CAPSULE | Refills: 1 | Status: SHIPPED | OUTPATIENT
Start: 2021-07-07 | End: 2021-12-15 | Stop reason: SDUPTHER

## 2021-07-07 RX ORDER — METOPROLOL SUCCINATE 25 MG/1
25 TABLET, EXTENDED RELEASE ORAL DAILY
Qty: 90 TABLET | Refills: 1 | Status: SHIPPED | OUTPATIENT
Start: 2021-07-07 | End: 2022-03-09

## 2021-07-07 RX ORDER — OMEPRAZOLE 40 MG/1
40 CAPSULE, DELAYED RELEASE ORAL DAILY
Qty: 30 CAPSULE | Refills: 6 | Status: SHIPPED | OUTPATIENT
Start: 2021-07-07 | End: 2021-08-09

## 2021-07-07 NOTE — PROGRESS NOTES
[facility] BAR SURG [facility] BARIATRIC SURGERY  [address] [place] [address]  [address] [place] [address]  Dept: [phone]  7/7/2021      Reema Schumacher.  [id number]  [id number]  1995  female      Chief Complaint   Patient presents with   • Nutrition Counseling     SWL #6/6     Weight loss overall 1 pound    The patient is here for month 6 of their pre-operative physician supervised diet. She had a loss of 2 lbs. The patient states that she is following the recommendations given by our office and dietician including a high lean protein, low carb and low fat diet. We recommended adequate fruits and vegetable intake along with limited portion sizes. Patient is working on eliminating fast foods, fried foods, sweets and soda. Reema Schumacher has been increasing her daily water intake. She has been exercising: walking with work      Patient states they have made positive changes including increased protein   The patient admits to be struggling with none usually     Breakfast: protein shakes , eggs and toast   Lunch: toast or fruit or left overs from night before   Dinner: meat and potatoes and veggie , 2 days a week meatless days   Drinks: water, no carbonation , v8 energy drinks , protein shakes  Snacks: none usually , when she does snack chips PRN  Exercise: walking , cleaning job     New goals:   Exercise 20-30 minutes 2-3 times a week outside of work - not met   Eating and drinking separately with 1 meal a day- partially met     Review of Systems   Constitutional: Positive for fatigue.   Respiratory: Negative.    Cardiovascular: Negative.    Gastrointestinal:        GERD   Musculoskeletal: Positive for back pain.     Vitals:    07/07/21 1011   BP: 162/82   Pulse: 99   Resp: 18   Temp: 98.7 °F (37.1 °C)   SpO2: 97%     Patient Active Problem List   Diagnosis   • Abdominal pain   • Acute bronchitis   • ADHD   • Breast cyst, right   • Gastroesophageal  reflux disease   • Hypertension   • Lumbar strain   • Maxillary sinusitis   • Mixed anxiety depressive disorder   • Morbid obesity (CMS/HCC)   • Plantar wart   • Pregnancy   • Shoulder pain, right   • BMI 50.0-59.9, adult (CMS/HCC)   • Pre-operative examination     Body mass index is 58.39 kg/m².    The following portions of the patient's history were reviewed and updated as appropriate: active problem list, medication list, allergies, social history, notes from last encounter    Physical Exam  Constitutional:       Appearance: Normal appearance. She is obese.   Cardiovascular:      Rate and Rhythm: Normal rate and regular rhythm.   Pulmonary:      Effort: Pulmonary effort is normal.      Breath sounds: Normal breath sounds.   Abdominal:      General: Abdomen is flat. Bowel sounds are normal.      Palpations: Abdomen is soft.   Skin:     General: Skin is warm and dry.   Neurological:      General: No focal deficit present.      Mental Status: She is alert and oriented to person, place, and time.   Psychiatric:         Mood and Affect: Mood normal.         Behavior: Behavior normal.         Thought Content: Thought content normal.         Judgment: Judgment normal.         Discussion/Plan:    Obesity/Morbid Obesity: Currently the patient's weight is decreased. There are no medications prescribed.Treatment plan includes prescribed diet, prescribed exercise regimen and behavior modification.    I reviewed the appropriate dietary choices with the patient and encouraged the necessary changes. Recommended at least 70 grams of protein per day, around 35 grams of fats and less than 100 grams of carbohydrates. Reviewed calorie intake if patient wanted to calorie count and/or had BMR. Instructed patient to drink half of body weight in ounces per day and exercise a minimum of 150 minutes per week including both cardio and strength training. Discussed the option of keeping a food journal which will help patient become more  aware of the nutritional value of foods so they are more prepared after surgery.    The patient was given written materials from our office for education.   I answered all of the patients questions regarding dietary changes, exercise or surgical options.  The patient will follow up in 1 month. The total time spent face to face was 30 minutes with 25 minutes spent counseling.    Pt still needs cardiac clearance and blood work/ EKG and chest x ray prior to surgery. She did undergo an EGD with Dr. Hodgson yesterday and this showed multiple gastric ulcers. Pt has not been taking her omeprazole / Protonix for a while now due to running out of the prescription. I did go ahead and refill the omeprazole and also start ashley Carafate. Will discuss with Dr. Chirinos on Friday if her would like her to wait some time to heal from these ulcers before proceeding with gastric sleeve surgery.     Pt also has hx of sebaceous breast cysts and she has seen Dr. Chirinos for these in the past. She is wanting to know if Dr. Chirinos would be willing to remove the cyst walls the same time he is doing her cholecystectomy and gastric sleeve. Will discuss this with Dr. Chirinos on Friday also.       Jud England, [facility] Bariatrics and General Surgery

## 2021-07-07 NOTE — PROGRESS NOTES
Subjective   Reema Schumacher is a 26 y.o. female.     Reema Schumacher is in for follow up on her ADD and recent medication installation. There is no history of chest pain or dyspnea. There is no history of issue with bowel or bladder dysfunction. There is no history of dizziness or lightheadedness. There is no history of issue with sleep or mood. There is no history of issue with present medication.            /83 (BP Location: Left arm, Patient Position: Sitting, Cuff Size: Large Adult)   Pulse 103   Temp 97.8 °F (36.6 °C) (Temporal)   Wt (!) 139 kg (306 lb 9.6 oz)   LMP 06/08/2021   SpO2 94%   BMI 57.93 kg/m²       Chief Complaint   Patient presents with   • ADD     med check 6 wk f/u    • Medication Problem     Dr. Veras told her to take the metaprolol but its discontinued in the med list           Current Outpatient Medications:   •  lisdexamfetamine (Vyvanse) 40 MG capsule, Take 1 capsule by mouth Every Morning, Disp: 30 capsule, Rfl: 0  •  metoprolol succinate XL (TOPROL-XL) 25 MG 24 hr tablet, Take 1 tablet by mouth Daily., Disp: 90 tablet, Rfl: 1  •  omeprazole (priLOSEC) 40 MG capsule, Take 1 capsule by mouth Daily., Disp: 30 capsule, Rfl: 6  •  sucralfate (Carafate) 1 g tablet, Take 1 tablet by mouth 4 (Four) Times a Day., Disp: 120 tablet, Rfl: 1  •  venlafaxine XR (EFFEXOR-XR) 75 MG 24 hr capsule, Take 1 capsule by mouth Daily., Disp: 90 capsule, Rfl: 1        The following portions of the patient's history were reviewed and updated as appropriate: allergies, current medications, past family history, past medical history, past social history, past surgical history, and problem list.    Review of Systems   Constitutional: Negative for activity change, fatigue and fever.   HENT: Negative for congestion, sinus pressure, sinus pain, sore throat and trouble swallowing.    Eyes: Negative for visual disturbance.   Respiratory: Negative for chest tightness, shortness of breath and wheezing.     Cardiovascular: Negative for chest pain.   Gastrointestinal: Negative for abdominal distention, abdominal pain, constipation, diarrhea, nausea and vomiting.   Genitourinary: Negative for difficulty urinating, dysuria, frequency and urgency.   Musculoskeletal: Negative for back pain and neck pain.   Neurological: Positive for numbness. Negative for dizziness, tremors, weakness and light-headedness.   Psychiatric/Behavioral: Negative for agitation, decreased concentration, hallucinations, sleep disturbance and suicidal ideas. The patient is not nervous/anxious.        Objective     Physical Exam  Vitals and nursing note reviewed.   Constitutional:       Appearance: She is obese.   Cardiovascular:      Rate and Rhythm: Normal rate and regular rhythm.      Heart sounds: Normal heart sounds. No murmur heard.     Pulmonary:      Effort: Pulmonary effort is normal.      Breath sounds: No wheezing or rales.   Abdominal:      General: Bowel sounds are normal.      Palpations: Abdomen is soft.      Tenderness: There is no abdominal tenderness. There is no guarding.   Musculoskeletal:      Cervical back: Neck supple.   Lymphadenopathy:      Cervical: No cervical adenopathy.   Skin:     Findings: Lesion (Large skin tag on her neck) present.   Neurological:      Mental Status: She is alert.   Psychiatric:         Mood and Affect: Mood normal.       Skin Tag Removal    Date/Time: 7/7/2021 3:32 PM  Performed by: Akshat Jones MD  Authorized by: Akshat Jones MD   Preparation: Patient was prepped and draped in the usual sterile fashion.  Local anesthesia used: yes    Anesthesia:  Local anesthesia used: yes  Local Anesthetic: lidocaine 1% with epinephrine    Sedation:  Patient sedated: no    Comments: I used a Hyfrecator to remove the skin tag without complication          Assessment/Plan   Problems Addressed this Visit     None      Visit Diagnoses     Inflamed skin tag    -  Primary    Relevant Orders     Skin Tag Removal    Attention deficit disorder (ADD) without hyperactivity        Relevant Medications    lisdexamfetamine (Vyvanse) 40 MG capsule    venlafaxine XR (EFFEXOR-XR) 75 MG 24 hr capsule      Diagnoses       Codes Comments    Inflamed skin tag    -  Primary ICD-10-CM: L91.8  ICD-9-CM: 701.9, 686.9     Attention deficit disorder (ADD) without hyperactivity     ICD-10-CM: F98.8  ICD-9-CM: 314.00         I will update her prescription for Vyvanse and renew her Effexor as well  She is to call me if she has any irritation or problems with the healing from the skin tag removal  I will plan to see her again in 6 months, sooner if needed  I did encourage her to remain on good diet and to work on an exercise plan as well

## 2021-07-08 ENCOUNTER — OFFICE VISIT (OUTPATIENT)
Dept: CARDIOLOGY | Facility: CLINIC | Age: 26
End: 2021-07-08

## 2021-07-08 ENCOUNTER — TELEPHONE (OUTPATIENT)
Dept: FAMILY MEDICINE CLINIC | Facility: CLINIC | Age: 26
End: 2021-07-08

## 2021-07-08 ENCOUNTER — HOSPITAL ENCOUNTER (OUTPATIENT)
Dept: CARDIOLOGY | Facility: HOSPITAL | Age: 26
Discharge: HOME | End: 2021-07-08
Admitting: INTERNAL MEDICINE

## 2021-07-08 ENCOUNTER — TELEPHONE (OUTPATIENT)
Dept: BARIATRICS/WEIGHT MGMT | Facility: CLINIC | Age: 26
End: 2021-07-08

## 2021-07-08 VITALS
WEIGHT: 293 LBS | HEART RATE: 87 BPM | HEIGHT: 61 IN | OXYGEN SATURATION: 96 % | DIASTOLIC BLOOD PRESSURE: 93 MMHG | SYSTOLIC BLOOD PRESSURE: 143 MMHG | BODY MASS INDEX: 55.32 KG/M2

## 2021-07-08 VITALS — BODY MASS INDEX: 55.32 KG/M2 | WEIGHT: 293 LBS | HEIGHT: 61 IN

## 2021-07-08 DIAGNOSIS — Z01.810 PREOP CARDIOVASCULAR EXAM: ICD-10-CM

## 2021-07-08 DIAGNOSIS — E66.01 MORBID OBESITY WITH BMI OF 50.0-59.9, ADULT (HCC): ICD-10-CM

## 2021-07-08 DIAGNOSIS — I10 ESSENTIAL HYPERTENSION: ICD-10-CM

## 2021-07-08 DIAGNOSIS — Z01.810 PREOP CARDIOVASCULAR EXAM: Primary | ICD-10-CM

## 2021-07-08 LAB
BH CV ECHO MEAS - AO MAX PG (FULL): 5.9 MMHG
BH CV ECHO MEAS - AO MAX PG: 10.2 MMHG
BH CV ECHO MEAS - AO MEAN PG (FULL): 3.1 MMHG
BH CV ECHO MEAS - AO MEAN PG: 5.5 MMHG
BH CV ECHO MEAS - AO ROOT AREA (BSA CORRECTED): 1.1
BH CV ECHO MEAS - AO ROOT AREA: 5.3 CM^2
BH CV ECHO MEAS - AO ROOT DIAM: 2.6 CM
BH CV ECHO MEAS - AO V2 MAX: 159.5 CM/SEC
BH CV ECHO MEAS - AO V2 MEAN: 112 CM/SEC
BH CV ECHO MEAS - AO V2 VTI: 26.9 CM
BH CV ECHO MEAS - ASC AORTA: 2.4 CM
BH CV ECHO MEAS - AVA(I,A): 1.7 CM^2
BH CV ECHO MEAS - AVA(I,D): 1.7 CM^2
BH CV ECHO MEAS - AVA(V,A): 1.5 CM^2
BH CV ECHO MEAS - AVA(V,D): 1.5 CM^2
BH CV ECHO MEAS - BSA(HAYCOCK): 2.5 M^2
BH CV ECHO MEAS - BSA: 2.3 M^2
BH CV ECHO MEAS - BZI_BMI: 57.8 KILOGRAMS/M^2
BH CV ECHO MEAS - BZI_METRIC_HEIGHT: 154.9 CM
BH CV ECHO MEAS - BZI_METRIC_WEIGHT: 138.8 KG
BH CV ECHO MEAS - EDV(CUBED): 91.4 ML
BH CV ECHO MEAS - EDV(MOD-SP4): 55.9 ML
BH CV ECHO MEAS - EDV(TEICH): 92.7 ML
BH CV ECHO MEAS - EF(CUBED): 63.7 %
BH CV ECHO MEAS - EF(MOD-SP4): 75.5 %
BH CV ECHO MEAS - EF(TEICH): 55.3 %
BH CV ECHO MEAS - ESV(CUBED): 33.2 ML
BH CV ECHO MEAS - ESV(MOD-SP4): 13.7 ML
BH CV ECHO MEAS - ESV(TEICH): 41.4 ML
BH CV ECHO MEAS - FS: 28.6 %
BH CV ECHO MEAS - IVS/LVPW: 0.93
BH CV ECHO MEAS - IVSD: 1.2 CM
BH CV ECHO MEAS - LV DIASTOLIC VOL/BSA (35-75): 24.7 ML/M^2
BH CV ECHO MEAS - LV MASS(C)D: 209.7 GRAMS
BH CV ECHO MEAS - LV MASS(C)DI: 92.6 GRAMS/M^2
BH CV ECHO MEAS - LV MAX PG: 4.3 MMHG
BH CV ECHO MEAS - LV MEAN PG: 2.4 MMHG
BH CV ECHO MEAS - LV SYSTOLIC VOL/BSA (12-30): 6.1 ML/M^2
BH CV ECHO MEAS - LV V1 MAX: 103.3 CM/SEC
BH CV ECHO MEAS - LV V1 MEAN: 74.5 CM/SEC
BH CV ECHO MEAS - LV V1 VTI: 19.4 CM
BH CV ECHO MEAS - LVIDD: 4.5 CM
BH CV ECHO MEAS - LVIDS: 3.2 CM
BH CV ECHO MEAS - LVOT AREA: 2.4 CM^2
BH CV ECHO MEAS - LVOT DIAM: 1.7 CM
BH CV ECHO MEAS - LVPWD: 1.3 CM
BH CV ECHO MEAS - MV A MAX VEL: 77.8 CM/SEC
BH CV ECHO MEAS - MV DEC SLOPE: 586.8 CM/SEC^2
BH CV ECHO MEAS - MV DEC TIME: 0.15 SEC
BH CV ECHO MEAS - MV E MAX VEL: 89.2 CM/SEC
BH CV ECHO MEAS - MV E/A: 1.1
BH CV ECHO MEAS - MV MAX PG: 4 MMHG
BH CV ECHO MEAS - MV MEAN PG: 2.2 MMHG
BH CV ECHO MEAS - MV V2 MAX: 99.4 CM/SEC
BH CV ECHO MEAS - MV V2 MEAN: 71.5 CM/SEC
BH CV ECHO MEAS - MV V2 VTI: 18.1 CM
BH CV ECHO MEAS - MVA(VTI): 2.6 CM^2
BH CV ECHO MEAS - PULM SYS VEL: 52.5 CM/SEC
BH CV ECHO MEAS - RVDD: 3.3 CM
BH CV ECHO MEAS - SI(AO): 62.5 ML/M^2
BH CV ECHO MEAS - SI(CUBED): 25.7 ML/M^2
BH CV ECHO MEAS - SI(LVOT): 20.5 ML/M^2
BH CV ECHO MEAS - SI(MOD-SP4): 18.6 ML/M^2
BH CV ECHO MEAS - SI(TEICH): 22.7 ML/M^2
BH CV ECHO MEAS - SV(AO): 141.5 ML
BH CV ECHO MEAS - SV(CUBED): 58.2 ML
BH CV ECHO MEAS - SV(LVOT): 46.4 ML
BH CV ECHO MEAS - SV(MOD-SP4): 42.2 ML
BH CV ECHO MEAS - SV(TEICH): 51.3 ML
LV EF 2D ECHO EST: 60 %
MAXIMAL PREDICTED HEART RATE: 195 BPM
STRESS TARGET HR: 166 BPM

## 2021-07-08 PROCEDURE — 93306 TTE W/DOPPLER COMPLETE: CPT | Performed by: INTERNAL MEDICINE

## 2021-07-08 PROCEDURE — 99213 OFFICE O/P EST LOW 20 MIN: CPT | Performed by: INTERNAL MEDICINE

## 2021-07-08 PROCEDURE — 93306 TTE W/DOPPLER COMPLETE: CPT

## 2021-07-08 NOTE — PROGRESS NOTES
Encounter Date:2021  Last seen 2021      Patient ID: Reema Schumacher is a 26 y.o. female.    Chief Complaint:  Obesity  Preoperative cardiovascular evaluation prior to having gastric sleeve surgery.  Elevated blood pressure     History of Present Illness  Patient recently was seen with following history.  Reviewed and updated.    The patient is a pleasant 25-year-old white female with significant exogenous obesity is here for preoperative cardiovascular evaluation prior to having gastric sleeve surgery which is not scheduled yet (Dr. Chirinos).  Patient is asymptomatic.  Patient has elevated blood pressure but she is not on any medications at this time.     The patient has been without any chest discomfort ,shortness of breath, palpitations, dizziness or syncope.  Denies having any headache ,abdominal pain ,nausea, vomiting , diarrhea constipation, loss of weight or loss of appetite.  Denies having any excessive bruising ,hematuria or blood in the stool.     Review of all systems negative except as indicated.     Reviewed ROS.     Assessment and Plan      ]]]]]]]]]]]]]]]  Impression  =======  -Preoperative cardiovascular evaluation prior to having gastric sleeve surgery.  Patient is asymptomatic without cardiovascular symptoms.  Echocardiogram-normal 2021    -Essential hypertension.  ADHD GERD  Blood pressure 145/96.  At last visit.  Today 143/93.     -Exogenous obesity (BMI 59)     -History of      -No known allergies.     -Family history of coronary artery disease     -Former smoker  =========  Plan  ========  EKG showed sinus rhythm without any ischemic changes.  Preoperative cardiovascular evaluation prior to having gastric sleeve surgery.  Patient is asymptomatic.  Patient's blood pressure elevated.  Patient was started on metoprolol XL 25 mg a day.  Patient has been taking metoprolol only for last 1 week.  Echocardiogram.-Normal as above.  Patient was educated regarding the results of the  testing.  Medications were reviewed and updated.  Consideration would be given for increasing the dose of metoprolol XL to 50 mg if patient has persistent elevation of blood pressure.  Okay with plan surgery.  Patient was provided with supporting document.  Follow-up in the office 6 weeks.  Further plan will depend on patient's progress.  ]]]]]]]]]]]]]]]]                  Diagnosis Plan   1. Preop cardiovascular exam     2. Essential hypertension     3. Morbid obesity with BMI of 50.0-59.9, adult (CMS/[facility])     LAB RESULTS (LAST 7 DAYS)    CBC        BMP        CMP         BNP        TROPONIN        CoAg        Creatinine Clearance  CrCl cannot be calculated (Patient's most recent lab result is older than the maximum 30 days allowed.).    ABG        Radiology  No radiology results for the last day                The following portions of the patient's history were reviewed and updated as appropriate: allergies, current medications, past family history, past medical history, past social history, past surgical history and problem list.    Review of Systems   Constitutional: Negative for malaise/fatigue.   Cardiovascular: Negative for chest pain, leg swelling, palpitations and syncope.   Respiratory: Negative for shortness of breath.    Skin: Negative for rash.   Gastrointestinal: Negative for nausea and vomiting.   Neurological: Negative for dizziness, light-headedness and numbness.         Current Outpatient Medications:   •  lisdexamfetamine (Vyvanse) 40 MG capsule, Take 1 capsule by mouth Every Morning, Disp: 30 capsule, Rfl: 0  •  metoprolol succinate XL (TOPROL-XL) 25 MG 24 hr tablet, Take 1 tablet by mouth Daily., Disp: 90 tablet, Rfl: 1  •  omeprazole (priLOSEC) 40 MG capsule, Take 1 capsule by mouth Daily., Disp: 30 capsule, Rfl: 6  •  sucralfate (Carafate) 1 g tablet, Take 1 tablet by mouth 4 (Four) Times a Day., Disp: 120 tablet, Rfl: 1  •  venlafaxine XR (EFFEXOR-XR) 75 MG 24 hr capsule, Take 1 capsule by  mouth Daily., Disp: 90 capsule, Rfl: 1    No Known Allergies    Family History   Problem Relation Age of Onset   • Hypertension Mother    • Obesity Mother    • Heart disease Mother    • Crohn's disease Mother    • Heart attack Father    • Stroke Father    • Diabetes Father        Past Surgical History:   Procedure Laterality Date   •  SECTION  2016   • ENDOSCOPY N/A 2020    Procedure: ESOPHAGOGASTRODUODENOSCOPY WITH BIOPSY X2 AREAS;  Surgeon: Kenney Hodgson MD;  Location: [facility] ENDOSCOPY;  Service: Gastroenterology;  Laterality: N/A;  duodenitis, duodenal ulcers, gastric ulcers   • ENDOSCOPY N/A 2021    Procedure: ESOPHAGOGASTRODUODENOSCOPY WITH BIOPSY X2 AREAS;  Surgeon: Kenney Hodgson MD;  Location: [facility] ENDOSCOPY;  Service: Gastroenterology;  Laterality: N/A;  duodenitis and gastric ulcers, ESOPHAGITIS   • PLANTAR'S WART EXCISION Bilateral    • WISDOM TOOTH EXTRACTION         Past Medical History:   Diagnosis Date   • ADHD    • Depression with anxiety    • Family history of diabetes mellitus (DM)    • Family history of heart disease    • GERD (gastroesophageal reflux disease)    • Hypertension    • Obesity    • PONV (postoperative nausea and vomiting)        Family History   Problem Relation Age of Onset   • Hypertension Mother    • Obesity Mother    • Heart disease Mother    • Crohn's disease Mother    • Heart attack Father    • Stroke Father    • Diabetes Father        Social History     Socioeconomic History   • Marital status: Single     Spouse name: Not on file   • Number of children: Not on file   • Years of education: Not on file   • Highest education level: Not on file   Tobacco Use   • Smoking status: Former Smoker     Packs/day: 0.50     Years: 6.00     Pack years: 3.00     Types: Cigarettes, Electronic Cigarette     Start date:      Quit date: 2018     Years since quitting: 3.5   • Smokeless tobacco: Never Used   Vaping Use   • Vaping Use: Former   • Quit date: 2021   •  "Substances: CBD   Substance and Sexual Activity   • Alcohol use: Not Currently     Alcohol/week: 0.0 standard drinks     Comment: QUIT   • Drug use: Yes     Types: Marijuana     Comment: daily   • Sexual activity: Yes     Partners: Female     Comment: Same sex relationship         Procedures      Objective:       Physical Exam    /93 (BP Location: Right arm, Patient Position: Sitting, Cuff Size: Large Adult) Comment (BP Location): lower arm  Pulse 87   Ht 154.9 cm (61\")   Wt (!) 139 kg (306 lb)   LMP 06/08/2021   SpO2 96%   BMI 57.82 kg/m²   The patient is alert, oriented and in no distress.    Vital signs as noted above.    Head and neck revealed no carotid bruits or jugular venous distension.  No thyromegaly or lymphadenopathy is present.    Lungs clear.  No wheezing.  Breath sounds are normal bilaterally.    Heart normal first and second heart sounds.  No murmur..  No pericardial rub is present.  No gallop is present.    Abdomen soft and nontender.  No organomegaly is present.    Extremities revealed good peripheral pulses without any pedal edema.    Skin warm and dry.    Musculoskeletal system is grossly normal.    CNS grossly normal.    Reviewed and unchanged from last visit.        "
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
## 2021-07-08 NOTE — TELEPHONE ENCOUNTER
GUERA mcgarry thru Cover My Meds/Approved at pharmacy 7/8/21 [place].    Spoke with [facility] pharmacist and he confirmed medication available for $10 to .  7/8/21 [place]

## 2021-07-12 ENCOUNTER — TELEPHONE (OUTPATIENT)
Dept: BARIATRICS/WEIGHT MGMT | Facility: CLINIC | Age: 26
End: 2021-07-12

## 2021-07-12 NOTE — TELEPHONE ENCOUNTER
Dr. Chirinos approved proceeding with bariatric surgery.  Dr. Chirinos wants patient to consult with General surgery for breast cyst removal.  Patient understands and will make another appointment with Dr. Olivia.

## 2021-07-21 ENCOUNTER — LAB (OUTPATIENT)
Dept: LAB | Facility: HOSPITAL | Age: 26
End: 2021-07-21

## 2021-07-21 ENCOUNTER — HOSPITAL ENCOUNTER (OUTPATIENT)
Dept: CARDIOLOGY | Facility: HOSPITAL | Age: 26
Discharge: HOME | End: 2021-07-21

## 2021-07-21 ENCOUNTER — HOSPITAL ENCOUNTER (OUTPATIENT)
Dept: GENERAL RADIOLOGY | Facility: HOSPITAL | Age: 26
Discharge: HOME | End: 2021-07-21

## 2021-07-21 DIAGNOSIS — Z01.818 PRE-OP TESTING: ICD-10-CM

## 2021-07-21 DIAGNOSIS — F12.10 MILD TETRAHYDROCANNABINOL (THC) ABUSE: ICD-10-CM

## 2021-07-21 LAB
25(OH)D3 SERPL-MCNC: 21.4 NG/ML (ref 30–100)
ALBUMIN SERPL-MCNC: 4.7 G/DL (ref 3.5–5.2)
ALBUMIN/GLOB SERPL: 1.6 G/DL
ALP SERPL-CCNC: 69 U/L (ref 39–117)
ALT SERPL W P-5'-P-CCNC: 22 U/L (ref 1–33)
AMPHET+METHAMPHET UR QL: NEGATIVE
ANION GAP SERPL CALCULATED.3IONS-SCNC: 12.8 MMOL/L (ref 5–15)
AST SERPL-CCNC: 15 U/L (ref 1–32)
BARBITURATES UR QL SCN: NEGATIVE
BASOPHILS # BLD AUTO: 0.07 10*3/MM3 (ref 0–0.2)
BASOPHILS NFR BLD AUTO: 0.6 % (ref 0–1.5)
BENZODIAZ UR QL SCN: NEGATIVE
BILIRUB SERPL-MCNC: 0.4 MG/DL (ref 0–1.2)
BUN SERPL-MCNC: 13 MG/DL (ref 6–20)
BUN/CREAT SERPL: 21.7 (ref 7–25)
CALCIUM SPEC-SCNC: 9.4 MG/DL (ref 8.6–10.5)
CANNABINOIDS SERPL QL: NEGATIVE
CHLORIDE SERPL-SCNC: 102 MMOL/L (ref 98–107)
CHOLEST SERPL-MCNC: 217 MG/DL (ref 0–200)
CO2 SERPL-SCNC: 26.2 MMOL/L (ref 22–29)
COCAINE UR QL: NEGATIVE
CREAT SERPL-MCNC: 0.6 MG/DL (ref 0.57–1)
DEPRECATED RDW RBC AUTO: 39.6 FL (ref 37–54)
EOSINOPHIL # BLD AUTO: 0.26 10*3/MM3 (ref 0–0.4)
EOSINOPHIL NFR BLD AUTO: 2.2 % (ref 0.3–6.2)
ERYTHROCYTE [DISTWIDTH] IN BLOOD BY AUTOMATED COUNT: 12.1 % (ref 12.3–15.4)
GFR SERPL CREATININE-BSD FRML MDRD: 122 ML/MIN/1.73
GLOBULIN UR ELPH-MCNC: 2.9 GM/DL
GLUCOSE SERPL-MCNC: 108 MG/DL (ref 65–99)
HBA1C MFR BLD: 5.3 % (ref 3.5–5.6)
HCT VFR BLD AUTO: 41 % (ref 34–46.6)
HDLC SERPL-MCNC: 29 MG/DL (ref 40–60)
HGB BLD-MCNC: 13.7 G/DL (ref 12–15.9)
IMM GRANULOCYTES # BLD AUTO: 0.09 10*3/MM3 (ref 0–0.05)
IMM GRANULOCYTES NFR BLD AUTO: 0.8 % (ref 0–0.5)
LDLC SERPL CALC-MCNC: 148 MG/DL (ref 0–100)
LDLC/HDLC SERPL: 4.97 {RATIO}
LYMPHOCYTES # BLD AUTO: 2.92 10*3/MM3 (ref 0.7–3.1)
LYMPHOCYTES NFR BLD AUTO: 24.9 % (ref 19.6–45.3)
MCH RBC QN AUTO: 30.2 PG (ref 26.6–33)
MCHC RBC AUTO-ENTMCNC: 33.4 G/DL (ref 31.5–35.7)
MCV RBC AUTO: 90.5 FL (ref 79–97)
METHADONE UR QL SCN: NEGATIVE
MONOCYTES # BLD AUTO: 0.56 10*3/MM3 (ref 0.1–0.9)
MONOCYTES NFR BLD AUTO: 4.8 % (ref 5–12)
NEUTROPHILS NFR BLD AUTO: 66.7 % (ref 42.7–76)
NEUTROPHILS NFR BLD AUTO: 7.82 10*3/MM3 (ref 1.7–7)
NRBC BLD AUTO-RTO: 0 /100 WBC (ref 0–0.2)
OPIATES UR QL: NEGATIVE
OXYCODONE UR QL SCN: NEGATIVE
PLATELET # BLD AUTO: 302 10*3/MM3 (ref 140–450)
PMV BLD AUTO: 10.7 FL (ref 6–12)
POTASSIUM SERPL-SCNC: 4.1 MMOL/L (ref 3.5–5.2)
PROT SERPL-MCNC: 7.6 G/DL (ref 6–8.5)
RBC # BLD AUTO: 4.53 10*6/MM3 (ref 3.77–5.28)
SODIUM SERPL-SCNC: 141 MMOL/L (ref 136–145)
TRIGL SERPL-MCNC: 219 MG/DL (ref 0–150)
TSH SERPL DL<=0.05 MIU/L-ACNC: 3.7 UIU/ML (ref 0.27–4.2)
VLDLC SERPL-MCNC: 40 MG/DL (ref 5–40)
WBC # BLD AUTO: 11.72 10*3/MM3 (ref 3.4–10.8)

## 2021-07-21 PROCEDURE — 80053 COMPREHEN METABOLIC PANEL: CPT

## 2021-07-21 PROCEDURE — 80307 DRUG TEST PRSMV CHEM ANLYZR: CPT

## 2021-07-21 PROCEDURE — 80061 LIPID PANEL: CPT

## 2021-07-21 PROCEDURE — 93005 ELECTROCARDIOGRAM TRACING: CPT | Performed by: NURSE PRACTITIONER

## 2021-07-21 PROCEDURE — 84443 ASSAY THYROID STIM HORMONE: CPT

## 2021-07-21 PROCEDURE — G0480 DRUG TEST DEF 1-7 CLASSES: HCPCS

## 2021-07-21 PROCEDURE — 93010 ELECTROCARDIOGRAM REPORT: CPT | Performed by: INTERNAL MEDICINE

## 2021-07-21 PROCEDURE — 83036 HEMOGLOBIN GLYCOSYLATED A1C: CPT

## 2021-07-21 PROCEDURE — 84425 ASSAY OF VITAMIN B-1: CPT

## 2021-07-21 PROCEDURE — 82306 VITAMIN D 25 HYDROXY: CPT

## 2021-07-21 PROCEDURE — 36415 COLL VENOUS BLD VENIPUNCTURE: CPT

## 2021-07-21 PROCEDURE — 71046 X-RAY EXAM CHEST 2 VIEWS: CPT

## 2021-07-21 PROCEDURE — 85025 COMPLETE CBC W/AUTO DIFF WBC: CPT

## 2021-07-21 RX ORDER — ERGOCALCIFEROL 1.25 MG/1
50000 CAPSULE ORAL
Qty: 12 CAPSULE | Refills: 0 | Status: SHIPPED | OUTPATIENT
Start: 2021-07-21 | End: 2022-09-12

## 2021-07-22 LAB
COTININE UR QL SCN: NEGATIVE NG/ML
Lab: NORMAL

## 2021-07-24 LAB — VIT B1 BLD-SCNC: 150 NMOL/L (ref 66.5–200)

## 2021-08-04 LAB — QT INTERVAL: 353 MS

## 2021-08-06 DIAGNOSIS — F98.8 ATTENTION DEFICIT DISORDER (ADD) WITHOUT HYPERACTIVITY: ICD-10-CM

## 2021-08-06 NOTE — TELEPHONE ENCOUNTER
Caller: Reema Schumacher    Relationship: Self    Best call back number: [phone]     Medication needed:   Requested Prescriptions     Pending Prescriptions Disp Refills   • lisdexamfetamine (Vyvanse) 40 MG capsule 30 capsule 0     Sig: Take 1 capsule by mouth Every Morning       When do you need the refill by: ASAP     Does the patient have less than a 3 day supply:  [] Yes  [x] No    What is the patient's preferred pharmacy: [facility] [place] [phone] [place] [phone] [facility]

## 2021-08-09 ENCOUNTER — TELEPHONE (OUTPATIENT)
Dept: BARIATRICS/WEIGHT MGMT | Facility: CLINIC | Age: 26
End: 2021-08-09

## 2021-08-09 RX ORDER — PANTOPRAZOLE SODIUM 40 MG/1
40 TABLET, DELAYED RELEASE ORAL DAILY
Qty: 30 TABLET | Refills: 5 | Status: SHIPPED | OUTPATIENT
Start: 2021-08-09 | End: 2021-08-13

## 2021-08-12 ENCOUNTER — PREP FOR SURGERY (OUTPATIENT)
Dept: OTHER | Facility: HOSPITAL | Age: 26
End: 2021-08-12

## 2021-08-12 PROBLEM — E66.813 OBESITY, CLASS III, BMI 40-49.9 (MORBID OBESITY): Status: ACTIVE | Noted: 2021-08-12

## 2021-08-12 PROBLEM — E66.01 OBESITY, CLASS III, BMI 40-49.9 (MORBID OBESITY) (HCC): Status: ACTIVE | Noted: 2021-08-12

## 2021-08-12 RX ORDER — PROMETHAZINE HYDROCHLORIDE 12.5 MG/1
12.5 TABLET ORAL ONCE
Status: CANCELLED | OUTPATIENT
Start: 2021-08-12 | End: 2021-08-12

## 2021-08-12 RX ORDER — SCOLOPAMINE TRANSDERMAL SYSTEM 1 MG/1
1 PATCH, EXTENDED RELEASE TRANSDERMAL ONCE
Status: CANCELLED | OUTPATIENT
Start: 2021-08-12 | End: 2021-08-12

## 2021-08-12 RX ORDER — CEFAZOLIN SODIUM IN 0.9 % NACL 3 G/100 ML
3 INTRAVENOUS SOLUTION, PIGGYBACK (ML) INTRAVENOUS
Status: CANCELLED | OUTPATIENT
Start: 2021-08-12

## 2021-08-12 RX ORDER — METOCLOPRAMIDE HYDROCHLORIDE 5 MG/ML
10 INJECTION INTRAMUSCULAR; INTRAVENOUS ONCE
Status: CANCELLED | OUTPATIENT
Start: 2021-08-12 | End: 2021-08-12

## 2021-08-12 RX ORDER — SODIUM CHLORIDE 0.9 % (FLUSH) 0.9 %
3-10 SYRINGE (ML) INJECTION AS NEEDED
Status: CANCELLED | OUTPATIENT
Start: 2021-08-12

## 2021-08-12 RX ORDER — PANTOPRAZOLE SODIUM 40 MG/10ML
40 INJECTION, POWDER, LYOPHILIZED, FOR SOLUTION INTRAVENOUS ONCE
Status: CANCELLED | OUTPATIENT
Start: 2021-08-12 | End: 2021-08-12

## 2021-08-12 RX ORDER — GABAPENTIN 250 MG/5ML
300 SOLUTION ORAL ONCE
Status: CANCELLED | OUTPATIENT
Start: 2021-08-12 | End: 2021-08-12

## 2021-08-12 RX ORDER — SODIUM CHLORIDE, SODIUM LACTATE, POTASSIUM CHLORIDE, CALCIUM CHLORIDE 600; 310; 30; 20 MG/100ML; MG/100ML; MG/100ML; MG/100ML
100 INJECTION, SOLUTION INTRAVENOUS CONTINUOUS
Status: CANCELLED | OUTPATIENT
Start: 2021-08-12

## 2021-08-12 RX ORDER — CHLORHEXIDINE GLUCONATE 0.12 MG/ML
15 RINSE ORAL SEE ADMIN INSTRUCTIONS
Status: CANCELLED | OUTPATIENT
Start: 2021-08-12

## 2021-08-12 RX ORDER — PROMETHAZINE HYDROCHLORIDE 25 MG/1
25 SUPPOSITORY RECTAL ONCE
Status: CANCELLED | OUTPATIENT
Start: 2021-08-12

## 2021-08-12 RX ORDER — SODIUM CHLORIDE 0.9 % (FLUSH) 0.9 %
3 SYRINGE (ML) INJECTION EVERY 12 HOURS SCHEDULED
Status: CANCELLED | OUTPATIENT
Start: 2021-08-12

## 2021-08-13 ENCOUNTER — CONSULT (OUTPATIENT)
Dept: BARIATRICS/WEIGHT MGMT | Facility: CLINIC | Age: 26
End: 2021-08-13

## 2021-08-13 VITALS
HEART RATE: 80 BPM | SYSTOLIC BLOOD PRESSURE: 125 MMHG | WEIGHT: 293 LBS | DIASTOLIC BLOOD PRESSURE: 86 MMHG | RESPIRATION RATE: 18 BRPM | TEMPERATURE: 98.5 F | BODY MASS INDEX: 55.32 KG/M2 | HEIGHT: 61 IN | OXYGEN SATURATION: 98 %

## 2021-08-13 DIAGNOSIS — E66.01 MORBID OBESITY (HCC): Primary | ICD-10-CM

## 2021-08-13 PROCEDURE — 99214 OFFICE O/P EST MOD 30 MIN: CPT | Performed by: SURGERY

## 2021-08-13 RX ORDER — OMEPRAZOLE 40 MG/1
40 CAPSULE, DELAYED RELEASE ORAL DAILY
COMMUNITY
Start: 2021-08-09 | End: 2022-09-12

## 2021-08-13 NOTE — PROGRESS NOTES
Bariatric Consult:  Referred by Akshat Jones MD    Reema Schumacher is here today for consult on Consult (Pre Op sleeve 9/2)      History of Present Illness:     Reema Schumacher is a 26 y.o. female with morbid obesity with co-morbidities including hypertension who presents for surgical consultation for the above procedure. Reema has completed the initial intake visit and has been examined by our nurse practitioner, dietician, psychologist and underwent the extensive educational teaching process under the guidance of our bariatric coordinator and myself. Reema also has seen the educational video LASHAWN on the surgical procedure if available. Reema attended today more educational teaching from our bariatric coordinator and myself. Reema has had an extensive medical workup including a visit with their primary care physician, EKG, chest radiograph, blood work, EGD or UGI and possibly further testing. These have been reviewed by me and discussed with the patient. Reema is now ready to proceed with surgery. Reema presently denies nausea, vomiting, fever, chills, chest pain, shortness of air, melena, hematochezia, hemetemesis, dysuria, frequency, hematuria, jaundice or abdominal pain.     Wt Readings from Last 10 Encounters:   08/13/21 (!) 138 kg (304 lb)   07/08/21 (!) 139 kg (306 lb)   07/08/21 (!) 139 kg (306 lb)   07/07/21 (!) 139 kg (306 lb 9.6 oz)   07/07/21 (!) 140 kg (309 lb)   07/06/21 (!) 141 kg (311 lb 11.7 oz)   06/22/21 (!) 138 kg (305 lb)   06/17/21 (!) 138 kg (305 lb)   06/21/21 (!) 139 kg (307 lb)   05/26/21 (!) 139 kg (306 lb)       Her pre-op EGD shows multiple 5 mm ulcers of the antrum and prepyloric region.       Past Medical History:   Diagnosis Date   • ADHD    • Depression with anxiety    • Family history of diabetes mellitus (DM)    • Family history of heart disease    • GERD (gastroesophageal reflux disease)    • Hypertension    • Obesity    • PONV (postoperative nausea and  vomiting)        No diagnosis found.    Past Surgical History:   Procedure Laterality Date   •  SECTION     • ENDOSCOPY N/A 2020    Procedure: ESOPHAGOGASTRODUODENOSCOPY WITH BIOPSY X2 AREAS;  Surgeon: Kenney Hodgson MD;  Location: [facility] ENDOSCOPY;  Service: Gastroenterology;  Laterality: N/A;  duodenitis, duodenal ulcers, gastric ulcers   • ENDOSCOPY N/A 2021    Procedure: ESOPHAGOGASTRODUODENOSCOPY WITH BIOPSY X2 AREAS;  Surgeon: Kenney Hodgson MD;  Location: [facility] ENDOSCOPY;  Service: Gastroenterology;  Laterality: N/A;  duodenitis and gastric ulcers, ESOPHAGITIS   • PLANTAR'S WART EXCISION Bilateral    • WISDOM TOOTH EXTRACTION         Patient Active Problem List   Diagnosis   • Abdominal pain   • Acute bronchitis   • ADHD   • Breast cyst, right   • Gastroesophageal reflux disease   • Hypertension   • Lumbar strain   • Maxillary sinusitis   • Mixed anxiety depressive disorder   • Morbid obesity (CMS/HCC)   • Plantar wart   • Pregnancy   • Shoulder pain, right   • BMI 50.0-59.9, adult (CMS/HCC)   • Pre-operative examination   • Obesity, Class III, BMI 40-49.9 (morbid obesity) (CMS/HCC)       No Known Allergies      Current Outpatient Medications:   •  lisdexamfetamine (Vyvanse) 40 MG capsule, Take 1 capsule by mouth Every Morning, Disp: 30 capsule, Rfl: 0  •  metoprolol succinate XL (TOPROL-XL) 25 MG 24 hr tablet, Take 1 tablet by mouth Daily., Disp: 90 tablet, Rfl: 1  •  omeprazole (priLOSEC) 40 MG capsule, , Disp: , Rfl:   •  sucralfate (Carafate) 1 g tablet, Take 1 tablet by mouth 4 (Four) Times a Day., Disp: 120 tablet, Rfl: 1  •  venlafaxine XR (EFFEXOR-XR) 75 MG 24 hr capsule, Take 1 capsule by mouth Daily., Disp: 90 capsule, Rfl: 1  •  vitamin D (ERGOCALCIFEROL) 1.25 MG (94098 UT) capsule capsule, Take 1 capsule by mouth Every 7 (Seven) Days., Disp: 12 capsule, Rfl: 0    Social History     Socioeconomic History   • Marital status: Single     Spouse name: Not on file   • Number of  children: Not on file   • Years of education: Not on file   • Highest education level: Not on file   Tobacco Use   • Smoking status: Former Smoker     Packs/day: 0.50     Years: 6.00     Pack years: 3.00     Types: Cigarettes, Electronic Cigarette     Start date: 2012     Quit date: 2018     Years since quitting: 3.6   • Smokeless tobacco: Never Used   Vaping Use   • Vaping Use: Former   • Quit date: 5/21/2021   • Substances: CBD   Substance and Sexual Activity   • Alcohol use: Not Currently     Alcohol/week: 0.0 standard drinks     Comment: QUIT   • Drug use: Yes     Types: Marijuana     Comment: daily   • Sexual activity: Yes     Partners: Female     Comment: Same sex relationship       Family History   Problem Relation Age of Onset   • Hypertension Mother    • Obesity Mother    • Heart disease Mother    • Crohn's disease Mother    • Heart attack Father    • Stroke Father    • Diabetes Father        Review of Systems:  Review of Systems   Constitutional: Negative.    HENT: Negative.    Eyes: Negative.    Respiratory: Negative.    Cardiovascular: Negative.    Gastrointestinal: Negative.    Endocrine: Negative.    Genitourinary: Negative.    Musculoskeletal: Negative.    Skin: Negative.    Allergic/Immunologic: Negative.    Neurological: Negative.    Hematological: Negative.    Psychiatric/Behavioral: Negative.          Physical Exam:    Vital Signs:  Weight: (!) 138 kg (304 lb)   Body mass index is 57.44 kg/m².  Temp: 98.5 °F (36.9 °C)   Heart Rate: 80   BP: 125/86       Physical Exam  Vitals reviewed.   Constitutional:       Appearance: She is well-developed.   HENT:      Head: Normocephalic and atraumatic.   Eyes:      Conjunctiva/sclera: Conjunctivae normal.      Pupils: Pupils are equal, round, and reactive to light.   Cardiovascular:      Rate and Rhythm: Normal rate and regular rhythm.      Heart sounds: Normal heart sounds.   Pulmonary:      Effort: Pulmonary effort is normal.      Breath sounds: Normal  breath sounds.   Abdominal:      General: Bowel sounds are normal. There is no distension.      Palpations: Abdomen is soft. There is no mass.      Tenderness: There is no abdominal tenderness. There is no guarding or rebound.      Hernia: No hernia is present.   Musculoskeletal:         General: Normal range of motion.      Cervical back: Normal range of motion and neck supple.   Skin:     General: Skin is warm and dry.   Neurological:      Mental Status: She is alert and oriented to person, place, and time.           Assessment:    Reema Schumacher is a 26 y.o. year old female with medically complicated severe obesity with a BMI of Body mass index is 57.44 kg/m². and multiple co-morbidities listed in the encounter diagnosis.    I think she is an appropriate candidate for this surgery, and is ready to proceed.      The patient has returned to the office for a surgical consultation and has requested to proceed with a laparoscopic gastric sleeve.  I have had the opportunity to obtain a history, examine the patient and review the patient's chart.    The patient understands that surgery is a tool and that weight loss is not guaranteed but only seen in the context of appropriate use, regular follow up, exercise and making appropriate food choices.     I personally discussed the potential complications of the laparoscopic gastric sleeve with this patient.  The patient is well aware of potential complications of the surgery that include but not limited to bleeding, infections, deep vein thrombosis, pulmonary embolism, pulmonary complications such as pneumonia, cardiac event, hernias, small bowel obstruction, damage to the spleen or other organs, bowel injury, disfiguring scars, failure to lose weight, need for additional surgery, conversion to an open procedure and death.  The patient is also aware of complications which apply in particular to the gastric sleeve and can include but not limited to the leakage of gastric  contents at the staple line, the development of an intra-abdominal abscess, gastroesophageal reflux disease, Chavez's esophagus, ulcers, vitamin/mineral deficiencies, strictures, and the possibility of converting this procedure to a Verenice-en-Y gastric bypass. The patient also understands the possibility of requiring an acid reducer medication for the rest of their life.    The risks, benefits, potential complications and alternative therapies were discussed at great length as outlined in our extensive consent forms, online consent and educational teaching processes.    The patient has confirmed the participation in the programs extensive educational activities.    All questions and concerns were answered to patient's satisfaction.  The patient now wishes to proceed with surgery.    Patient has [default value] the pre-operative insertion of an IVC filter.     The patient has [default value] a postoperative course of anitcoagulant therapy.        Plan/Discussion/Summary:        I instructed patient to start on a H2 blocker or proton pump inhibitor if not already on one of these medications.    I explained in detail the procedures that we perform.  All of these procedures have a chance to convert to open if any technical challenges or complications do occur.  Bariatric surgery is not cosmetic surgery but rather a tool to help a patient make a life-long commitment lifestyle change including diet, exercise, behavior changes, and taking supplemental vitamins and minerals.    Problems after surgery may require more operations to correct them.    The risks, benefits, alternatives, and potential complications of all of the procedures were explained in detail including, but not limited to death, anesthesia and medication adverse effect, deep venous thrombosis, pulmonary embolism, trocar site/incisional hernia, wound infection, abdominal infection, bleeding, failure to lose weight, gain weight, a change in body image,  metabolic complications with vitamin deficiences and anemia.    Weight loss expectations were discussed with the patient in detail. The weight loss operations most commonly performed are the sleeve gastrectomy and the Verenice-en-Y gastric bypass. These operations result in weight losses up to approximately 25-35% of initial body weight 12 to 24 months after surgery with the gastric bypass usually the higher percent of weight loss but depends on patient using the tool.    For the gastric bypass and loop duodenal switch (LIZ-S) the risks include but not limited to the following early complications:  Anastomotic leak/peritonitis, Verenice/Alimentary/biliopancreatic limb obstruction, severe & minor wound infection/seroma, and nausea/vomiting.  Late complications can include but are not limited to malnutrition, vitamin deficiencies, frequent loose stools,  stomal stenosis, marginal ulcer, bowel obstruction, intussusception, internal, and incisional hernia.    Regarding the gastric sleeve, there is less long-term outcome data and higher risk of dysphagia and reflux compared to a gastric bypass, as well as risk of internal visceral/organ injury, splenectomy, bleeding, infection, leak (which could require further intervention possible conversion to Verenice-en-Y gastric bypass), stenosis and possibility of regaining weight.    Reema was counseled regarding diagnostic results, instructions for management, risk factor reductions, prognosis, patient and family education, impressions, risks and benefits of treatment options and importance of compliance with treatment. Total face to face time of the encounter was over 45 minutes and over 30 minutes was spent counseling.     [place] Report   As part of this patient's treatment plan I am prescribing controlled substances. The patient has been made aware of appropriate use of such medications, including potential risk of somnolence, limited ability to drive and /or work safely, and  potential for dependence or overdose. It has also been made clear that these medications are for use by this patient only, without concomitant use of alcohol or other substances unless prescribed.    Reema has completed prescribing agreement detailing terms of continued prescribing of controlled substances, including monitoring WIL reports, urine drug screening, and pill counts if necessary. Reema is aware that inappropriate use will result in cessation of prescribing such medications.    WIL report has been reviewed      History and physical exam exhibit continued safe and appropriate use of controlled substances.      Reema understands the surgical procedures and the different surgical options that are available.  She understands the lifestyle changes that are required after surgery and has agreed to follow the guidelines outlined in the weight management program.  She also expressed understanding of the risks involved and had all of female questions answered and desires to proceed.      Yesenia Chirinos MD  8/13/2021

## 2021-08-20 RX ORDER — PANTOPRAZOLE SODIUM 40 MG/1
40 TABLET, DELAYED RELEASE ORAL 2 TIMES DAILY
COMMUNITY
End: 2022-03-09

## 2021-08-24 ENCOUNTER — LAB (OUTPATIENT)
Dept: LAB | Facility: HOSPITAL | Age: 26
End: 2021-08-24

## 2021-08-24 LAB
ABO GROUP BLD: NORMAL
ALBUMIN SERPL-MCNC: 4.6 G/DL (ref 3.5–5.2)
ALBUMIN/GLOB SERPL: 1.6 G/DL
ALP SERPL-CCNC: 64 U/L (ref 39–117)
ALT SERPL W P-5'-P-CCNC: 28 U/L (ref 1–33)
ANION GAP SERPL CALCULATED.3IONS-SCNC: 11.9 MMOL/L (ref 5–15)
APTT PPP: 28.9 SECONDS (ref 24–31)
AST SERPL-CCNC: 23 U/L (ref 1–32)
BILIRUB SERPL-MCNC: 0.3 MG/DL (ref 0–1.2)
BLD GP AB SCN SERPL QL: NEGATIVE
BUN SERPL-MCNC: 12 MG/DL (ref 6–20)
BUN/CREAT SERPL: 23.1 (ref 7–25)
CALCIUM SPEC-SCNC: 9.2 MG/DL (ref 8.6–10.5)
CHLORIDE SERPL-SCNC: 105 MMOL/L (ref 98–107)
CO2 SERPL-SCNC: 25.1 MMOL/L (ref 22–29)
CREAT SERPL-MCNC: 0.52 MG/DL (ref 0.57–1)
DEPRECATED RDW RBC AUTO: 40.7 FL (ref 37–54)
ERYTHROCYTE [DISTWIDTH] IN BLOOD BY AUTOMATED COUNT: 12.4 % (ref 12.3–15.4)
GFR SERPL CREATININE-BSD FRML MDRD: 144 ML/MIN/1.73
GLOBULIN UR ELPH-MCNC: 2.9 GM/DL
GLUCOSE SERPL-MCNC: 92 MG/DL (ref 65–99)
HCT VFR BLD AUTO: 40.6 % (ref 34–46.6)
HGB BLD-MCNC: 13.3 G/DL (ref 12–15.9)
INR PPP: 0.97 (ref 0.93–1.1)
MCH RBC QN AUTO: 29.8 PG (ref 26.6–33)
MCHC RBC AUTO-ENTMCNC: 32.8 G/DL (ref 31.5–35.7)
MCV RBC AUTO: 90.8 FL (ref 79–97)
PLATELET # BLD AUTO: 316 10*3/MM3 (ref 140–450)
PMV BLD AUTO: 10.5 FL (ref 6–12)
POTASSIUM SERPL-SCNC: 4 MMOL/L (ref 3.5–5.2)
PROT SERPL-MCNC: 7.5 G/DL (ref 6–8.5)
PROTHROMBIN TIME: 10.8 SECONDS (ref 9.6–11.7)
RBC # BLD AUTO: 4.47 10*6/MM3 (ref 3.77–5.28)
RH BLD: POSITIVE
SODIUM SERPL-SCNC: 142 MMOL/L (ref 136–145)
T&S EXPIRATION DATE: NORMAL
WBC # BLD AUTO: 9.56 10*3/MM3 (ref 3.4–10.8)

## 2021-08-24 PROCEDURE — 86850 RBC ANTIBODY SCREEN: CPT

## 2021-08-24 PROCEDURE — 86900 BLOOD TYPING SEROLOGIC ABO: CPT

## 2021-08-24 PROCEDURE — 86901 BLOOD TYPING SEROLOGIC RH(D): CPT

## 2021-08-24 PROCEDURE — 85027 COMPLETE CBC AUTOMATED: CPT

## 2021-08-24 PROCEDURE — 85730 THROMBOPLASTIN TIME PARTIAL: CPT

## 2021-08-24 PROCEDURE — 85610 PROTHROMBIN TIME: CPT

## 2021-08-24 PROCEDURE — 36415 COLL VENOUS BLD VENIPUNCTURE: CPT

## 2021-08-24 PROCEDURE — 80053 COMPREHEN METABOLIC PANEL: CPT

## 2021-08-28 ENCOUNTER — LAB (OUTPATIENT)
Dept: LAB | Facility: HOSPITAL | Age: 26
End: 2021-08-28

## 2021-08-28 PROCEDURE — C9803 HOPD COVID-19 SPEC COLLECT: HCPCS

## 2021-08-28 PROCEDURE — U0004 COV-19 TEST NON-CDC HGH THRU: HCPCS

## 2021-08-29 LAB — SARS-COV-2 ORF1AB RESP QL NAA+PROBE: NOT DETECTED

## 2021-08-30 ENCOUNTER — ANESTHESIA EVENT (OUTPATIENT)
Dept: PERIOP | Facility: HOSPITAL | Age: 26
End: 2021-08-30

## 2021-08-31 ENCOUNTER — ANESTHESIA (OUTPATIENT)
Dept: PERIOP | Facility: HOSPITAL | Age: 26
End: 2021-08-31

## 2021-08-31 ENCOUNTER — HOSPITAL ENCOUNTER (INPATIENT)
Facility: HOSPITAL | Age: 26
LOS: 1 days | Discharge: HOME | End: 2021-09-01
Attending: SURGERY | Admitting: SURGERY

## 2021-08-31 DIAGNOSIS — E66.01 OBESITY, CLASS III, BMI 40-49.9 (MORBID OBESITY) (HCC): Primary | ICD-10-CM

## 2021-08-31 LAB — B-HCG UR QL: NEGATIVE

## 2021-08-31 PROCEDURE — 25010000002 METOCLOPRAMIDE PER 10 MG: Performed by: SURGERY

## 2021-08-31 PROCEDURE — 63710000001 PROMETHAZINE PER 12.5 MG: Performed by: SURGERY

## 2021-08-31 PROCEDURE — 43775 LAP SLEEVE GASTRECTOMY: CPT | Performed by: REGISTERED NURSE

## 2021-08-31 PROCEDURE — 25010000002 ONDANSETRON PER 1 MG: Performed by: SURGERY

## 2021-08-31 PROCEDURE — 25010000002 PROCHLORPERAZINE 10 MG/2ML SOLUTION: Performed by: SURGERY

## 2021-08-31 PROCEDURE — 81025 URINE PREGNANCY TEST: CPT | Performed by: SURGERY

## 2021-08-31 PROCEDURE — 63710000001 PROMETHAZINE PER 25 MG: Performed by: NURSE ANESTHETIST, CERTIFIED REGISTERED

## 2021-08-31 PROCEDURE — 25010000002 MORPHINE PER 10 MG: Performed by: SURGERY

## 2021-08-31 PROCEDURE — 25010000002 ONDANSETRON PER 1 MG: Performed by: NURSE ANESTHETIST, CERTIFIED REGISTERED

## 2021-08-31 PROCEDURE — 25010000002 PROPOFOL 10 MG/ML EMULSION: Performed by: NURSE ANESTHETIST, CERTIFIED REGISTERED

## 2021-08-31 PROCEDURE — 43775 LAP SLEEVE GASTRECTOMY: CPT | Performed by: SURGERY

## 2021-08-31 PROCEDURE — 25010000002 FENTANYL CITRATE (PF) 50 MCG/ML SOLUTION: Performed by: NURSE ANESTHETIST, CERTIFIED REGISTERED

## 2021-08-31 PROCEDURE — 25010000002 MIDAZOLAM PER 1 MG: Performed by: NURSE ANESTHETIST, CERTIFIED REGISTERED

## 2021-08-31 PROCEDURE — C1889 IMPLANT/INSERT DEVICE, NOC: HCPCS | Performed by: SURGERY

## 2021-08-31 PROCEDURE — 25010000002 FENTANYL CITRATE (PF) 100 MCG/2ML SOLUTION: Performed by: NURSE ANESTHETIST, CERTIFIED REGISTERED

## 2021-08-31 PROCEDURE — C9290 INJ, BUPIVACAINE LIPOSOME: HCPCS | Performed by: SURGERY

## 2021-08-31 PROCEDURE — 88307 TISSUE EXAM BY PATHOLOGIST: CPT | Performed by: SURGERY

## 2021-08-31 PROCEDURE — 25010000002 DEXAMETHASONE PER 1 MG: Performed by: NURSE ANESTHETIST, CERTIFIED REGISTERED

## 2021-08-31 PROCEDURE — 8E0W4CZ ROBOTIC ASSISTED PROCEDURE OF TRUNK REGION, PERCUTANEOUS ENDOSCOPIC APPROACH: ICD-10-PCS | Performed by: SURGERY

## 2021-08-31 PROCEDURE — 0DB64Z3 EXCISION OF STOMACH, PERCUTANEOUS ENDOSCOPIC APPROACH, VERTICAL: ICD-10-PCS | Performed by: SURGERY

## 2021-08-31 PROCEDURE — 25010000003 BUPIVACAINE LIPOSOME 1.3 % SUSPENSION: Performed by: SURGERY

## 2021-08-31 PROCEDURE — 25010000002 HYDROMORPHONE PER 4 MG: Performed by: NURSE ANESTHETIST, CERTIFIED REGISTERED

## 2021-08-31 DEVICE — STAPLER 60 RELOAD WHITE
Type: IMPLANTABLE DEVICE | Site: STOMACH | Status: FUNCTIONAL
Brand: SUREFORM

## 2021-08-31 DEVICE — STAPLER 60 RELOAD BLUE
Type: IMPLANTABLE DEVICE | Site: STOMACH | Status: FUNCTIONAL
Brand: SUREFORM

## 2021-08-31 RX ORDER — PROMETHAZINE HYDROCHLORIDE 25 MG/1
12.5 TABLET ORAL EVERY 6 HOURS PRN
Status: DISCONTINUED | OUTPATIENT
Start: 2021-08-31 | End: 2021-09-01 | Stop reason: HOSPADM

## 2021-08-31 RX ORDER — HYDROMORPHONE HCL 110MG/55ML
0.5 PATIENT CONTROLLED ANALGESIA SYRINGE INTRAVENOUS
Status: DISCONTINUED | OUTPATIENT
Start: 2021-08-31 | End: 2021-09-01 | Stop reason: HOSPADM

## 2021-08-31 RX ORDER — PROMETHAZINE HYDROCHLORIDE 12.5 MG/1
12.5 SUPPOSITORY RECTAL EVERY 6 HOURS PRN
Status: DISCONTINUED | OUTPATIENT
Start: 2021-08-31 | End: 2021-09-01 | Stop reason: HOSPADM

## 2021-08-31 RX ORDER — ALBUTEROL SULFATE 2.5 MG/3ML
2.5 SOLUTION RESPIRATORY (INHALATION) EVERY 4 HOURS PRN
Status: DISCONTINUED | OUTPATIENT
Start: 2021-08-31 | End: 2021-09-01 | Stop reason: HOSPADM

## 2021-08-31 RX ORDER — HALOPERIDOL 5 MG/ML
0.5 INJECTION INTRAMUSCULAR EVERY 4 HOURS PRN
Status: DISCONTINUED | OUTPATIENT
Start: 2021-08-31 | End: 2021-09-01 | Stop reason: HOSPADM

## 2021-08-31 RX ORDER — SODIUM CHLORIDE, SODIUM LACTATE, POTASSIUM CHLORIDE, CALCIUM CHLORIDE 600; 310; 30; 20 MG/100ML; MG/100ML; MG/100ML; MG/100ML
INJECTION, SOLUTION INTRAVENOUS CONTINUOUS PRN
Status: DISCONTINUED | OUTPATIENT
Start: 2021-08-31 | End: 2021-08-31 | Stop reason: SURG

## 2021-08-31 RX ORDER — ONDANSETRON 2 MG/ML
4 INJECTION INTRAMUSCULAR; INTRAVENOUS EVERY 6 HOURS PRN
Status: DISCONTINUED | OUTPATIENT
Start: 2021-08-31 | End: 2021-09-01 | Stop reason: HOSPADM

## 2021-08-31 RX ORDER — HYDRALAZINE HYDROCHLORIDE 20 MG/ML
5 INJECTION INTRAMUSCULAR; INTRAVENOUS
Status: DISCONTINUED | OUTPATIENT
Start: 2021-08-31 | End: 2021-08-31 | Stop reason: HOSPADM

## 2021-08-31 RX ORDER — PROMETHAZINE HYDROCHLORIDE 25 MG/1
25 TABLET ORAL ONCE AS NEEDED
Status: COMPLETED | OUTPATIENT
Start: 2021-08-31 | End: 2021-08-31

## 2021-08-31 RX ORDER — NALOXONE HCL 0.4 MG/ML
0.1 VIAL (ML) INJECTION
Status: DISCONTINUED | OUTPATIENT
Start: 2021-08-31 | End: 2021-09-01 | Stop reason: HOSPADM

## 2021-08-31 RX ORDER — SODIUM CHLORIDE, SODIUM LACTATE, POTASSIUM CHLORIDE, CALCIUM CHLORIDE 600; 310; 30; 20 MG/100ML; MG/100ML; MG/100ML; MG/100ML
150 INJECTION, SOLUTION INTRAVENOUS CONTINUOUS
Status: DISCONTINUED | OUTPATIENT
Start: 2021-08-31 | End: 2021-09-01 | Stop reason: HOSPADM

## 2021-08-31 RX ORDER — PANTOPRAZOLE SODIUM 40 MG/1
40 TABLET, DELAYED RELEASE ORAL DAILY
Status: DISCONTINUED | OUTPATIENT
Start: 2021-09-01 | End: 2021-09-01 | Stop reason: HOSPADM

## 2021-08-31 RX ORDER — METOCLOPRAMIDE HYDROCHLORIDE 5 MG/ML
10 INJECTION INTRAMUSCULAR; INTRAVENOUS ONCE
Status: COMPLETED | OUTPATIENT
Start: 2021-08-31 | End: 2021-08-31

## 2021-08-31 RX ORDER — PROMETHAZINE HYDROCHLORIDE 25 MG/1
25 SUPPOSITORY RECTAL ONCE AS NEEDED
Status: COMPLETED | OUTPATIENT
Start: 2021-08-31 | End: 2021-08-31

## 2021-08-31 RX ORDER — SODIUM CHLORIDE 0.9 % (FLUSH) 0.9 %
3 SYRINGE (ML) INJECTION EVERY 12 HOURS SCHEDULED
Status: DISCONTINUED | OUTPATIENT
Start: 2021-08-31 | End: 2021-09-01 | Stop reason: HOSPADM

## 2021-08-31 RX ORDER — FENTANYL CITRATE 50 UG/ML
INJECTION, SOLUTION INTRAMUSCULAR; INTRAVENOUS AS NEEDED
Status: DISCONTINUED | OUTPATIENT
Start: 2021-08-31 | End: 2021-08-31 | Stop reason: SURG

## 2021-08-31 RX ORDER — ONDANSETRON 2 MG/ML
4 INJECTION INTRAMUSCULAR; INTRAVENOUS ONCE AS NEEDED
Status: COMPLETED | OUTPATIENT
Start: 2021-08-31 | End: 2021-08-31

## 2021-08-31 RX ORDER — DIPHENHYDRAMINE HYDROCHLORIDE 50 MG/ML
25 INJECTION INTRAMUSCULAR; INTRAVENOUS EVERY 4 HOURS PRN
Status: DISCONTINUED | OUTPATIENT
Start: 2021-08-31 | End: 2021-09-01 | Stop reason: HOSPADM

## 2021-08-31 RX ORDER — LIDOCAINE HYDROCHLORIDE 20 MG/ML
INJECTION, SOLUTION EPIDURAL; INFILTRATION; INTRACAUDAL; PERINEURAL AS NEEDED
Status: DISCONTINUED | OUTPATIENT
Start: 2021-08-31 | End: 2021-08-31 | Stop reason: SURG

## 2021-08-31 RX ORDER — HYDROXYZINE HYDROCHLORIDE 10 MG/1
10 TABLET, FILM COATED ORAL NIGHTLY PRN
COMMUNITY
End: 2022-03-09 | Stop reason: SDUPTHER

## 2021-08-31 RX ORDER — SCOLOPAMINE TRANSDERMAL SYSTEM 1 MG/1
1 PATCH, EXTENDED RELEASE TRANSDERMAL ONCE
Status: DISCONTINUED | OUTPATIENT
Start: 2021-08-31 | End: 2021-08-31

## 2021-08-31 RX ORDER — PROMETHAZINE HYDROCHLORIDE 12.5 MG/1
12.5 TABLET ORAL ONCE
Status: COMPLETED | OUTPATIENT
Start: 2021-08-31 | End: 2021-08-31

## 2021-08-31 RX ORDER — SODIUM CHLORIDE 0.9 % (FLUSH) 0.9 %
10 SYRINGE (ML) INJECTION EVERY 12 HOURS SCHEDULED
Status: DISCONTINUED | OUTPATIENT
Start: 2021-08-31 | End: 2021-08-31 | Stop reason: HOSPADM

## 2021-08-31 RX ORDER — HYDROMORPHONE HCL 110MG/55ML
PATIENT CONTROLLED ANALGESIA SYRINGE INTRAVENOUS AS NEEDED
Status: DISCONTINUED | OUTPATIENT
Start: 2021-08-31 | End: 2021-08-31 | Stop reason: SURG

## 2021-08-31 RX ORDER — HYDROXYZINE HYDROCHLORIDE 10 MG/1
10 TABLET, FILM COATED ORAL NIGHTLY PRN
Status: DISCONTINUED | OUTPATIENT
Start: 2021-08-31 | End: 2021-09-01 | Stop reason: HOSPADM

## 2021-08-31 RX ORDER — ROCURONIUM BROMIDE 10 MG/ML
INJECTION, SOLUTION INTRAVENOUS AS NEEDED
Status: DISCONTINUED | OUTPATIENT
Start: 2021-08-31 | End: 2021-08-31 | Stop reason: SURG

## 2021-08-31 RX ORDER — GABAPENTIN 300 MG/1
300 CAPSULE ORAL
Status: COMPLETED | OUTPATIENT
Start: 2021-08-31 | End: 2021-08-31

## 2021-08-31 RX ORDER — SODIUM CHLORIDE 0.9 % (FLUSH) 0.9 %
3-10 SYRINGE (ML) INJECTION AS NEEDED
Status: DISCONTINUED | OUTPATIENT
Start: 2021-08-31 | End: 2021-08-31 | Stop reason: HOSPADM

## 2021-08-31 RX ORDER — ONDANSETRON 4 MG/1
4 TABLET, FILM COATED ORAL EVERY 6 HOURS PRN
Status: DISCONTINUED | OUTPATIENT
Start: 2021-08-31 | End: 2021-09-01 | Stop reason: HOSPADM

## 2021-08-31 RX ORDER — GABAPENTIN 250 MG/5ML
300 SOLUTION ORAL ONCE
Status: DISCONTINUED | OUTPATIENT
Start: 2021-08-31 | End: 2021-08-31

## 2021-08-31 RX ORDER — PROCHLORPERAZINE EDISYLATE 5 MG/ML
10 INJECTION INTRAMUSCULAR; INTRAVENOUS EVERY 6 HOURS PRN
Status: DISCONTINUED | OUTPATIENT
Start: 2021-08-31 | End: 2021-09-01 | Stop reason: HOSPADM

## 2021-08-31 RX ORDER — METOPROLOL SUCCINATE 25 MG/1
25 TABLET, EXTENDED RELEASE ORAL DAILY
Status: DISCONTINUED | OUTPATIENT
Start: 2021-09-01 | End: 2021-09-01 | Stop reason: HOSPADM

## 2021-08-31 RX ORDER — HYDROCODONE BITARTRATE AND ACETAMINOPHEN 5; 325 MG/1; MG/1
1 TABLET ORAL EVERY 4 HOURS PRN
Status: DISCONTINUED | OUTPATIENT
Start: 2021-08-31 | End: 2021-09-01 | Stop reason: HOSPADM

## 2021-08-31 RX ORDER — DEXAMETHASONE SODIUM PHOSPHATE 4 MG/ML
INJECTION, SOLUTION INTRA-ARTICULAR; INTRALESIONAL; INTRAMUSCULAR; INTRAVENOUS; SOFT TISSUE AS NEEDED
Status: DISCONTINUED | OUTPATIENT
Start: 2021-08-31 | End: 2021-08-31 | Stop reason: SURG

## 2021-08-31 RX ORDER — NALOXONE HCL 0.4 MG/ML
0.4 VIAL (ML) INJECTION
Status: DISCONTINUED | OUTPATIENT
Start: 2021-08-31 | End: 2021-09-01 | Stop reason: HOSPADM

## 2021-08-31 RX ORDER — CYANOCOBALAMIN 1000 UG/ML
1000 INJECTION, SOLUTION INTRAMUSCULAR; SUBCUTANEOUS ONCE
Status: COMPLETED | OUTPATIENT
Start: 2021-09-01 | End: 2021-09-01

## 2021-08-31 RX ORDER — BUPIVACAINE HYDROCHLORIDE 2.5 MG/ML
INJECTION, SOLUTION EPIDURAL; INFILTRATION; INTRACAUDAL AS NEEDED
Status: DISCONTINUED | OUTPATIENT
Start: 2021-08-31 | End: 2021-08-31 | Stop reason: HOSPADM

## 2021-08-31 RX ORDER — PROMETHAZINE HYDROCHLORIDE 25 MG/1
25 SUPPOSITORY RECTAL ONCE
Status: COMPLETED | OUTPATIENT
Start: 2021-08-31 | End: 2021-08-31

## 2021-08-31 RX ORDER — SODIUM CHLORIDE, SODIUM LACTATE, POTASSIUM CHLORIDE, CALCIUM CHLORIDE 600; 310; 30; 20 MG/100ML; MG/100ML; MG/100ML; MG/100ML
9 INJECTION, SOLUTION INTRAVENOUS CONTINUOUS PRN
Status: DISCONTINUED | OUTPATIENT
Start: 2021-08-31 | End: 2021-09-01 | Stop reason: HOSPADM

## 2021-08-31 RX ORDER — PROPOFOL 10 MG/ML
VIAL (ML) INTRAVENOUS AS NEEDED
Status: DISCONTINUED | OUTPATIENT
Start: 2021-08-31 | End: 2021-08-31

## 2021-08-31 RX ORDER — HYDROMORPHONE HCL 110MG/55ML
1 PATIENT CONTROLLED ANALGESIA SYRINGE INTRAVENOUS
Status: DISCONTINUED | OUTPATIENT
Start: 2021-08-31 | End: 2021-08-31 | Stop reason: HOSPADM

## 2021-08-31 RX ORDER — NALOXONE HCL 0.4 MG/ML
0.4 VIAL (ML) INJECTION AS NEEDED
Status: DISCONTINUED | OUTPATIENT
Start: 2021-08-31 | End: 2021-08-31 | Stop reason: HOSPADM

## 2021-08-31 RX ORDER — HYDROCODONE BITARTRATE AND ACETAMINOPHEN 10; 325 MG/1; MG/1
1 TABLET ORAL EVERY 4 HOURS PRN
Status: DISCONTINUED | OUTPATIENT
Start: 2021-08-31 | End: 2021-09-01 | Stop reason: HOSPADM

## 2021-08-31 RX ORDER — METOCLOPRAMIDE HYDROCHLORIDE 5 MG/ML
10 INJECTION INTRAMUSCULAR; INTRAVENOUS EVERY 6 HOURS PRN
Status: DISCONTINUED | OUTPATIENT
Start: 2021-08-31 | End: 2021-09-01 | Stop reason: HOSPADM

## 2021-08-31 RX ORDER — LABETALOL HYDROCHLORIDE 5 MG/ML
5 INJECTION, SOLUTION INTRAVENOUS
Status: DISCONTINUED | OUTPATIENT
Start: 2021-08-31 | End: 2021-08-31 | Stop reason: HOSPADM

## 2021-08-31 RX ORDER — VENLAFAXINE HYDROCHLORIDE 75 MG/1
75 CAPSULE, EXTENDED RELEASE ORAL DAILY
Status: DISCONTINUED | OUTPATIENT
Start: 2021-09-01 | End: 2021-09-01 | Stop reason: HOSPADM

## 2021-08-31 RX ORDER — PANTOPRAZOLE SODIUM 40 MG/10ML
40 INJECTION, POWDER, LYOPHILIZED, FOR SOLUTION INTRAVENOUS ONCE
Status: COMPLETED | OUTPATIENT
Start: 2021-08-31 | End: 2021-08-31

## 2021-08-31 RX ORDER — MIDAZOLAM HYDROCHLORIDE 1 MG/ML
INJECTION INTRAMUSCULAR; INTRAVENOUS AS NEEDED
Status: DISCONTINUED | OUTPATIENT
Start: 2021-08-31 | End: 2021-08-31 | Stop reason: SURG

## 2021-08-31 RX ORDER — MORPHINE SULFATE 4 MG/ML
2 INJECTION, SOLUTION INTRAMUSCULAR; INTRAVENOUS
Status: DISCONTINUED | OUTPATIENT
Start: 2021-08-31 | End: 2021-09-01 | Stop reason: HOSPADM

## 2021-08-31 RX ORDER — CEFAZOLIN SODIUM IN 0.9 % NACL 3 G/100 ML
3 INTRAVENOUS SOLUTION, PIGGYBACK (ML) INTRAVENOUS
Status: COMPLETED | OUTPATIENT
Start: 2021-08-31 | End: 2021-08-31

## 2021-08-31 RX ORDER — SODIUM CHLORIDE, SODIUM LACTATE, POTASSIUM CHLORIDE, CALCIUM CHLORIDE 600; 310; 30; 20 MG/100ML; MG/100ML; MG/100ML; MG/100ML
100 INJECTION, SOLUTION INTRAVENOUS CONTINUOUS
Status: DISCONTINUED | OUTPATIENT
Start: 2021-08-31 | End: 2021-09-01 | Stop reason: HOSPADM

## 2021-08-31 RX ORDER — CHLORHEXIDINE GLUCONATE 0.12 MG/ML
15 RINSE ORAL SEE ADMIN INSTRUCTIONS
Status: COMPLETED | OUTPATIENT
Start: 2021-08-31 | End: 2021-08-31

## 2021-08-31 RX ORDER — ONDANSETRON 2 MG/ML
INJECTION INTRAMUSCULAR; INTRAVENOUS AS NEEDED
Status: DISCONTINUED | OUTPATIENT
Start: 2021-08-31 | End: 2021-08-31 | Stop reason: SURG

## 2021-08-31 RX ORDER — FAMOTIDINE 10 MG/ML
20 INJECTION, SOLUTION INTRAVENOUS EVERY 12 HOURS SCHEDULED
Status: DISCONTINUED | OUTPATIENT
Start: 2021-08-31 | End: 2021-09-01 | Stop reason: HOSPADM

## 2021-08-31 RX ORDER — FENTANYL CITRATE 50 UG/ML
100 INJECTION, SOLUTION INTRAMUSCULAR; INTRAVENOUS
Status: DISCONTINUED | OUTPATIENT
Start: 2021-08-31 | End: 2021-08-31 | Stop reason: HOSPADM

## 2021-08-31 RX ORDER — SODIUM CHLORIDE 0.9 % (FLUSH) 0.9 %
10 SYRINGE (ML) INJECTION AS NEEDED
Status: DISCONTINUED | OUTPATIENT
Start: 2021-08-31 | End: 2021-08-31 | Stop reason: HOSPADM

## 2021-08-31 RX ORDER — PROPOFOL 10 MG/ML
VIAL (ML) INTRAVENOUS AS NEEDED
Status: DISCONTINUED | OUTPATIENT
Start: 2021-08-31 | End: 2021-08-31 | Stop reason: SURG

## 2021-08-31 RX ORDER — LABETALOL HYDROCHLORIDE 5 MG/ML
10 INJECTION, SOLUTION INTRAVENOUS
Status: DISCONTINUED | OUTPATIENT
Start: 2021-08-31 | End: 2021-09-01 | Stop reason: HOSPADM

## 2021-08-31 RX ADMIN — MIDAZOLAM 2 MG: 1 INJECTION INTRAMUSCULAR; INTRAVENOUS at 09:18

## 2021-08-31 RX ADMIN — LIDOCAINE HYDROCHLORIDE 50 MG: 20 INJECTION, SOLUTION EPIDURAL; INFILTRATION; INTRACAUDAL; PERINEURAL at 09:26

## 2021-08-31 RX ADMIN — PROMETHAZINE HYDROCHLORIDE 25 MG: 25 TABLET ORAL at 11:48

## 2021-08-31 RX ADMIN — PROPOFOL 150 MCG/KG/MIN: 10 INJECTION, EMULSION INTRAVENOUS at 09:31

## 2021-08-31 RX ADMIN — ONDANSETRON 4 MG: 2 INJECTION INTRAMUSCULAR; INTRAVENOUS at 17:05

## 2021-08-31 RX ADMIN — SODIUM CHLORIDE, POTASSIUM CHLORIDE, SODIUM LACTATE AND CALCIUM CHLORIDE 500 ML: 600; 310; 30; 20 INJECTION, SOLUTION INTRAVENOUS at 08:40

## 2021-08-31 RX ADMIN — ONDANSETRON 4 MG: 2 INJECTION INTRAMUSCULAR; INTRAVENOUS at 10:52

## 2021-08-31 RX ADMIN — ROCURONIUM BROMIDE 30 MG: 10 INJECTION INTRAVENOUS at 10:01

## 2021-08-31 RX ADMIN — FAMOTIDINE 20 MG: 10 INJECTION INTRAVENOUS at 20:14

## 2021-08-31 RX ADMIN — PANTOPRAZOLE SODIUM 40 MG: 40 INJECTION, POWDER, FOR SOLUTION INTRAVENOUS at 08:57

## 2021-08-31 RX ADMIN — SODIUM CHLORIDE, POTASSIUM CHLORIDE, SODIUM LACTATE AND CALCIUM CHLORIDE 9 ML/HR: 600; 310; 30; 20 INJECTION, SOLUTION INTRAVENOUS at 08:35

## 2021-08-31 RX ADMIN — MORPHINE SULFATE 2 MG: 4 INJECTION INTRAVENOUS at 20:15

## 2021-08-31 RX ADMIN — PROMETHAZINE HYDROCHLORIDE 12.5 MG: 12.5 TABLET ORAL at 08:56

## 2021-08-31 RX ADMIN — ONDANSETRON 4 MG: 2 INJECTION INTRAMUSCULAR; INTRAVENOUS at 11:35

## 2021-08-31 RX ADMIN — PROPOFOL 200 MG: 10 INJECTION, EMULSION INTRAVENOUS at 09:26

## 2021-08-31 RX ADMIN — ROCURONIUM BROMIDE 50 MG: 10 INJECTION INTRAVENOUS at 09:26

## 2021-08-31 RX ADMIN — SCOLOPAMINE TRANSDERMAL SYSTEM 1 PATCH: 1 PATCH, EXTENDED RELEASE TRANSDERMAL at 08:57

## 2021-08-31 RX ADMIN — CEFAZOLIN 3 G: 1 INJECTION, POWDER, FOR SOLUTION INTRAMUSCULAR; INTRAVENOUS; PARENTERAL at 09:31

## 2021-08-31 RX ADMIN — SODIUM CHLORIDE, POTASSIUM CHLORIDE, SODIUM LACTATE AND CALCIUM CHLORIDE 150 ML/HR: 600; 310; 30; 20 INJECTION, SOLUTION INTRAVENOUS at 12:42

## 2021-08-31 RX ADMIN — METOCLOPRAMIDE 10 MG: 5 INJECTION, SOLUTION INTRAMUSCULAR; INTRAVENOUS at 14:23

## 2021-08-31 RX ADMIN — Medication 10 ML: at 08:57

## 2021-08-31 RX ADMIN — HYOSCYAMINE SULFATE 125 MCG: 0.12 TABLET ORAL; SUBLINGUAL at 14:23

## 2021-08-31 RX ADMIN — SUGAMMADEX 200 MG: 100 INJECTION, SOLUTION INTRAVENOUS at 10:52

## 2021-08-31 RX ADMIN — SODIUM CHLORIDE, POTASSIUM CHLORIDE, SODIUM LACTATE AND CALCIUM CHLORIDE 150 ML/HR: 600; 310; 30; 20 INJECTION, SOLUTION INTRAVENOUS at 20:46

## 2021-08-31 RX ADMIN — HYDROMORPHONE HYDROCHLORIDE 1 MG: 2 INJECTION INTRAMUSCULAR; INTRAVENOUS; SUBCUTANEOUS at 10:59

## 2021-08-31 RX ADMIN — FENTANYL CITRATE 100 MCG: 50 INJECTION, SOLUTION INTRAMUSCULAR; INTRAVENOUS at 09:26

## 2021-08-31 RX ADMIN — METOCLOPRAMIDE 10 MG: 5 INJECTION, SOLUTION INTRAMUSCULAR; INTRAVENOUS at 08:57

## 2021-08-31 RX ADMIN — SODIUM CHLORIDE, SODIUM LACTATE, POTASSIUM CHLORIDE, AND CALCIUM CHLORIDE: .6; .31; .03; .02 INJECTION, SOLUTION INTRAVENOUS at 09:18

## 2021-08-31 RX ADMIN — PROCHLORPERAZINE EDISYLATE 10 MG: 5 INJECTION INTRAMUSCULAR; INTRAVENOUS at 20:15

## 2021-08-31 RX ADMIN — MORPHINE SULFATE 2 MG: 4 INJECTION INTRAVENOUS at 17:33

## 2021-08-31 RX ADMIN — DEXAMETHASONE SODIUM PHOSPHATE 8 MG: 4 INJECTION, SOLUTION INTRAMUSCULAR; INTRAVENOUS at 09:33

## 2021-08-31 RX ADMIN — FENTANYL CITRATE 50 MCG: 50 INJECTION, SOLUTION INTRAMUSCULAR; INTRAVENOUS at 11:17

## 2021-08-31 RX ADMIN — CHLORHEXIDINE GLUCONATE 15 ML: 1.2 SOLUTION ORAL at 08:56

## 2021-08-31 RX ADMIN — FENTANYL CITRATE 50 MCG: 50 INJECTION, SOLUTION INTRAMUSCULAR; INTRAVENOUS at 11:29

## 2021-08-31 RX ADMIN — GABAPENTIN 300 MG: 300 CAPSULE ORAL at 08:57

## 2021-08-31 RX ADMIN — ROCURONIUM BROMIDE 10 MG: 10 INJECTION INTRAVENOUS at 10:28

## 2021-08-31 RX ADMIN — HYDROMORPHONE HYDROCHLORIDE 1 MG: 2 INJECTION INTRAMUSCULAR; INTRAVENOUS; SUBCUTANEOUS at 10:53

## 2021-08-31 NOTE — ADDENDUM NOTE
Addendum  created 08/31/21 1317 by Zachery Garcia MD    Attestation recorded in Intraprocedure, Intraprocedure Attestations filed

## 2021-08-31 NOTE — ANESTHESIA PROCEDURE NOTES
Airway  Urgency: elective    Date/Time: 8/31/2021 9:29 AM  Airway not difficult    General Information and Staff    Patient location during procedure: OR  Anesthesiologist: Zachery Garcia MD  CRNA: Kristina Arango CRNA    Indications and Patient Condition  Indications for airway management: airway protection    Preoxygenated: yes  MILS maintained throughout  Mask difficulty assessment: 2 - vent by mask + OA or adjuvant +/- NMBA    Final Airway Details  Final airway type: endotracheal airway      Successful airway: ETT  Cuffed: yes   Successful intubation technique: video laryngoscopy  Facilitating devices/methods: intubating stylet  Endotracheal tube insertion site: oral  Blade: Sandoval  ETT size (mm): 7.0  Cormack-Lehane Classification: grade I - full view of glottis  Placement verified by: chest auscultation and capnometry   Number of attempts at approach: 1  Assessment: lips, teeth, and gum same as pre-op and atraumatic intubation

## 2021-08-31 NOTE — ANESTHESIA POSTPROCEDURE EVALUATION
Patient: Reema Schumacher    Procedure Summary     Date: 08/31/21 Room / Location: [facility] OR 08 / [facility] MAIN OR    Anesthesia Start: 0918 Anesthesia Stop: 1105    Procedure: GASTRIC SLEEVE LAPAROSCOPIC WITH DAVINCI ROBOT (N/A Abdomen) Diagnosis:       Body mass index (BMI) of 50-59.9 in adult (CMS/[facility])      (Body mass index (BMI) of 50-59.9 in adult (CMS/[facility]) [Z68.43])    Surgeons: Yesenia Chirinos MD Provider: Zachery Garcia MD    Anesthesia Type: general ASA Status: 3          Anesthesia Type: general    Vitals  Vitals Value Taken Time   /96 08/31/21 1210   Temp 97.4 °F (36.3 °C) 08/31/21 1208   Pulse 61 08/31/21 1210   Resp 17 08/31/21 1208   SpO2 94 % 08/31/21 1210   Vitals shown include unvalidated device data.        Post Anesthesia Care and Evaluation    Patient location during evaluation: PACU  Patient participation: complete - patient participated  Level of consciousness: awake  Pain scale: See nurse's notes for pain score.  Pain management: adequate  Airway patency: patent  Anesthetic complications: No anesthetic complications  PONV Status: none  Cardiovascular status: acceptable  Respiratory status: acceptable  Hydration status: acceptable    Comments: Patient seen and examined postoperatively; vital signs stable; SpO2 greater than or equal to 90%; cardiopulmonary status stable; nausea/vomiting adequately controlled; pain adequately controlled; no apparent anesthesia complications; patient discharged from anesthesia care when discharge criteria were met

## 2021-08-31 NOTE — ANESTHESIA PREPROCEDURE EVALUATION
Anesthesia Evaluation     history of anesthetic complications: PONV  NPO Solid Status: > 8 hours  NPO Liquid Status: > 8 hours           Airway   Mallampati: II  TM distance: >3 FB  Possible difficult intubation  Dental - normal exam     Pulmonary    Cardiovascular   Exercise tolerance: good (4-7 METS)    (+) hypertension,       Neuro/Psych  (+) psychiatric history,     GI/Hepatic/Renal/Endo    (+) morbid obesity, GERD,      Musculoskeletal     Abdominal    Substance History      OB/GYN    (-)  Pregnant        Other                      Anesthesia Plan    ASA 3     general     intravenous induction     Anesthetic plan, all risks, benefits, and alternatives have been provided, discussed and informed consent has been obtained with: patient.    Plan discussed with CAA.

## 2021-09-01 ENCOUNTER — READMISSION MANAGEMENT (OUTPATIENT)
Dept: CALL CENTER | Facility: HOSPITAL | Age: 26
End: 2021-09-01

## 2021-09-01 ENCOUNTER — PREP FOR SURGERY (OUTPATIENT)
Dept: OTHER | Facility: HOSPITAL | Age: 26
End: 2021-09-01

## 2021-09-01 VITALS
RESPIRATION RATE: 18 BRPM | TEMPERATURE: 98.1 F | OXYGEN SATURATION: 93 % | SYSTOLIC BLOOD PRESSURE: 157 MMHG | BODY MASS INDEX: 55.32 KG/M2 | HEIGHT: 61 IN | WEIGHT: 293 LBS | HEART RATE: 54 BPM | DIASTOLIC BLOOD PRESSURE: 81 MMHG

## 2021-09-01 LAB
ALBUMIN SERPL-MCNC: 4.2 G/DL (ref 3.5–5.2)
ALBUMIN/GLOB SERPL: 1.4 G/DL
ALP SERPL-CCNC: 60 U/L (ref 39–117)
ALT SERPL W P-5'-P-CCNC: 41 U/L (ref 1–33)
ANION GAP SERPL CALCULATED.3IONS-SCNC: 11 MMOL/L (ref 5–15)
AST SERPL-CCNC: 29 U/L (ref 1–32)
BASOPHILS # BLD AUTO: 0.1 10*3/MM3 (ref 0–0.2)
BASOPHILS NFR BLD AUTO: 0.5 % (ref 0–1.5)
BILIRUB SERPL-MCNC: 0.3 MG/DL (ref 0–1.2)
BUN SERPL-MCNC: 5 MG/DL (ref 6–20)
BUN/CREAT SERPL: 8.1 (ref 7–25)
CALCIUM SPEC-SCNC: 8.8 MG/DL (ref 8.6–10.5)
CHLORIDE SERPL-SCNC: 103 MMOL/L (ref 98–107)
CO2 SERPL-SCNC: 26 MMOL/L (ref 22–29)
CREAT SERPL-MCNC: 0.62 MG/DL (ref 0.57–1)
DEPRECATED RDW RBC AUTO: 40.3 FL (ref 37–54)
EOSINOPHIL # BLD AUTO: 0 10*3/MM3 (ref 0–0.4)
EOSINOPHIL NFR BLD AUTO: 0.1 % (ref 0.3–6.2)
ERYTHROCYTE [DISTWIDTH] IN BLOOD BY AUTOMATED COUNT: 12.9 % (ref 12.3–15.4)
GFR SERPL CREATININE-BSD FRML MDRD: 117 ML/MIN/1.73
GLOBULIN UR ELPH-MCNC: 3.1 GM/DL
GLUCOSE SERPL-MCNC: 105 MG/DL (ref 65–99)
HCT VFR BLD AUTO: 36.7 % (ref 34–46.6)
HGB BLD-MCNC: 12.7 G/DL (ref 12–15.9)
LAB AP CASE REPORT: NORMAL
LYMPHOCYTES # BLD AUTO: 1.8 10*3/MM3 (ref 0.7–3.1)
LYMPHOCYTES NFR BLD AUTO: 11.4 % (ref 19.6–45.3)
MAGNESIUM SERPL-MCNC: 1.9 MG/DL (ref 1.6–2.6)
MCH RBC QN AUTO: 30.5 PG (ref 26.6–33)
MCHC RBC AUTO-ENTMCNC: 34.6 G/DL (ref 31.5–35.7)
MCV RBC AUTO: 88.1 FL (ref 79–97)
MONOCYTES # BLD AUTO: 0.8 10*3/MM3 (ref 0.1–0.9)
MONOCYTES NFR BLD AUTO: 5.4 % (ref 5–12)
NEUTROPHILS NFR BLD AUTO: 13.1 10*3/MM3 (ref 1.7–7)
NEUTROPHILS NFR BLD AUTO: 82.6 % (ref 42.7–76)
NRBC BLD AUTO-RTO: 0.1 /100 WBC (ref 0–0.2)
PATH REPORT.FINAL DX SPEC: NORMAL
PATH REPORT.GROSS SPEC: NORMAL
PHOSPHATE SERPL-MCNC: 2.8 MG/DL (ref 2.5–4.5)
PLATELET # BLD AUTO: 311 10*3/MM3 (ref 140–450)
PMV BLD AUTO: 8.5 FL (ref 6–12)
POTASSIUM SERPL-SCNC: 4 MMOL/L (ref 3.5–5.2)
PROT SERPL-MCNC: 7.3 G/DL (ref 6–8.5)
RBC # BLD AUTO: 4.16 10*6/MM3 (ref 3.77–5.28)
SODIUM SERPL-SCNC: 140 MMOL/L (ref 136–145)
WBC # BLD AUTO: 15.8 10*3/MM3 (ref 3.4–10.8)

## 2021-09-01 PROCEDURE — 85025 COMPLETE CBC W/AUTO DIFF WBC: CPT | Performed by: SURGERY

## 2021-09-01 PROCEDURE — 80053 COMPREHEN METABOLIC PANEL: CPT | Performed by: SURGERY

## 2021-09-01 PROCEDURE — 25010000002 KETOROLAC TROMETHAMINE PER 15 MG: Performed by: SURGERY

## 2021-09-01 PROCEDURE — 25010000002 THIAMINE PER 100 MG: Performed by: SURGERY

## 2021-09-01 PROCEDURE — 84100 ASSAY OF PHOSPHORUS: CPT | Performed by: SURGERY

## 2021-09-01 PROCEDURE — 83735 ASSAY OF MAGNESIUM: CPT | Performed by: SURGERY

## 2021-09-01 PROCEDURE — 25010000002 ENOXAPARIN PER 10 MG: Performed by: SURGERY

## 2021-09-01 PROCEDURE — 99024 POSTOP FOLLOW-UP VISIT: CPT | Performed by: SURGERY

## 2021-09-01 PROCEDURE — 25010000002 CYANOCOBALAMIN PER 1000 MCG: Performed by: SURGERY

## 2021-09-01 PROCEDURE — 25010000002 ONDANSETRON PER 1 MG: Performed by: SURGERY

## 2021-09-01 RX ORDER — HYDROCODONE BITARTRATE AND ACETAMINOPHEN 5; 325 MG/1; MG/1
1 TABLET ORAL EVERY 4 HOURS PRN
Qty: 10 TABLET | Refills: 0 | Status: SHIPPED | OUTPATIENT
Start: 2021-09-01 | End: 2022-09-12

## 2021-09-01 RX ORDER — KETOROLAC TROMETHAMINE 30 MG/ML
30 INJECTION, SOLUTION INTRAMUSCULAR; INTRAVENOUS EVERY 6 HOURS
Status: DISCONTINUED | OUTPATIENT
Start: 2021-09-01 | End: 2021-09-01 | Stop reason: HOSPADM

## 2021-09-01 RX ORDER — ONDANSETRON 8 MG/1
8 TABLET, ORALLY DISINTEGRATING ORAL EVERY 8 HOURS PRN
Qty: 30 TABLET | Refills: 5 | Status: SHIPPED | OUTPATIENT
Start: 2021-09-01 | End: 2021-11-29

## 2021-09-01 RX ADMIN — HYDROCODONE BITARTRATE AND ACETAMINOPHEN 1 TABLET: 5; 325 TABLET ORAL at 03:00

## 2021-09-01 RX ADMIN — CYANOCOBALAMIN 1000 MCG: 1000 INJECTION, SOLUTION INTRAMUSCULAR at 08:20

## 2021-09-01 RX ADMIN — THIAMINE HYDROCHLORIDE 100 ML/HR: 100 INJECTION, SOLUTION INTRAMUSCULAR; INTRAVENOUS at 04:10

## 2021-09-01 RX ADMIN — METOPROLOL SUCCINATE 25 MG: 25 TABLET, EXTENDED RELEASE ORAL at 09:51

## 2021-09-01 RX ADMIN — ENOXAPARIN SODIUM 40 MG: 40 INJECTION SUBCUTANEOUS at 08:20

## 2021-09-01 RX ADMIN — PANTOPRAZOLE SODIUM 40 MG: 40 TABLET, DELAYED RELEASE ORAL at 09:51

## 2021-09-01 RX ADMIN — LABETALOL 20 MG/4 ML (5 MG/ML) INTRAVENOUS SYRINGE 10 MG: at 03:11

## 2021-09-01 RX ADMIN — KETOROLAC TROMETHAMINE 30 MG: 30 INJECTION, SOLUTION INTRAMUSCULAR; INTRAVENOUS at 14:04

## 2021-09-01 RX ADMIN — KETOROLAC TROMETHAMINE 30 MG: 30 INJECTION, SOLUTION INTRAMUSCULAR; INTRAVENOUS at 08:20

## 2021-09-01 RX ADMIN — FAMOTIDINE 20 MG: 10 INJECTION INTRAVENOUS at 08:20

## 2021-09-01 RX ADMIN — HYOSCYAMINE SULFATE 125 MCG: 0.12 TABLET ORAL; SUBLINGUAL at 09:57

## 2021-09-01 RX ADMIN — ONDANSETRON 4 MG: 2 INJECTION INTRAMUSCULAR; INTRAVENOUS at 09:22

## 2021-09-01 RX ADMIN — ONDANSETRON 4 MG: 2 INJECTION INTRAMUSCULAR; INTRAVENOUS at 03:11

## 2021-09-01 RX ADMIN — VENLAFAXINE HYDROCHLORIDE 75 MG: 75 CAPSULE, EXTENDED RELEASE ORAL at 09:51

## 2021-09-01 RX ADMIN — SODIUM CHLORIDE, POTASSIUM CHLORIDE, SODIUM LACTATE AND CALCIUM CHLORIDE 100 ML/HR: 600; 310; 30; 20 INJECTION, SOLUTION INTRAVENOUS at 03:17

## 2021-09-01 NOTE — OUTREACH NOTE
Prep Survey      Responses   [facility] patient discharged from?  [facility]   Is LACE score < 7 ?  Yes   Emergency Room discharge w/ pulse ox?  No   Eligibility  [facility]   Date of Admission  08/31/21   Date of Discharge  09/01/21   Discharge Disposition  Home or Self Care   Discharge diagnosis  Laparoscopic sleeve gastrectomy   Does the patient have one of the following disease processes/diagnoses(primary or secondary)?  General Surgery   Does the patient have Home health ordered?  No   Is there a DME ordered?  No   Prep survey completed?  Yes          Marleni Ramírez RN

## 2021-09-01 NOTE — PLAN OF CARE
Goal Outcome Evaluation:      Pain controlled with medication. Experienced nausea/vomiting early in shift, but responded well to med interventions and gained relief. Patient has been chewing her gum on schedule and ambulating with stand beside assistance throughout the unit more often then q4h to relieve gas discomfort. Urinating appropriately. Has been using SCD's intermittently while in bed and using IS independently q1h.

## 2021-09-01 NOTE — DISCHARGE SUMMARY
"Discharge Summary    Patient name: Reema Schumacher    Medical record number: [id number]    Admission date: 8/31/2021  Discharge date:      Attending physician: Dr. Yesenia Chirinos    Primary care physician: Akshat Jones MD    Referring physician: Yesenia Chirinos MD  [address] [place] [address]    Condition on discharge: Stable    Primary Diagnoses:  Morbid obesity with co-morbidities    Operative Procedure: Laparoscopic sleeve gastrectomy     Reema Schumacher  is post op day one status post procedure listed. Patient denies shortness of air and lower extremity pain. Feels better than yesterday. No vomiting this am. Ambulating well and using incentive spirometer.          /84 (BP Location: Left arm, Patient Position: Sitting)   Pulse 78   Temp 98.3 °F (36.8 °C) (Oral)   Resp 20   Ht 154.9 cm (61\")   Wt 133 kg (293 lb 3.4 oz)   LMP 08/29/2021 (Approximate)   SpO2 96%   BMI 55.40 kg/m²     General:  alert, appears stated age and cooperative   Abdomen: soft, bowel sounds active, appropriate tenderness   Incision:   healing well, no drainage, no erythema, no hernia, no seroma, no swelling, no dehiscence, incision well approximated   Heart: Regular rate   Lungs: Clear to auscultation bilaterally     I reviewed the patient's new clinical results.     Lab Results (last 24 hours)     Procedure Component Value Units Date/Time    Comprehensive Metabolic Panel [[id number]]  (Abnormal) Collected: 09/01/21 0535    Specimen: Blood Updated: 09/01/21 0642     Glucose 105 mg/dL      BUN 5 mg/dL      Creatinine 0.62 mg/dL      Sodium 140 mmol/L      Potassium 4.0 mmol/L      Chloride 103 mmol/L      CO2 26.0 mmol/L      Calcium 8.8 mg/dL      Total Protein 7.3 g/dL      Albumin 4.20 g/dL      ALT (SGPT) 41 U/L      AST (SGOT) 29 U/L      Alkaline Phosphatase 60 U/L      Total Bilirubin 0.3 mg/dL      eGFR Non African Amer 117 mL/min/1.73      Globulin 3.1 gm/dL      A/G Ratio 1.4 g/dL      " BUN/Creatinine Ratio 8.1     Anion Gap 11.0 mmol/L     Narrative:      GFR Normal >60  Chronic Kidney Disease <60  Kidney Failure <15      Phosphorus [[id number]]  (Normal) Collected: 09/01/21 0535    Specimen: Blood Updated: 09/01/21 0642     Phosphorus 2.8 mg/dL     Magnesium [[id number]]  (Normal) Collected: 09/01/21 0535    Specimen: Blood Updated: 09/01/21 0642     Magnesium 1.9 mg/dL     CBC & Differential [[id number]]  (Abnormal) Collected: 09/01/21 0535    Specimen: Blood Updated: 09/01/21 0620    Narrative:      The following orders were created for panel order CBC & Differential.  Procedure                               Abnormality         Status                     ---------                               -----------         ------                     CBC Auto Differential[[id number]]        Abnormal            Final result                 Please view results for these tests on the individual orders.    CBC Auto Differential [[id number]]  (Abnormal) Collected: 09/01/21 0535    Specimen: Blood Updated: 09/01/21 0620     WBC 15.80 10*3/mm3      RBC 4.16 10*6/mm3      Hemoglobin 12.7 g/dL      Hematocrit 36.7 %      MCV 88.1 fL      MCH 30.5 pg      MCHC 34.6 g/dL      RDW 12.9 %      RDW-SD 40.3 fl      MPV 8.5 fL      Platelets 311 10*3/mm3      Neutrophil % 82.6 %      Lymphocyte % 11.4 %      Monocyte % 5.4 %      Eosinophil % 0.1 %      Basophil % 0.5 %      Neutrophils, Absolute 13.10 10*3/mm3      Lymphocytes, Absolute 1.80 10*3/mm3      Monocytes, Absolute 0.80 10*3/mm3      Eosinophils, Absolute 0.00 10*3/mm3      Basophils, Absolute 0.10 10*3/mm3      nRBC 0.1 /100 WBC              Assessment:      Doing well postoperatively.      Plan:   1. Continue Stage 1 diet  2. Continue with ambulation and Incentive spirometry  3. Plan for d/c home      Hospital Course: The patient is a very pleasant 26 y.o. female that was admitted to the hospital with with morbid obesity underwent a laparoscopic gastric sleeve.   The next morning the patient was able to tolerate liquids and was ambulating and vital signs and labs were stable.  The patient is discharged home with follow-up in my office next week.      Discharge medications:      Discharge Medications      New Medications      Instructions Start Date   enoxaparin 40 MG/0.4ML solution syringe  Commonly known as: Lovenox   40 mg, Subcutaneous, Every 12 Hours Scheduled      HYDROcodone-acetaminophen 5-325 MG per tablet  Commonly known as: NORCO   1 tablet, Oral, Every 4 Hours PRN      ondansetron ODT 8 MG disintegrating tablet  Commonly known as: Zofran ODT   8 mg, Translingual, Every 8 Hours PRN         Changes to Medications      Instructions Start Date   lisdexamfetamine 40 MG capsule  Commonly known as: Vyvanse  What changed: additional instructions   40 mg, Oral, Every Morning      metoprolol succinate XL 25 MG 24 hr tablet  Commonly known as: TOPROL-XL  What changed: additional instructions   25 mg, Oral, Daily      sucralfate 1 g tablet  Commonly known as: Carafate  What changed: additional instructions   1 g, Oral, 4 Times Daily      venlafaxine XR 75 MG 24 hr capsule  Commonly known as: EFFEXOR-XR  What changed: additional instructions   75 mg, Oral, Daily      vitamin D 1.25 MG (35963 UT) capsule capsule  Commonly known as: ERGOCALCIFEROL  What changed: additional instructions   50,000 Units, Oral, Every 7 Days         Continue These Medications      Instructions Start Date   hydrOXYzine 10 MG tablet  Commonly known as: ATARAX   10 mg, Oral, Nightly PRN      omeprazole 40 MG capsule  Commonly known as: priLOSEC   40 mg, Oral, Daily, Wont take preop      pantoprazole 40 MG EC tablet  Commonly known as: PROTONIX   40 mg, Oral, Daily, Wont take preop              Discharge instructions:  Per Bariatric manual; per our protocol      Follow-up appointment: Follow up with Dr. Chirinos in the office as scheduled.  If not already scheduled call for appointment at [phone]

## 2021-09-02 ENCOUNTER — TRANSITIONAL CARE MANAGEMENT TELEPHONE ENCOUNTER (OUTPATIENT)
Dept: CALL CENTER | Facility: HOSPITAL | Age: 26
End: 2021-09-02

## 2021-09-02 NOTE — OUTREACH NOTE
Call Center TCM Note      Responses   [facility] patient discharged from?  [facility]   Does the patient have one of the following disease processes/diagnoses(primary or secondary)?  General Surgery   TCM attempt successful?  No   Unsuccessful attempts  Attempt 2          Mer Connelly MA    9/2/2021, 16:00 EDT

## 2021-09-02 NOTE — PAYOR COMM NOTE
"NOTIFICATION OF DISCHARGE:            Reema Schumacher (26 y.o. Female)  1995  AUTH # [id number]      DISCHARGED TO HOME ON 09/01/2021.            Sonya Meraz RN MSN  /UR  [facility]  [phone] office  [phone] fax  [email or web address]    [facility]  NPI: [id number]  Tax: [id number]          Reema Schumacher (26 y.o. Female)     Date of Birth Social Security Number Address Home Phone MRN    1995  [address] [address] [phone] [id number]    Christian Marital Status          None Single       Admission Date Admission Type Admitting Provider Attending Provider Department, Room/Bed    8/31/21 Elective Yesenia Chirinos MD  [facility] MOTHER BABY, M415/1    Discharge Date Discharge Disposition Discharge Destination        9/1/2021 Home or Self Care              Attending Provider: (none)   Allergies: No Known Allergies    Isolation: None   Infection: None   Code Status: Prior    Ht: 154.9 cm (61\")   Wt: 133 kg (293 lb 3.4 oz)    Admission Cmt: None   Principal Problem: BMI 50.0-59.9, adult (CMS/HCC) [Z68.43]                 Active Insurance as of 8/31/2021     Primary Coverage     Payor Plan Insurance Group Employer/Plan Group    ANTHEM BLUE CROSS ANTHEM BLUE CROSS BLUE SHIELD PPO [id number]     Payor Plan Address Payor Plan Phone Number Payor Plan Fax Number Effective Dates    [address] [phone]  1/1/2018 - None Entered    [facility] [id number]       Subscriber Name Subscriber Birth Date Member ID       JOAN RGESHAM [birth year]1962 [id number]           Secondary Coverage     Payor Plan Insurance Group Employer/Plan Group    [place] [id number]     Payor Plan Address Payor Plan Phone Number Payor Plan Fax Number Effective Dates    MAIL STOP:   12/1/2020 - None Entered    [address] [address]       [facility] [address]       Subscriber Name Subscriber Birth Date Member ID       " "REEMA SCHUMACHER 1995 [id number]                 Emergency Contacts      (Rel.) Home Phone Work Phone Mobile Phone    REJI PHILLIPS (Significant Other) [phone] -- [phone]               Discharge Summary      Yesenia Chirinos MD at 09/01/21 1205          Discharge Summary    Patient name: Reema Schumacher    Medical record number: [id number]    Admission date: 8/31/2021  Discharge date:      Attending physician: Dr. Yesenia Chirinos    Primary care physician: Akshat Jones MD    Referring physician: Yesenia Chirinos MD  [facility] [address]  [place] [address]    Condition on discharge: Stable    Primary Diagnoses:  Morbid obesity with co-morbidities    Operative Procedure: Laparoscopic sleeve gastrectomy     Reema Schumacher  is post op day one status post procedure listed. Patient denies shortness of air and lower extremity pain. Feels better than yesterday. No vomiting this am. Ambulating well and using incentive spirometer.          /84 (BP Location: Left arm, Patient Position: Sitting)   Pulse 78   Temp 98.3 °F (36.8 °C) (Oral)   Resp 20   Ht 154.9 cm (61\")   Wt 133 kg (293 lb 3.4 oz)   LMP 08/29/2021 (Approximate)   SpO2 96%   BMI 55.40 kg/m²     General:  alert, appears stated age and cooperative   Abdomen: soft, bowel sounds active, appropriate tenderness   Incision:   healing well, no drainage, no erythema, no hernia, no seroma, no swelling, no dehiscence, incision well approximated   Heart: Regular rate   Lungs: Clear to auscultation bilaterally     I reviewed the patient's new clinical results.     Lab Results (last 24 hours)     Procedure Component Value Units Date/Time    Comprehensive Metabolic Panel [[id number]]  (Abnormal) Collected: 09/01/21 0535    Specimen: Blood Updated: 09/01/21 0642     Glucose 105 mg/dL      BUN 5 mg/dL      Creatinine 0.62 mg/dL      Sodium 140 mmol/L      Potassium 4.0 mmol/L      Chloride 103 mmol/L      CO2 26.0 mmol/L      Calcium " 8.8 mg/dL      Total Protein 7.3 g/dL      Albumin 4.20 g/dL      ALT (SGPT) 41 U/L      AST (SGOT) 29 U/L      Alkaline Phosphatase 60 U/L      Total Bilirubin 0.3 mg/dL      eGFR Non African Amer 117 mL/min/1.73      Globulin 3.1 gm/dL      A/G Ratio 1.4 g/dL      BUN/Creatinine Ratio 8.1     Anion Gap 11.0 mmol/L     Narrative:      GFR Normal >60  Chronic Kidney Disease <60  Kidney Failure <15      Phosphorus [[id number]]  (Normal) Collected: 09/01/21 0535    Specimen: Blood Updated: 09/01/21 0642     Phosphorus 2.8 mg/dL     Magnesium [[id number]]  (Normal) Collected: 09/01/21 0535    Specimen: Blood Updated: 09/01/21 0642     Magnesium 1.9 mg/dL     CBC & Differential [[id number]]  (Abnormal) Collected: 09/01/21 0535    Specimen: Blood Updated: 09/01/21 0620    Narrative:      The following orders were created for panel order CBC & Differential.  Procedure                               Abnormality         Status                     ---------                               -----------         ------                     CBC Auto Differential[[id number]]        Abnormal            Final result                 Please view results for these tests on the individual orders.    CBC Auto Differential [[id number]]  (Abnormal) Collected: 09/01/21 0535    Specimen: Blood Updated: 09/01/21 0620     WBC 15.80 10*3/mm3      RBC 4.16 10*6/mm3      Hemoglobin 12.7 g/dL      Hematocrit 36.7 %      MCV 88.1 fL      MCH 30.5 pg      MCHC 34.6 g/dL      RDW 12.9 %      RDW-SD 40.3 fl      MPV 8.5 fL      Platelets 311 10*3/mm3      Neutrophil % 82.6 %      Lymphocyte % 11.4 %      Monocyte % 5.4 %      Eosinophil % 0.1 %      Basophil % 0.5 %      Neutrophils, Absolute 13.10 10*3/mm3      Lymphocytes, Absolute 1.80 10*3/mm3      Monocytes, Absolute 0.80 10*3/mm3      Eosinophils, Absolute 0.00 10*3/mm3      Basophils, Absolute 0.10 10*3/mm3      nRBC 0.1 /100 WBC              Assessment:      Doing well postoperatively.      Plan:   1.  Continue Stage 1 diet  2. Continue with ambulation and Incentive spirometry  3. Plan for d/c home      Hospital Course: The patient is a very pleasant 26 y.o. female that was admitted to the hospital with with morbid obesity underwent a laparoscopic gastric sleeve.  The next morning the patient was able to tolerate liquids and was ambulating and vital signs and labs were stable.  The patient is discharged home with follow-up in my office next week.      Discharge medications:      Discharge Medications      New Medications      Instructions Start Date   enoxaparin 40 MG/0.4ML solution syringe  Commonly known as: Lovenox   40 mg, Subcutaneous, Every 12 Hours Scheduled      HYDROcodone-acetaminophen 5-325 MG per tablet  Commonly known as: NORCO   1 tablet, Oral, Every 4 Hours PRN      ondansetron ODT 8 MG disintegrating tablet  Commonly known as: Zofran ODT   8 mg, Translingual, Every 8 Hours PRN         Changes to Medications      Instructions Start Date   lisdexamfetamine 40 MG capsule  Commonly known as: Vyvanse  What changed: additional instructions   40 mg, Oral, Every Morning      metoprolol succinate XL 25 MG 24 hr tablet  Commonly known as: TOPROL-XL  What changed: additional instructions   25 mg, Oral, Daily      sucralfate 1 g tablet  Commonly known as: Carafate  What changed: additional instructions   1 g, Oral, 4 Times Daily      venlafaxine XR 75 MG 24 hr capsule  Commonly known as: EFFEXOR-XR  What changed: additional instructions   75 mg, Oral, Daily      vitamin D 1.25 MG (52893 UT) capsule capsule  Commonly known as: ERGOCALCIFEROL  What changed: additional instructions   50,000 Units, Oral, Every 7 Days         Continue These Medications      Instructions Start Date   hydrOXYzine 10 MG tablet  Commonly known as: ATARAX   10 mg, Oral, Nightly PRN      omeprazole 40 MG capsule  Commonly known as: priLOSEC   40 mg, Oral, Daily, Wont take preop      pantoprazole 40 MG EC tablet  Commonly known as:  PROTONIX   40 mg, Oral, Daily, Wont take preop              Discharge instructions:  Per Bariatric manual; per our protocol      Follow-up appointment: Follow up with Dr. Chirinos in the office as scheduled.  If not already scheduled call for appointment at [phone]    Electronically signed by Harsh Chirinos MD at 09/01/21 1209       Discharge Order (From admission, onward)     Start     Ordered    09/01/21 1204  Discharge patient  Once     Expected Discharge Date: 09/01/21    Discharge Disposition: Home or Self Care    Physician of Record for Attribution - Please select from Treatment Team: HARSH CHIRINOS [[id number]]    Review needed by CMO to determine Physician of Record: No    Please choose which facility the patient is currently admitted if they are being discharged to another facility or unit.: [facility]       Question Answer Comment   Physician of Record for Attribution - Please select from Treatment Team HARSH CHIRINOS    Review needed by CMO to determine Physician of Record No    Please choose which facility the patient is currently admitted if they are being discharged to another facility or unit. [facility]        09/01/21 8301

## 2021-09-02 NOTE — OUTREACH NOTE
Call Center TCM Note      Responses   [facility] hospitals in [place] patient discharged from?  [facility]   Does the patient have one of the following disease processes/diagnoses(primary or secondary)?  General Surgery   TCM attempt successful?  No   Unsuccessful attempts  Attempt 1          Mer Connelly MA    9/2/2021, 14:49 EDT

## 2021-09-02 NOTE — PROGRESS NOTES
Case Management Discharge Note                Selected Continued Care - Discharged on 9/1/2021 Admission date: 8/31/2021 - Discharge disposition: Home or Self Care    Destination    No services have been selected for the patient.              Durable Medical Equipment    No services have been selected for the patient.              Dialysis/Infusion    No services have been selected for the patient.              Home Medical Care    No services have been selected for the patient.              Therapy    No services have been selected for the patient.              Community Resources    No services have been selected for the patient.              Community & DME    No services have been selected for the patient.                       Final Discharge Disposition Code: 01 - home or self-care

## 2021-09-03 ENCOUNTER — TRANSITIONAL CARE MANAGEMENT TELEPHONE ENCOUNTER (OUTPATIENT)
Dept: CALL CENTER | Facility: HOSPITAL | Age: 26
End: 2021-09-03

## 2021-09-03 NOTE — OUTREACH NOTE
Call Center TCM Note      Responses   [facility] patient discharged from?  [facility]   Does the patient have one of the following disease processes/diagnoses(primary or secondary)?  General Surgery   TCM attempt successful?  No   Unsuccessful attempts  Attempt 3          Mei Gomez RN    9/3/2021, 11:59 EDT

## 2021-09-07 ENCOUNTER — OFFICE VISIT (OUTPATIENT)
Dept: CARDIOLOGY | Facility: CLINIC | Age: 26
End: 2021-09-07

## 2021-09-07 VITALS
OXYGEN SATURATION: 96 % | BODY MASS INDEX: 53.43 KG/M2 | WEIGHT: 283 LBS | RESPIRATION RATE: 16 BRPM | SYSTOLIC BLOOD PRESSURE: 136 MMHG | DIASTOLIC BLOOD PRESSURE: 85 MMHG | HEART RATE: 120 BPM | HEIGHT: 61 IN

## 2021-09-07 DIAGNOSIS — Z01.810 PREOP CARDIOVASCULAR EXAM: Primary | ICD-10-CM

## 2021-09-07 DIAGNOSIS — I10 ESSENTIAL HYPERTENSION: ICD-10-CM

## 2021-09-07 DIAGNOSIS — E66.01 MORBID OBESITY WITH BMI OF 50.0-59.9, ADULT (HCC): ICD-10-CM

## 2021-09-07 PROCEDURE — 99214 OFFICE O/P EST MOD 30 MIN: CPT | Performed by: INTERNAL MEDICINE

## 2021-09-07 RX ORDER — SUCRALFATE 1 G/1
TABLET ORAL
Qty: 120 TABLET | Refills: 1 | Status: SHIPPED | OUTPATIENT
Start: 2021-09-07 | End: 2021-11-04

## 2021-09-07 NOTE — PROGRESS NOTES
Encounter Date:2021  Last seen 2021      Patient ID: Reema Schumacher is a 26 y.o. female.    Chief Complaint:  Obesity  Status post gastric sleeve surgery  Elevated blood pressure     History of Present Illness  Patient had gastric sleeve surgery since last visit.  Patient was advised to take regular metoprolol rather than extended release due to recent gastric sleeve surgery  Patient is on Lovenox (postop)    Since I have last seen, the patient has been without any chest discomfort ,shortness of breath, palpitations, dizziness or syncope.  Denies having any headache ,abdominal pain ,nausea, vomiting , diarrhea constipation, loss of weight or loss of appetite.  Denies having any excessive bruising ,hematuria or blood in the stool.    Review of all systems negative except as indicated.    Reviewed ROS.  Assessment and Plan      ]]]]]]]]]]]]]]]  Impression  =======   -Essential hypertension.  ADHD GERD  Echocardiogram-normal 2021    -Exogenous obesity (BMI 59)  BMI has come down to 53.5-2021  Status post gastric sleeve surgery     -History of      -No known allergies.     -Family history of coronary artery disease     -Former smoker  =========  Plan  ========  Exogenous obesity.  Status post gastric sleeve surgery.    Hypertension-better controlled 136/85.  Patient was changed from metoprolol XL to metoprolol tartrate 25 mg twice a day.  Patient was advised to take tartrate rather than succinate due to gastric sleeve surgery.  Patient was provided with metoprolol tartrate prescription.    Medications were reviewed and updated.    Follow-up in the office in 6 months.  Further plan will depend on patient's progress.  ]]]]]]]]]]]]]]]]                               Diagnosis Plan   1. Preop cardiovascular exam     2. Essential hypertension     3. Morbid obesity with BMI of 50.0-59.9, adult (CMS/[facility])     LAB RESULTS (LAST 7 DAYS)    CBC  Results from last 7 days   Lab Units 21  0535   WBC  10*3/mm3 15.80*   RBC 10*6/mm3 4.16   HEMOGLOBIN g/dL 12.7   HEMATOCRIT % 36.7   MCV fL 88.1   PLATELETS 10*3/mm3 311       BMP  Results from last 7 days   Lab Units 09/01/21  0535   SODIUM mmol/L 140   POTASSIUM mmol/L 4.0   CHLORIDE mmol/L 103   CO2 mmol/L 26.0   BUN mg/dL 5*   CREATININE mg/dL 0.62   GLUCOSE mg/dL 105*   MAGNESIUM mg/dL 1.9   PHOSPHORUS mg/dL 2.8       CMP   Results from last 7 days   Lab Units 09/01/21  0535   SODIUM mmol/L 140   POTASSIUM mmol/L 4.0   CHLORIDE mmol/L 103   CO2 mmol/L 26.0   BUN mg/dL 5*   CREATININE mg/dL 0.62   GLUCOSE mg/dL 105*   ALBUMIN g/dL 4.20   BILIRUBIN mg/dL 0.3   ALK PHOS U/L 60   AST (SGOT) U/L 29   ALT (SGPT) U/L 41*         BNP        TROPONIN        CoAg        Creatinine Clearance  Estimated Creatinine Clearance: 175 mL/min (by C-G formula based on SCr of 0.62 mg/dL).    ABG        Radiology  No radiology results for the last day                The following portions of the patient's history were reviewed and updated as appropriate: allergies, current medications, past family history, past medical history, past social history, past surgical history and problem list.    ROS      Current Outpatient Medications:   •  enoxaparin (Lovenox) 40 MG/0.4ML solution syringe, Inject 0.4 mL under the skin into the appropriate area as directed Every 12 (Twelve) Hours for 14 days., Disp: 11.2 mL, Rfl: 0  •  lisdexamfetamine (Vyvanse) 40 MG capsule, Take 1 capsule by mouth Every Morning (Patient taking differently: Take 40 mg by mouth Every Morning Wont take preop ), Disp: 30 capsule, Rfl: 0  •  metoprolol succinate XL (TOPROL-XL) 25 MG 24 hr tablet, Take 1 tablet by mouth Daily. (Patient taking differently: Take 25 mg by mouth Daily. Take preop), Disp: 90 tablet, Rfl: 1  •  pantoprazole (PROTONIX) 40 MG EC tablet, Take 40 mg by mouth Daily. Wont take preop, Disp: , Rfl:   •  sucralfate (CARAFATE) 1 g tablet, TAKE ONE TABLET BY MOUTH FOUR TIMES A DAY, Disp: 120 tablet, Rfl:  1  •  venlafaxine XR (EFFEXOR-XR) 75 MG 24 hr capsule, Take 1 capsule by mouth Daily. (Patient taking differently: Take 75 mg by mouth Daily. dont take preop), Disp: 90 capsule, Rfl: 1  •  vitamin D (ERGOCALCIFEROL) 1.25 MG (30683 UT) capsule capsule, Take 1 capsule by mouth Every 7 (Seven) Days. (Patient taking differently: Take 50,000 Units by mouth Every 7 (Seven) Days. friday), Disp: 12 capsule, Rfl: 0  •  HYDROcodone-acetaminophen (NORCO) 5-325 MG per tablet, Take 1 tablet by mouth Every 4 (Four) Hours As Needed for Moderate Pain ., Disp: 10 tablet, Rfl: 0  •  hydrOXYzine (ATARAX) 10 MG tablet, Take 10 mg by mouth At Night As Needed for Anxiety (sleep)., Disp: , Rfl:   •  omeprazole (priLOSEC) 40 MG capsule, Take 40 mg by mouth Daily. Wont take preop, Disp: , Rfl:   •  ondansetron ODT (Zofran ODT) 8 MG disintegrating tablet, Place 1 tablet on the tongue Every 8 (Eight) Hours As Needed for Nausea or Vomiting., Disp: 30 tablet, Rfl: 5    No Known Allergies    Family History   Problem Relation Age of Onset   • Hypertension Mother    • Obesity Mother    • Heart disease Mother    • Crohn's disease Mother    • Heart attack Father    • Stroke Father    • Diabetes Father        Past Surgical History:   Procedure Laterality Date   •  SECTION     • ENDOSCOPY N/A 2020    Procedure: ESOPHAGOGASTRODUODENOSCOPY WITH BIOPSY X2 AREAS;  Surgeon: Kenney Hodgson MD;  Location: [facility] ENDOSCOPY;  Service: Gastroenterology;  Laterality: N/A;  duodenitis, duodenal ulcers, gastric ulcers   • ENDOSCOPY N/A 2021    Procedure: ESOPHAGOGASTRODUODENOSCOPY WITH BIOPSY X2 AREAS;  Surgeon: Kenney Hodgson MD;  Location: [facility] ENDOSCOPY;  Service: Gastroenterology;  Laterality: N/A;  duodenitis and gastric ulcers, ESOPHAGITIS   • GASTRIC SLEEVE LAPAROSCOPIC N/A 2021    Procedure: GASTRIC SLEEVE LAPAROSCOPIC WITH DAVINCI ROBOT;  Surgeon: Yesenia Chirinos MD;  Location: [facility] MAIN OR;  Service: Robotics - DaVinci;   "Laterality: N/A;   • PLANTAR'S WART EXCISION Bilateral    • WISDOM TOOTH EXTRACTION         Past Medical History:   Diagnosis Date   • ADHD    • Depression with anxiety    • Family history of diabetes mellitus (DM)    • Family history of heart disease    • GERD (gastroesophageal reflux disease)    • Hypertension    • Obesity    • PONV (postoperative nausea and vomiting)    • Vitamin D deficiency        Family History   Problem Relation Age of Onset   • Hypertension Mother    • Obesity Mother    • Heart disease Mother    • Crohn's disease Mother    • Heart attack Father    • Stroke Father    • Diabetes Father        Social History     Socioeconomic History   • Marital status: Single     Spouse name: Not on file   • Number of children: Not on file   • Years of education: Not on file   • Highest education level: Not on file   Tobacco Use   • Smoking status: Former Smoker     Packs/day: 0.50     Years: 6.00     Pack years: 3.00     Types: Cigarettes, Electronic Cigarette     Start date: 2012     Quit date: 2018     Years since quitting: 3.6   • Smokeless tobacco: Never Used   Vaping Use   • Vaping Use: Former   • Quit date: 5/21/2021   • Substances: CBD   Substance and Sexual Activity   • Alcohol use: Not Currently     Alcohol/week: 0.0 standard drinks     Comment: QUIT   • Drug use: Not Currently     Comment: quit   • Sexual activity: Defer     Comment: Same sex relationship         Procedures      Objective:       Physical Exam    /85   Pulse 120   Resp 16   Ht 154.9 cm (61\")   Wt 128 kg (283 lb)   LMP 08/29/2021 (Approximate)   SpO2 96%   BMI 53.47 kg/m²   The patient is alert, oriented and in no distress.    Vital signs as noted above.  Exogenous obesity (BMI 53)    Head and neck revealed no carotid bruits or jugular venous distension.  No thyromegaly or lymphadenopathy is present.    Lungs clear.  No wheezing.  Breath sounds are normal bilaterally.    Heart normal first and second heart sounds.  No " murmur..  No pericardial rub is present.  No gallop is present.    Abdomen soft and nontender.  No organomegaly is present.    Extremities revealed good peripheral pulses without any pedal edema.    Skin warm and dry.    Musculoskeletal system is grossly normal.    CNS grossly normal.    Reviewed and unchanged from last visit.

## 2021-09-10 ENCOUNTER — OFFICE VISIT (OUTPATIENT)
Dept: BARIATRICS/WEIGHT MGMT | Facility: CLINIC | Age: 26
End: 2021-09-10

## 2021-09-10 VITALS
RESPIRATION RATE: 18 BRPM | WEIGHT: 279.4 LBS | TEMPERATURE: 98.6 F | SYSTOLIC BLOOD PRESSURE: 113 MMHG | HEIGHT: 61 IN | BODY MASS INDEX: 52.75 KG/M2 | HEART RATE: 82 BPM | DIASTOLIC BLOOD PRESSURE: 80 MMHG

## 2021-09-10 PROCEDURE — 99024 POSTOP FOLLOW-UP VISIT: CPT | Performed by: SURGERY

## 2021-09-10 NOTE — PROGRESS NOTES
[facility]G [facility] BARIATRIC SURGERY  [address] [place] [address]  [address] [place] [address]  Dept: [phone]  9/10/2021      Reema Schumacher.  [id number]  [id number]  1995  female      Chief Complaint   Patient presents with   • Post-op     1 week, sleeve       BH Post-Op Bariatric Surgery:     HPI:   Weight loss in the last week:    Wt Readings from Last 10 Encounters:   09/10/21 127 kg (279 lb 6.4 oz)   09/07/21 128 kg (283 lb)   08/31/21 133 kg (293 lb 3.4 oz)   08/13/21 (!) 138 kg (304 lb)   07/08/21 (!) 139 kg (306 lb)   07/08/21 (!) 139 kg (306 lb)   07/07/21 (!) 139 kg (306 lb 9.6 oz)   07/07/21 (!) 140 kg (309 lb)   07/06/21 (!) 141 kg (311 lb 11.7 oz)   06/22/21 (!) 138 kg (305 lb)       Review of Systems   All other systems reviewed and are negative.      Patient Active Problem List   Diagnosis   • Abdominal pain   • Acute bronchitis   • ADHD   • Breast cyst, right   • Gastroesophageal reflux disease   • Hypertension   • Lumbar strain   • Maxillary sinusitis   • Mixed anxiety depressive disorder   • Morbid obesity (CMS/HCC)   • Plantar wart   • Pregnancy   • Shoulder pain, right   • BMI 50.0-59.9, adult (CMS/HCC)   • Pre-operative examination   • Obesity, Class III, BMI 40-49.9 (morbid obesity) (CMS/HCC)       The following portions of the patient's history were reviewed and updated as appropriate: allergies, current medications, past family history, past medical history, past social history, past surgical history and problem list.    Vitals:    09/10/21 0932   BP: 113/80   Pulse: 82   Resp: 18   Temp: 98.6 °F (37 °C)       Physical Exam  Constitutional:       General: She is not in acute distress.     Appearance: She is not toxic-appearing.   Pulmonary:      Effort: Pulmonary effort is normal.   Abdominal:      General: There is no distension.   Musculoskeletal:         General: No swelling or tenderness.   Skin:      Coloration: Skin is not jaundiced.      Findings: No erythema.   Neurological:      Mental Status: She is alert.         Assessment:   Post-op, the patient is doing well.     Plan:   Reviewed with patient the importance of following the manual for diet progression. Increase activity as tolerated. Continue increasing daily intake of protein and water.   Return to work: the patient is to return to 3 weeks from their surgery date with no restrictions unless they develop medical problems in which we will see them back in the office. They received a note in our office today with their return to work date.  Activity restrictions: no lifting, pushing or pulling over 25lbs for 3 weeks.   Recommended patient be sure to get at least 70 grams of protein per day. Discussed with the patient the recommended amount of water per day to intake. Reviewed vitamin requirements. Be sure to do routine exercise and increase activity as tolerated. No asa, nsaids or steroids for 8 weeks. Patient may use miralax as needed if necessary.     Instructions / Recommendations: dietary counseling recommended, recommended a daily protein intake of  grams, vitamin supplement(s) recommended, recommended exercising at least 150 minutes per week, behavior modifications recommended and instructed to call the office for concerns, questions, or problems.     The patient was instructed to follow up at one month follow up appt.     The patient was counseled regarding post op bariatric manual  Answers for HPI/ROS submitted by the patient on 9/10/2021  Please describe your symptoms.: Post op  Have you had these symptoms before?: No  How long have you been having these symptoms?: 1-2 weeks  What is the primary reason for your visit?: Other

## 2021-09-16 NOTE — TELEPHONE ENCOUNTER
Patient was able to  Pantoprazole from [employer] using her primary insurance for a $10 co-pay 9/16/21 [place]

## 2021-10-04 DIAGNOSIS — F98.8 ATTENTION DEFICIT DISORDER (ADD) WITHOUT HYPERACTIVITY: ICD-10-CM

## 2021-10-04 RX ORDER — ERGOCALCIFEROL 1.25 MG/1
50000 CAPSULE ORAL
Qty: 12 CAPSULE | Refills: 0 | OUTPATIENT
Start: 2021-10-04

## 2021-10-11 DIAGNOSIS — E55.9 VITAMIN D DEFICIENCY: Primary | ICD-10-CM

## 2021-10-11 RX ORDER — ERGOCALCIFEROL 1.25 MG/1
CAPSULE ORAL
Qty: 12 CAPSULE | Refills: 0 | OUTPATIENT
Start: 2021-10-11

## 2021-10-18 ENCOUNTER — TELEPHONE (OUTPATIENT)
Dept: BARIATRICS/WEIGHT MGMT | Facility: CLINIC | Age: 26
End: 2021-10-18

## 2021-10-18 NOTE — TELEPHONE ENCOUNTER
Reema had to reschedule her 1 month post op appt. However, she can't come in till late November, which will be 2 months after surgery. She wants to know if she needs to do anything between now and then, like blood work etc?

## 2021-11-04 RX ORDER — SUCRALFATE 1 G/1
TABLET ORAL
Qty: 120 TABLET | Refills: 1 | Status: SHIPPED | OUTPATIENT
Start: 2021-11-04 | End: 2022-09-12

## 2021-11-08 DIAGNOSIS — F98.8 ATTENTION DEFICIT DISORDER (ADD) WITHOUT HYPERACTIVITY: ICD-10-CM

## 2021-11-08 NOTE — TELEPHONE ENCOUNTER
Caller: Reema Schumacher    Relationship: Self    Requested Prescriptions:       lisdexamfetamine (Vyvanse) 40 MG capsule         Pharmacy where request should be sent:   [facility] [address] - [phone] [place] [phone] [phone].    Best call back number: [phone]    Does the patient have less than a 3 day supply:  [] Yes  [x] No    Kaley Faustin, [employer] Rep   11/08/21 08:24 EST     PLEASE ADVISE.

## 2021-11-29 ENCOUNTER — OFFICE VISIT (OUTPATIENT)
Dept: BARIATRICS/WEIGHT MGMT | Facility: CLINIC | Age: 26
End: 2021-11-29

## 2021-11-29 VITALS
RESPIRATION RATE: 16 BRPM | WEIGHT: 248.8 LBS | HEART RATE: 64 BPM | OXYGEN SATURATION: 99 % | SYSTOLIC BLOOD PRESSURE: 120 MMHG | DIASTOLIC BLOOD PRESSURE: 66 MMHG | BODY MASS INDEX: 46.97 KG/M2 | HEIGHT: 61 IN

## 2021-11-29 DIAGNOSIS — E66.01 OBESITY, CLASS III, BMI 40-49.9 (MORBID OBESITY) (HCC): Primary | ICD-10-CM

## 2021-11-29 PROCEDURE — 99024 POSTOP FOLLOW-UP VISIT: CPT | Performed by: NURSE PRACTITIONER

## 2021-11-29 RX ORDER — ONDANSETRON HYDROCHLORIDE 8 MG/1
8 TABLET, FILM COATED ORAL EVERY 8 HOURS PRN
Qty: 30 TABLET | Refills: 6 | Status: SHIPPED | OUTPATIENT
Start: 2021-11-29 | End: 2022-02-21 | Stop reason: SDUPTHER

## 2021-11-29 RX ORDER — MULTIPLE VITAMINS W/ MINERALS TAB 9MG-400MCG
1 TAB ORAL DAILY
COMMUNITY
End: 2022-09-12

## 2021-11-29 NOTE — PROGRESS NOTES
[facility] BAR SURG [facility] BARIATRIC SURGERY  [address] [place] [address]  [address] [place] [address]  Dept: [phone]  11/29/2021      Reema Schumacher.  [id number]  [id number]  1995  female    3 month gastric sleeve     BH Post-Op Bariatric Surgery:   Reema Schumacher is status post procedure listed above  HPI:     Wt Readings from Last 10 Encounters:   11/29/21 113 kg (248 lb 12.8 oz)   09/10/21 127 kg (279 lb 6.4 oz)   09/07/21 128 kg (283 lb)   08/31/21 133 kg (293 lb 3.4 oz)   08/13/21 (!) 138 kg (304 lb)   07/08/21 (!) 139 kg (306 lb)   07/08/21 (!) 139 kg (306 lb)   07/07/21 (!) 139 kg (306 lb 9.6 oz)   07/07/21 (!) 140 kg (309 lb)   07/06/21 (!) 141 kg (311 lb 11.7 oz)        Today's weight is 113 kg (248 lb 12.8 oz) pounds, today's BMI is Body mass index is 47.01 kg/m².,@ has a  loss of 31 pounds since the last visit and@ weight loss since surgery is 56 pounds. The patient reports a decreased portion size and loss of appetite.      Reema Schumacher is having some nausea/ vomiting , some GERD , but controlled overall with Protonix      Diet and Exercise: Diet history reviewed and discussed with the patient. Weight loss/gains to date discussed with the patient. The patient states they are eating 50+ grams of protein per day. She reports eating 2 meals per day, a typical portion size of 1/2 cup, eating 1 snacks per day, drinking 8 or more 8-oz. glasses of water per day, no carbonated beverage consumption and exercising regularly.     Breakfast: eggs and toast, english muffin   Snacks: protein chips, protein  Bars, chips, chicken jerky   DinnerChicken/ turkey and veggies- raw broccoli or kale  , mashed potatoes   Drinks: No carbonation , water, iced coffee with oat milk, hot tea   Exercise: walking more , 30-45 minutes a day , has a gym membership but hasn't used yet      pt's Father passed away last week    Supplements: protein chips,  protein shakes - premier or atkins, protein  Bars     Review of Systems   Constitutional: Positive for activity change, appetite change and fatigue.   Respiratory: Negative.    Cardiovascular: Negative.    Gastrointestinal: Negative.    Musculoskeletal: Negative.    Psychiatric/Behavioral:        Father passed away last week       Patient Active Problem List   Diagnosis   • Abdominal pain   • Acute bronchitis   • ADHD   • Breast cyst, right   • Gastroesophageal reflux disease   • Hypertension   • Lumbar strain   • Maxillary sinusitis   • Mixed anxiety depressive disorder   • Morbid obesity ([facility])   • Plantar wart   • Pregnancy   • Shoulder pain, right   • BMI 50.0-59.9, adult ([facility])   • Pre-operative examination   • Obesity, Class III, BMI 40-49.9 (morbid obesity) ([facility])       Past Medical History:   Diagnosis Date   • ADHD    • Depression with anxiety    • Family history of diabetes mellitus (DM)    • Family history of heart disease    • GERD (gastroesophageal reflux disease)    • Hypertension    • Obesity    • PONV (postoperative nausea and vomiting)    • Vitamin D deficiency        The following portions of the patient's history were reviewed and updated as appropriate: allergies, current medications, past family history, past medical history, past social history, past surgical history and problem list.    Vitals:    11/29/21 0907   BP: 120/66   Pulse: 64   Resp: 16   SpO2: 99%       Physical Exam  Constitutional:       Appearance: Normal appearance. She is obese.   Cardiovascular:      Rate and Rhythm: Normal rate and regular rhythm.   Pulmonary:      Effort: Pulmonary effort is normal.      Breath sounds: Normal breath sounds.   Abdominal:      General: Abdomen is flat. Bowel sounds are normal.      Palpations: Abdomen is soft.   Skin:     General: Skin is warm and dry.   Neurological:      General: No focal deficit present.      Mental Status: She is alert and oriented to person, place, and time.   Psychiatric:          Mood and Affect: Mood normal.         Behavior: Behavior normal.         Thought Content: Thought content normal.         Judgment: Judgment normal.           Assessment:   Post-op, the patient is doing well. Her father passed away last week. She is only getting 50 grams of protein in her diet. Encourage 60+ grams of protein daily and trying drinking 1 protein shake a day. Also encourage 20-30 minutes of exercise 2-3 days a week. Plan to check labs ( pt missed 1 month apt) and follow up in 3 months.     Will refill Zofran tablets today due to nausea    Plan:     Encouraged patient to be sure to get plenty of lean protein per day through small frequent meals all with a protein source.   Activity restrictions: none.   Recommended patient be sure to get at least 70 grams of protein per day by eating small, frequent meals all with high lean protein choices. Be sure to limit/cut back on daily carbohydrate intake. Discussed with the patient the recommended amount of water per day to intake- half of body weight in ounces. Reviewed vitamin requirements. Be sure to do routine exercise, 150 minutes per week minimum, including both cardio and strength training.     Instructions / Recommendations: dietary counseling recommended, recommended a daily protein intake of  grams, vitamin supplement(s) recommended, recommended exercising at least 150 minutes per week, behavior modifications recommended and instructed to call the office for concerns, questions, or problems.     The patient was instructed to follow up in 3 months.     The patient was counseled regarding. Total time spent face to face was 30 minutes and 25 minutes was spent counseling.     Jud England APRCADENCE  [facility]

## 2021-12-15 ENCOUNTER — TELEPHONE (OUTPATIENT)
Dept: FAMILY MEDICINE CLINIC | Facility: CLINIC | Age: 26
End: 2021-12-15

## 2021-12-15 DIAGNOSIS — F98.8 ATTENTION DEFICIT DISORDER (ADD) WITHOUT HYPERACTIVITY: ICD-10-CM

## 2021-12-15 RX ORDER — VENLAFAXINE HYDROCHLORIDE 75 MG/1
75 CAPSULE, EXTENDED RELEASE ORAL DAILY
Qty: 90 CAPSULE | Refills: 1 | Status: SHIPPED | OUTPATIENT
Start: 2021-12-15 | End: 2022-02-21 | Stop reason: SDUPTHER

## 2021-12-15 NOTE — TELEPHONE ENCOUNTER
Caller: Reema Schumacher    Relationship: Self    Best call back number: [phone]    Requested Prescriptions:   Requested Prescriptions     Pending Prescriptions Disp Refills   • lisdexamfetamine (Vyvanse) 40 MG capsule 30 capsule 0     Sig: Take 1 capsule by mouth Every Morning   • venlafaxine XR (EFFEXOR-XR) 75 MG 24 hr capsule 90 capsule 1     Sig: Take 1 capsule by mouth Daily.        Pharmacy where request should be sent: [facility] [place] [phone] [place] [phone] FX     Additional details provided by patient: N/A    Does the patient have less than a 3 day supply:  [] Yes  [x] No    Justin Ferreira Rep   12/15/21 12:09 EST

## 2022-02-01 DIAGNOSIS — F98.8 ATTENTION DEFICIT DISORDER (ADD) WITHOUT HYPERACTIVITY: ICD-10-CM

## 2022-02-21 DIAGNOSIS — F98.8 ATTENTION DEFICIT DISORDER (ADD) WITHOUT HYPERACTIVITY: ICD-10-CM

## 2022-02-21 RX ORDER — VENLAFAXINE HYDROCHLORIDE 75 MG/1
75 CAPSULE, EXTENDED RELEASE ORAL DAILY
Qty: 90 CAPSULE | Refills: 1 | Status: SHIPPED | OUTPATIENT
Start: 2022-02-21 | End: 2022-10-04 | Stop reason: SDUPTHER

## 2022-02-23 RX ORDER — ONDANSETRON HYDROCHLORIDE 8 MG/1
8 TABLET, FILM COATED ORAL EVERY 8 HOURS PRN
Qty: 30 TABLET | Refills: 6 | Status: SHIPPED | OUTPATIENT
Start: 2022-02-23 | End: 2022-06-14 | Stop reason: SDUPTHER

## 2022-02-28 ENCOUNTER — TELEPHONE (OUTPATIENT)
Dept: BARIATRICS/WEIGHT MGMT | Facility: CLINIC | Age: 27
End: 2022-02-28

## 2022-02-28 NOTE — TELEPHONE ENCOUNTER
Called pt to change appt to video chat due to Jud being called out of the office this morning, ~Krista

## 2022-03-09 ENCOUNTER — OFFICE VISIT (OUTPATIENT)
Dept: FAMILY MEDICINE CLINIC | Facility: CLINIC | Age: 27
End: 2022-03-09

## 2022-03-09 VITALS
BODY MASS INDEX: 41.76 KG/M2 | WEIGHT: 221 LBS | HEART RATE: 88 BPM | SYSTOLIC BLOOD PRESSURE: 112 MMHG | OXYGEN SATURATION: 100 % | DIASTOLIC BLOOD PRESSURE: 74 MMHG | TEMPERATURE: 97.5 F

## 2022-03-09 DIAGNOSIS — E66.01 OBESITY, CLASS III, BMI 40-49.9 (MORBID OBESITY): ICD-10-CM

## 2022-03-09 DIAGNOSIS — F41.8 MIXED ANXIETY DEPRESSIVE DISORDER: ICD-10-CM

## 2022-03-09 DIAGNOSIS — I10 PRIMARY HYPERTENSION: Primary | ICD-10-CM

## 2022-03-09 PROCEDURE — 99214 OFFICE O/P EST MOD 30 MIN: CPT | Performed by: FAMILY MEDICINE

## 2022-03-09 RX ORDER — HYDROXYZINE HYDROCHLORIDE 10 MG/1
10 TABLET, FILM COATED ORAL NIGHTLY PRN
Qty: 90 TABLET | Refills: 1 | Status: SHIPPED | OUTPATIENT
Start: 2022-03-09 | End: 2022-06-14 | Stop reason: SDUPTHER

## 2022-03-09 NOTE — PROGRESS NOTES
Subjective   Reema Schumacher is a 26 y.o. female.     Reema Schumacher is in for follow up on her chronic issues with anxiety and obesity. She also has high blood pressure but she has been feeling lightheaded some since losing over 80 pounds following gastric sleeve surgery. She has not had any syncope. . There is no history of chest pain or dyspnea. There is no history of issue with bowel or bladder dysfunction.  There is no history of issue with sleep or mood. There is no history of issue with present medication.            /74   Pulse 88   Temp 97.5 °F (36.4 °C) (Temporal)   Wt 100 kg (221 lb)   SpO2 100%   BMI 41.76 kg/m²       Chief Complaint   Patient presents with   • Anxiety     6 month f/u    • Weight Loss     Want to see if you kristofer take over weight loss management            Current Outpatient Medications:   •  calcium citrate-vitamin d (CALCITRATE) 315-250 MG-UNIT tablet tablet, Take  by mouth 2 (Two) Times a Day. Fusion calcium chews, Disp: , Rfl:   •  hydrOXYzine (ATARAX) 10 MG tablet, Take 1 tablet by mouth At Night As Needed for Anxiety (sleep)., Disp: 90 tablet, Rfl: 1  •  lisdexamfetamine (Vyvanse) 40 MG capsule, Take 1 capsule by mouth Every Morning, Disp: 30 capsule, Rfl: 0  •  multivitamin with minerals tablet tablet, Take 1 tablet by mouth Daily. Bariatric specific, Disp: , Rfl:   •  omeprazole (priLOSEC) 40 MG capsule, Take 40 mg by mouth Daily. Wont take preop, Disp: , Rfl:   •  ondansetron (Zofran) 8 MG tablet, Take 1 tablet by mouth Every 8 (Eight) Hours As Needed for Nausea or Vomiting., Disp: 30 tablet, Rfl: 6  •  venlafaxine XR (EFFEXOR-XR) 75 MG 24 hr capsule, Take 1 capsule by mouth Daily., Disp: 90 capsule, Rfl: 1  •  HYDROcodone-acetaminophen (NORCO) 5-325 MG per tablet, Take 1 tablet by mouth Every 4 (Four) Hours As Needed for Moderate Pain ., Disp: 10 tablet, Rfl: 0  •  sucralfate (CARAFATE) 1 g tablet, TAKE ONE TABLET BY MOUTH FOUR TIMES A DAY, Disp: 120 tablet, Rfl:  1  •  vitamin D (ERGOCALCIFEROL) 1.25 MG (70791 UT) capsule capsule, Take 1 capsule by mouth Every 7 (Seven) Days. (Patient taking differently: Take 50,000 Units by mouth Every 7 (Seven) Days. friday), Disp: 12 capsule, Rfl: 0        The following portions of the patient's history were reviewed and updated as appropriate: allergies, current medications, past family history, past medical history, past social history, past surgical history, and problem list.    Review of Systems   Constitutional: Negative for activity change, fatigue and fever.   HENT: Negative for congestion, sinus pressure, sinus pain, sore throat and trouble swallowing.    Eyes: Negative for visual disturbance.   Respiratory: Negative for chest tightness, shortness of breath and wheezing.    Cardiovascular: Negative for chest pain.   Gastrointestinal: Negative for abdominal distention, abdominal pain, constipation, diarrhea, nausea and vomiting.   Genitourinary: Negative for difficulty urinating, dysuria, frequency and urgency.   Musculoskeletal: Negative for back pain and neck pain.   Neurological: Positive for numbness. Negative for dizziness, tremors, weakness and light-headedness.   Psychiatric/Behavioral: Negative for agitation, decreased concentration, hallucinations, sleep disturbance and suicidal ideas. The patient is nervous/anxious.        Objective   Physical Exam  Vitals and nursing note reviewed.   Constitutional:       Appearance: She is obese.   Eyes:      Conjunctiva/sclera: Conjunctivae normal.      Pupils: Pupils are equal, round, and reactive to light.   Cardiovascular:      Rate and Rhythm: Normal rate and regular rhythm.      Heart sounds: Normal heart sounds.   Pulmonary:      Effort: Pulmonary effort is normal.      Breath sounds: No wheezing or rales.   Abdominal:      General: Bowel sounds are normal.      Palpations: Abdomen is soft.   Musculoskeletal:      Cervical back: Neck supple.      Right lower leg: No edema.       Left lower leg: No edema.   Lymphadenopathy:      Cervical: No cervical adenopathy.   Skin:     Findings: No rash.   Neurological:      General: No focal deficit present.      Mental Status: She is alert and oriented to person, place, and time.   Psychiatric:         Mood and Affect: Mood normal.           Assessment/Plan   Problems Addressed this Visit        Cardiac and Vasculature    Hypertension - Primary    Relevant Orders    Comprehensive metabolic panel    Lipid panel       Endocrine and Metabolic    Obesity, Class III, BMI 40-49.9 (morbid obesity) ([facility])    Relevant Orders    Iron and TIBC    CBC w AUTO Differential    Vitamin D 25 hydroxy    Vitamin B12       Mental Health    Mixed anxiety depressive disorder    Relevant Medications    hydrOXYzine (ATARAX) 10 MG tablet      Diagnoses       Codes Comments    Primary hypertension    -  Primary ICD-10-CM: I10  ICD-9-CM: 401.9     Mixed anxiety depressive disorder     ICD-10-CM: F41.8  ICD-9-CM: 300.4     Obesity, Class III, BMI 40-49.9 (morbid obesity) ([facility])     ICD-10-CM: E66.01  ICD-9-CM: 278.01         We discussed diet and exercise changes to continue working to help improve on the current obesity issue; this will be aided by her prior bariatric surgery  I will give orders for labs to update chronic issues and make sure she is not anemic or dehydrated, given recent concerns  I will see back later in the year  She is to call for new concerns and is to continue with her mental health visits for the anxiety

## 2022-04-10 DIAGNOSIS — F98.8 ATTENTION DEFICIT DISORDER (ADD) WITHOUT HYPERACTIVITY: ICD-10-CM

## 2022-06-14 DIAGNOSIS — F98.8 ATTENTION DEFICIT DISORDER (ADD) WITHOUT HYPERACTIVITY: ICD-10-CM

## 2022-06-14 RX ORDER — ONDANSETRON HYDROCHLORIDE 8 MG/1
8 TABLET, FILM COATED ORAL EVERY 8 HOURS PRN
Qty: 30 TABLET | Refills: 6 | Status: SHIPPED | OUTPATIENT
Start: 2022-06-14

## 2022-06-20 RX ORDER — HYDROXYZINE HYDROCHLORIDE 10 MG/1
10 TABLET, FILM COATED ORAL NIGHTLY PRN
Qty: 90 TABLET | Refills: 0 | Status: SHIPPED | OUTPATIENT
Start: 2022-06-20

## 2022-09-12 ENCOUNTER — OFFICE VISIT (OUTPATIENT)
Dept: FAMILY MEDICINE CLINIC | Facility: CLINIC | Age: 27
End: 2022-09-12

## 2022-09-12 VITALS
HEIGHT: 61 IN | TEMPERATURE: 98.2 F | OXYGEN SATURATION: 98 % | WEIGHT: 196.6 LBS | HEART RATE: 78 BPM | DIASTOLIC BLOOD PRESSURE: 76 MMHG | BODY MASS INDEX: 37.12 KG/M2 | SYSTOLIC BLOOD PRESSURE: 112 MMHG

## 2022-09-12 DIAGNOSIS — B37.2 CANDIDAL SKIN INFECTION: ICD-10-CM

## 2022-09-12 DIAGNOSIS — Z90.3 H/O GASTRIC SLEEVE: ICD-10-CM

## 2022-09-12 DIAGNOSIS — L98.7 EXCESSIVE SKIN AND SUBCUTANEOUS TISSUE: ICD-10-CM

## 2022-09-12 DIAGNOSIS — Z00.00 PREVENTATIVE HEALTH CARE: ICD-10-CM

## 2022-09-12 DIAGNOSIS — R42 DIZZINESS: ICD-10-CM

## 2022-09-12 DIAGNOSIS — E65 ABDOMINAL PANNUS: ICD-10-CM

## 2022-09-12 DIAGNOSIS — E66.9 CLASS 2 OBESITY WITHOUT SERIOUS COMORBIDITY WITH BODY MASS INDEX (BMI) OF 37.0 TO 37.9 IN ADULT, UNSPECIFIED OBESITY TYPE: Primary | ICD-10-CM

## 2022-09-12 DIAGNOSIS — Z87.898 HISTORY OF SUBSTANCE USE DISORDER: ICD-10-CM

## 2022-09-12 PROCEDURE — 99214 OFFICE O/P EST MOD 30 MIN: CPT | Performed by: NURSE PRACTITIONER

## 2022-09-12 PROCEDURE — 86803 HEPATITIS C AB TEST: CPT | Performed by: NURSE PRACTITIONER

## 2022-09-12 PROCEDURE — 80050 GENERAL HEALTH PANEL: CPT | Performed by: NURSE PRACTITIONER

## 2022-09-12 PROCEDURE — 80061 LIPID PANEL: CPT | Performed by: NURSE PRACTITIONER

## 2022-09-12 PROCEDURE — 82607 VITAMIN B-12: CPT | Performed by: NURSE PRACTITIONER

## 2022-09-12 PROCEDURE — 82306 VITAMIN D 25 HYDROXY: CPT | Performed by: NURSE PRACTITIONER

## 2022-09-12 PROCEDURE — 84425 ASSAY OF VITAMIN B-1: CPT | Performed by: NURSE PRACTITIONER

## 2022-09-12 RX ORDER — ATOMOXETINE 25 MG/1
25 CAPSULE ORAL DAILY
COMMUNITY
Start: 2022-08-22

## 2022-09-12 RX ORDER — MIRTAZAPINE 7.5 MG/1
7.5 TABLET, FILM COATED ORAL DAILY PRN
COMMUNITY
Start: 2022-08-09

## 2022-09-12 RX ORDER — PANTOPRAZOLE SODIUM 40 MG/1
40 TABLET, DELAYED RELEASE ORAL DAILY
COMMUNITY
Start: 2022-08-22

## 2022-09-12 RX ORDER — BREXPIPRAZOLE 1 MG/1
1 TABLET ORAL DAILY
COMMUNITY
Start: 2022-08-30 | End: 2022-10-04 | Stop reason: SDUPTHER

## 2022-09-12 RX ORDER — NYSTATIN 100000 [USP'U]/G
POWDER TOPICAL 4 TIMES DAILY PRN
Qty: 60 G | Refills: 2 | Status: SHIPPED | OUTPATIENT
Start: 2022-09-12

## 2022-09-12 RX ORDER — HYDROXYZINE PAMOATE 25 MG/1
25 CAPSULE ORAL DAILY PRN
COMMUNITY
Start: 2022-08-07

## 2022-09-12 NOTE — PROGRESS NOTES
Chief Complaint  Chief Complaint   Patient presents with   • Establish Care     Previous pcp Akshat Jones MD.  Pt would like to discuss options for removal of extra skin she has lost over 100lb recently.   • Hypertension   • Anxiety   • Med Refill     Pantaprozole refill   • Addiction Problem     Pt has recovered recently.         Subjective          Reema Schumacher presents to [facility] for   History of Present Illness     New 27-year-old female patient presents to establish care with new provider.      History of hypertension, prior to bariatric surgery, no longer requiring medications and reports BP is stable in the 1 teens systolic at home as well.    GERD, stable on medication, denies nausea, vomiting, constipation, abdominal pain and diarrhea.    Bipolar, depression, anxiety, attention deficit disorder, she follows with psychiatry on her, Rexulti, hydroxyzine for anxiety and Strattera for ADD    She underwent laparoscopic gastric sleeve 8/31/2021 per Dr. Chirinos, she has lost 100 pounds, has developed rash, blood blisters, excoriation and occasional infection under abd pannus, under breasts and between the legs. Uses diaper rash cream and powder. She reports occasional dizziness when standing, she drinks plenty of water, also drinks high amount of caffeine.  She stopped taking bariatric vitamins.  Overall she does feel better, reports improvement, joint pain and hypertension.     History of substance abuse, she was recently inpatient at [facility], she was using alcohol, marijauna, cocaine, ecstasy since age of 16, she reports being abstinent from all drugs and alcohol for 46 days, currently living in sober living house, has sponsor, going to 6 meetings/week.  She denies cravings and feels good with the current regimen.         The following portions of the patient's history were reviewed and updated as appropriate: allergies, current medications, past family  history, past medical history, past social history, past surgical history and problem list.    Past Medical History:   Diagnosis Date   • ADHD    • Bipolar 1 disorder (HCC) 2022   • Depression with anxiety    • Family history of diabetes mellitus (DM)    • Family history of heart disease    • GERD (gastroesophageal reflux disease)    • Hypertension    • Obesity    • PONV (postoperative nausea and vomiting)    • Vitamin D deficiency      Past Surgical History:   Procedure Laterality Date   •  SECTION  2016   • ENDOSCOPY N/A 2020    Procedure: ESOPHAGOGASTRODUODENOSCOPY WITH BIOPSY X2 AREAS;  Surgeon: Kenney Hodgson MD;  Location: [facility] ENDOSCOPY;  Service: Gastroenterology;  Laterality: N/A;  duodenitis, duodenal ulcers, gastric ulcers   • ENDOSCOPY N/A 2021    Procedure: ESOPHAGOGASTRODUODENOSCOPY WITH BIOPSY X2 AREAS;  Surgeon: Kenney Hodgson MD;  Location: [facility] ENDOSCOPY;  Service: Gastroenterology;  Laterality: N/A;  duodenitis and gastric ulcers, ESOPHAGITIS   • GASTRIC SLEEVE LAPAROSCOPIC N/A 2021    Procedure: GASTRIC SLEEVE LAPAROSCOPIC WITH DAVINCI ROBOT;  Surgeon: Yesenia Chirinos MD;  Location: [facility] MAIN OR;  Service: Robotics - DaVinci;  Laterality: N/A;   • PLANTAR'S WART EXCISION Bilateral    • WISDOM TOOTH EXTRACTION       Family History   Problem Relation Age of Onset   • Hypertension Mother    • Obesity Mother    • Heart disease Mother    • Crohn's disease Mother    • Heart attack Father    • Stroke Father    • Diabetes Father      Social History     Tobacco Use   • Smoking status: Former Smoker     Packs/day: 0.50     Years: 6.00     Pack years: 3.00     Types: Cigarettes, Electronic Cigarette     Start date:      Quit date: 2018     Years since quittin.6   • Smokeless tobacco: Never Used   Substance Use Topics   • Alcohol use: Not Currently     Alcohol/week: 0.0 standard drinks     Comment: QUIT       Current Outpatient Medications:   •  atomoxetine  "(STRATTERA) 25 MG capsule, Take 25 mg by mouth Daily., Disp: , Rfl:   •  hydrOXYzine (ATARAX) 10 MG tablet, Take 1 tablet by mouth At Night As Needed for Anxiety (Sleep)., Disp: 90 tablet, Rfl: 0  •  hydrOXYzine pamoate (VISTARIL) 25 MG capsule, Take 25 mg by mouth Daily As Needed., Disp: , Rfl:   •  mirtazapine (REMERON) 7.5 MG tablet, Take 7.5 mg by mouth Daily As Needed., Disp: , Rfl:   •  ondansetron (Zofran) 8 MG tablet, Take 1 tablet by mouth Every 8 (Eight) Hours As Needed for Nausea or Vomiting., Disp: 30 tablet, Rfl: 6  •  pantoprazole (PROTONIX) 40 MG EC tablet, Take 40 mg by mouth Daily., Disp: , Rfl:   •  Rexulti 1 MG tablet, Take 1 mg by mouth Daily., Disp: , Rfl:   •  venlafaxine XR (EFFEXOR-XR) 75 MG 24 hr capsule, Take 1 capsule by mouth Daily., Disp: 90 capsule, Rfl: 1  •  nystatin (MYCOSTATIN) 780416 UNIT/GM powder, Apply  topically to the appropriate area as directed 4 (Four) Times a Day As Needed (to abdominal pannus and skin fold rash)., Disp: 60 g, Rfl: 2    Objective   Vital Signs:   /76 (BP Location: Left arm, Patient Position: Sitting, Cuff Size: Large Adult)   Pulse 78   Temp 98.2 °F (36.8 °C) (Temporal)   Ht 154.9 cm (61\")   Wt 89.2 kg (196 lb 9.6 oz)   SpO2 98%   BMI 37.15 kg/m²           Physical Exam  Vitals and nursing note reviewed.   Constitutional:       General: She is not in acute distress.     Appearance: She is well-developed. She is obese. She is not diaphoretic.   Eyes:      Pupils: Pupils are equal, round, and reactive to light.   Neck:      Thyroid: No thyromegaly.      Vascular: No JVD.   Cardiovascular:      Rate and Rhythm: Normal rate and regular rhythm.      Heart sounds: Normal heart sounds. No murmur heard.  Pulmonary:      Effort: Pulmonary effort is normal. No respiratory distress.      Breath sounds: Normal breath sounds. No wheezing or rhonchi.   Abdominal:      General: Bowel sounds are normal. There is no distension.      Palpations: Abdomen is soft. "      Tenderness: There is no abdominal tenderness.   Musculoskeletal:         General: No tenderness. Normal range of motion.      Cervical back: Normal range of motion and neck supple.   Skin:     General: Skin is warm and dry.      Findings: Rash (Minimal erythema under abdominal pannus, with scarring and evidence of previous local skin infections) present.   Neurological:      General: No focal deficit present.      Mental Status: She is alert and oriented to person, place, and time. Mental status is at baseline.      Sensory: No sensory deficit.   Psychiatric:         Mood and Affect: Mood normal.         Behavior: Behavior normal.         Thought Content: Thought content normal.         Judgment: Judgment normal.          Result Review :     No visits with results within 7 Day(s) from this visit.   Latest known visit with results is:   Admission on 08/31/2021, Discharged on 09/01/2021   Component Date Value Ref Range Status   • HCG, Urine QL 08/31/2021 Negative  Negative Final   • Case Report 08/31/2021    Final                    Value:Surgical Pathology Report                         Case: [id number]                                  Authorizing Provider:  Yesenia Chirinos MD            Collected:           08/31/2021 08:08 AM          Ordering Location:     [facility] MAIN  Received:            08/31/2021 01:54 PM                                 OR                                                                           Pathologist:           Maykel Limon MD                                                            Specimen:    Stomach, Greater curvature, GREATER CURVATURE OF STOMACH                                  • Final Diagnosis 08/31/2021    Final                    Value:This result contains rich text formatting which cannot be displayed here.   • Gross Description 08/31/2021    Final                    Value:This result contains rich text formatting which cannot be displayed here.   •  Glucose 09/01/2021 105 (A) 65 - 99 mg/dL Final   • BUN 09/01/2021 5 (A) 6 - 20 mg/dL Final   • Creatinine 09/01/2021 0.62  0.57 - 1.00 mg/dL Final   • Sodium 09/01/2021 140  136 - 145 mmol/L Final   • Potassium 09/01/2021 4.0  3.5 - 5.2 mmol/L Final   • Chloride 09/01/2021 103  98 - 107 mmol/L Final   • CO2 09/01/2021 26.0  22.0 - 29.0 mmol/L Final   • Calcium 09/01/2021 8.8  8.6 - 10.5 mg/dL Final   • Total Protein 09/01/2021 7.3  6.0 - 8.5 g/dL Final   • Albumin 09/01/2021 4.20  3.50 - 5.20 g/dL Final   • ALT (SGPT) 09/01/2021 41 (A) 1 - 33 U/L Final   • AST (SGOT) 09/01/2021 29  1 - 32 U/L Final   • Alkaline Phosphatase 09/01/2021 60  39 - 117 U/L Final   • Total Bilirubin 09/01/2021 0.3  0.0 - 1.2 mg/dL Final   • eGFR Non African Amer 09/01/2021 117  >60 mL/min/1.73 Final   • Globulin 09/01/2021 3.1  gm/dL Final   • A/G Ratio 09/01/2021 1.4  g/dL Final   • BUN/Creatinine Ratio 09/01/2021 8.1  7.0 - 25.0 Final   • Anion Gap 09/01/2021 11.0  5.0 - 15.0 mmol/L Final   • Phosphorus 09/01/2021 2.8  2.5 - 4.5 mg/dL Final   • Magnesium 09/01/2021 1.9  1.6 - 2.6 mg/dL Final   • WBC 09/01/2021 15.80 (A) 3.40 - 10.80 10*3/mm3 Final   • RBC 09/01/2021 4.16  3.77 - 5.28 10*6/mm3 Final   • Hemoglobin 09/01/2021 12.7  12.0 - 15.9 g/dL Final   • Hematocrit 09/01/2021 36.7  34.0 - 46.6 % Final   • MCV 09/01/2021 88.1  79.0 - 97.0 fL Final   • MCH 09/01/2021 30.5  26.6 - 33.0 pg Final   • MCHC 09/01/2021 34.6  31.5 - 35.7 g/dL Final   • RDW 09/01/2021 12.9  12.3 - 15.4 % Final   • RDW-SD 09/01/2021 40.3  37.0 - 54.0 fl Final   • MPV 09/01/2021 8.5  6.0 - 12.0 fL Final   • Platelets 09/01/2021 311  140 - 450 10*3/mm3 Final   • Neutrophil % 09/01/2021 82.6 (A) 42.7 - 76.0 % Final   • Lymphocyte % 09/01/2021 11.4 (A) 19.6 - 45.3 % Final   • Monocyte % 09/01/2021 5.4  5.0 - 12.0 % Final   • Eosinophil % 09/01/2021 0.1 (A) 0.3 - 6.2 % Final   • Basophil % 09/01/2021 0.5  0.0 - 1.5 % Final   • Neutrophils, Absolute 09/01/2021 13.10  (A) 1.70 - 7.00 10*3/mm3 Final   • Lymphocytes, Absolute 09/01/2021 1.80  0.70 - 3.10 10*3/mm3 Final   • Monocytes, Absolute 09/01/2021 0.80  0.10 - 0.90 10*3/mm3 Final   • Eosinophils, Absolute 09/01/2021 0.00  0.00 - 0.40 10*3/mm3 Final   • Basophils, Absolute 09/01/2021 0.10  0.00 - 0.20 10*3/mm3 Final   • nRBC 09/01/2021 0.1  0.0 - 0.2 /100 WBC Final                  Class 2 Severe Obesity (BMI >=35 and <=39.9). Obesity-related health conditions include the following: hypertension and osteoarthritis. Obesity is improving with lifestyle modifications. BMI is is above average; BMI management plan is completed. We discussed low calorie, low carb based diet program, portion control and increasing exercise.           Assessment and Plan    Diagnoses and all orders for this visit:    1. Class 2 obesity without serious comorbidity with body mass index (BMI) of 37.0 to 37.9 in adult, unspecified obesity type (Primary)  -     Ambulatory Referral to General Surgery    2. H/O gastric sleeve  Comments:  praised efforts of weight loss, will refer to general surgery for eval for skin removal.   Orders:  -     Ambulatory Referral to General Surgery  -     Lipid Panel  -     Comprehensive Metabolic Panel  -     CBC (No Diff)  -     TSH  -     Hepatitis C Antibody  -     Vitamin D 25 Hydroxy  -     Vitamin B12  -     Vitamin B1, Whole Blood    3. History of substance use disorder  Comments:  Praised efforts of sobriety, continue sober living, meetings 6 days/week and sponsor.     4. Excessive skin and subcutaneous tissue  Comments:  with frequent folliculitis and yeast infections.   Orders:  -     Ambulatory Referral to General Surgery    5. Abdominal pannus  -     Ambulatory Referral to General Surgery    6. Candidal skin infection  Comments:  nystatin powder prn, notify for any s/s of infection  Orders:  -     Ambulatory Referral to General Surgery    7. Preventative health care  Comments:  labs as ordered, will notify  results.   rec pap smear    Orders:  -     Lipid Panel  -     Comprehensive Metabolic Panel  -     CBC (No Diff)  -     TSH  -     Hepatitis C Antibody  -     Vitamin D 25 Hydroxy  -     Vitamin B12  -     Vitamin B1, Whole Blood    8. Dizziness  Comments:  Likely due to not taking bariatric surgery vitamins, check labs today and will restart vitamins as indicated, inc water to 2l/day, inc protein in diet    Other orders  -     nystatin (MYCOSTATIN) [id number] UNIT/GM powder; Apply  topically to the appropriate area as directed 4 (Four) Times a Day As Needed (to abdominal pannus and skin fold rash).  Dispense: 60 g; Refill: 2      Age appropriate preventative counseling provided, including healthy lifestyle modifications and exercise        I spent 35 minutes caring for Reema Schumacher on this date of service. This time includes time spent by me in the following activities: preparing for the visit, reviewing tests, performing a medically appropriate examination and/or evaluation , counseling and educating the patient/family/caregiver, ordering medications, tests, or procedures and documenting information in the medical record        Follow Up     Return in about 6 months (around 3/12/2023), or if symptoms worsen or fail to improve, for Recheck substance abuse, HTN, weight loss. schedule pap.  Patient was given instructions and counseling regarding her condition or for health maintenance advice. Please see specific information pulled into the AVS if appropriate.        Part of this note may be an electronic transcription/translation of spoken language to printed text using the Dragon Dictation System

## 2022-09-12 NOTE — PROGRESS NOTES
Venipuncture Blood Specimen Collection  Venipuncture performed in Left Arm by Ivonne Price MA with good hemostasis. Patient tolerated the procedure well without complications.   09/12/22   Ivonne Price MA

## 2022-09-13 LAB
25(OH)D3 SERPL-MCNC: 27.3 NG/ML (ref 30–100)
ALBUMIN SERPL-MCNC: 4.2 G/DL (ref 3.5–5.2)
ALBUMIN/GLOB SERPL: 1.6 G/DL
ALP SERPL-CCNC: 65 U/L (ref 39–117)
ALT SERPL W P-5'-P-CCNC: 18 U/L (ref 1–33)
ANION GAP SERPL CALCULATED.3IONS-SCNC: 13.4 MMOL/L (ref 5–15)
AST SERPL-CCNC: 13 U/L (ref 1–32)
BILIRUB SERPL-MCNC: 0.2 MG/DL (ref 0–1.2)
BUN SERPL-MCNC: 9 MG/DL (ref 6–20)
BUN/CREAT SERPL: 13.6 (ref 7–25)
CALCIUM SPEC-SCNC: 9.5 MG/DL (ref 8.6–10.5)
CHLORIDE SERPL-SCNC: 104 MMOL/L (ref 98–107)
CHOLEST SERPL-MCNC: 190 MG/DL (ref 0–200)
CO2 SERPL-SCNC: 24.6 MMOL/L (ref 22–29)
CREAT SERPL-MCNC: 0.66 MG/DL (ref 0.57–1)
DEPRECATED RDW RBC AUTO: 41.7 FL (ref 37–54)
EGFRCR SERPLBLD CKD-EPI 2021: 124.2 ML/MIN/1.73
ERYTHROCYTE [DISTWIDTH] IN BLOOD BY AUTOMATED COUNT: 12.3 % (ref 12.3–15.4)
GLOBULIN UR ELPH-MCNC: 2.6 GM/DL
GLUCOSE SERPL-MCNC: 83 MG/DL (ref 65–99)
HCT VFR BLD AUTO: 38.7 % (ref 34–46.6)
HCV AB SER DONR QL: NORMAL
HDLC SERPL-MCNC: 38 MG/DL (ref 40–60)
HGB BLD-MCNC: 12.8 G/DL (ref 12–15.9)
LDLC SERPL CALC-MCNC: 132 MG/DL (ref 0–100)
LDLC/HDLC SERPL: 3.41 {RATIO}
MCH RBC QN AUTO: 30.5 PG (ref 26.6–33)
MCHC RBC AUTO-ENTMCNC: 33.1 G/DL (ref 31.5–35.7)
MCV RBC AUTO: 92.1 FL (ref 79–97)
PLATELET # BLD AUTO: 331 10*3/MM3 (ref 140–450)
PMV BLD AUTO: 11.3 FL (ref 6–12)
POTASSIUM SERPL-SCNC: 4.2 MMOL/L (ref 3.5–5.2)
PROT SERPL-MCNC: 6.8 G/DL (ref 6–8.5)
RBC # BLD AUTO: 4.2 10*6/MM3 (ref 3.77–5.28)
SODIUM SERPL-SCNC: 142 MMOL/L (ref 136–145)
TRIGL SERPL-MCNC: 112 MG/DL (ref 0–150)
TSH SERPL DL<=0.05 MIU/L-ACNC: 0.83 UIU/ML (ref 0.27–4.2)
VIT B12 BLD-MCNC: 340 PG/ML (ref 211–946)
VLDLC SERPL-MCNC: 20 MG/DL (ref 5–40)
WBC NRBC COR # BLD: 10.89 10*3/MM3 (ref 3.4–10.8)

## 2022-09-14 LAB — VIT B1 BLD-SCNC: 127.2 NMOL/L (ref 66.5–200)

## 2022-09-18 RX ORDER — ERGOCALCIFEROL 1.25 MG/1
CAPSULE ORAL
Qty: 15 CAPSULE | Refills: 1 | Status: SHIPPED | OUTPATIENT
Start: 2022-09-18

## 2022-10-04 RX ORDER — BREXPIPRAZOLE 1 MG/1
1 TABLET ORAL DAILY
Qty: 30 TABLET | Refills: 0 | Status: SHIPPED | OUTPATIENT
Start: 2022-10-04 | End: 2022-10-13 | Stop reason: SDUPTHER

## 2022-10-04 RX ORDER — VENLAFAXINE HYDROCHLORIDE 75 MG/1
75 CAPSULE, EXTENDED RELEASE ORAL DAILY
Qty: 30 CAPSULE | Refills: 0 | Status: SHIPPED | OUTPATIENT
Start: 2022-10-04 | End: 2022-10-13 | Stop reason: SDUPTHER

## 2022-10-04 NOTE — TELEPHONE ENCOUNTER
Patient was seen on 9/12 by Lori and at that point it was documented that all medications were followed by psychiatry.  What is her follow-up?  Did she stop seeing a psychiatrist and has an upcoming appointment to be scheduled?  I will provide her 30 days of this medication but states he needs to be aware that she is not current with a psychiatrist and to determine how she wants to proceed from there.

## 2022-10-04 NOTE — TELEPHONE ENCOUNTER
Tried calling patient, but the number is not in service. I sent her a [employer] message. She did say she has an appt with a new psychiatrist, but could not wait for that appt for her medication. I did ask her when that appt was scheduled for.

## 2022-10-04 NOTE — TELEPHONE ENCOUNTER
Lori patient and she sent a [email or web address] message earlier stating that she has been without medication for 2 weeks and she is trying to get into a new psychiatrist. She is asking for a refill of these medications because she feels she is really in need.

## 2022-10-07 PROCEDURE — 82962 GLUCOSE BLOOD TEST: CPT

## 2022-10-13 RX ORDER — VENLAFAXINE HYDROCHLORIDE 75 MG/1
75 CAPSULE, EXTENDED RELEASE ORAL DAILY
Qty: 30 CAPSULE | Refills: 0 | Status: SHIPPED | OUTPATIENT
Start: 2022-10-13

## 2022-10-13 RX ORDER — BREXPIPRAZOLE 1 MG/1
1 TABLET ORAL DAILY
Qty: 30 TABLET | Refills: 0 | Status: SHIPPED | OUTPATIENT
Start: 2022-10-13

## 2022-10-18 DIAGNOSIS — E66.9 CLASS 2 OBESITY WITHOUT SERIOUS COMORBIDITY WITH BODY MASS INDEX (BMI) OF 37.0 TO 37.9 IN ADULT, UNSPECIFIED OBESITY TYPE: Primary | ICD-10-CM

## 2022-10-18 DIAGNOSIS — E65 ABDOMINAL PANNUS: ICD-10-CM

## 2022-10-18 DIAGNOSIS — L98.7 EXCESSIVE SKIN AND SUBCUTANEOUS TISSUE: ICD-10-CM

## 2022-11-26 ENCOUNTER — HOSPITAL ENCOUNTER (EMERGENCY)
Facility: HOSPITAL | Age: 27
Discharge: HOME | End: 2022-11-27
Attending: EMERGENCY MEDICINE | Admitting: EMERGENCY MEDICINE

## 2022-11-26 DIAGNOSIS — N39.0 URINARY TRACT INFECTION WITH HEMATURIA, SITE UNSPECIFIED: ICD-10-CM

## 2022-11-26 DIAGNOSIS — O26.899 PELVIC PAIN IN PREGNANCY: ICD-10-CM

## 2022-11-26 DIAGNOSIS — R10.2 PELVIC PAIN IN PREGNANCY: ICD-10-CM

## 2022-11-26 DIAGNOSIS — O20.0 THREATENED MISCARRIAGE: Primary | ICD-10-CM

## 2022-11-26 DIAGNOSIS — R31.9 URINARY TRACT INFECTION WITH HEMATURIA, SITE UNSPECIFIED: ICD-10-CM

## 2022-11-26 PROCEDURE — 99284 EMERGENCY DEPT VISIT MOD MDM: CPT

## 2022-11-26 RX ORDER — SODIUM CHLORIDE 0.9 % (FLUSH) 0.9 %
10 SYRINGE (ML) INJECTION AS NEEDED
Status: DISCONTINUED | OUTPATIENT
Start: 2022-11-26 | End: 2022-11-27 | Stop reason: HOSPADM

## 2022-11-27 ENCOUNTER — APPOINTMENT (OUTPATIENT)
Dept: ULTRASOUND IMAGING | Facility: HOSPITAL | Age: 27
End: 2022-11-27

## 2022-11-27 VITALS
WEIGHT: 200 LBS | OXYGEN SATURATION: 100 % | TEMPERATURE: 98.7 F | BODY MASS INDEX: 37.76 KG/M2 | DIASTOLIC BLOOD PRESSURE: 54 MMHG | SYSTOLIC BLOOD PRESSURE: 104 MMHG | HEIGHT: 61 IN | HEART RATE: 71 BPM | RESPIRATION RATE: 16 BRPM

## 2022-11-27 LAB
ABO GROUP BLD: NORMAL
ALBUMIN SERPL-MCNC: 3.9 G/DL (ref 3.5–5.2)
ALBUMIN/GLOB SERPL: 1.6 G/DL
ALP SERPL-CCNC: 67 U/L (ref 39–117)
ALT SERPL W P-5'-P-CCNC: 19 U/L (ref 1–33)
ANION GAP SERPL CALCULATED.3IONS-SCNC: 12 MMOL/L (ref 5–15)
APTT PPP: 28.9 SECONDS (ref 61–76.5)
AST SERPL-CCNC: 20 U/L (ref 1–32)
BACTERIA UR QL AUTO: ABNORMAL /HPF
BASOPHILS # BLD AUTO: 0.1 10*3/MM3 (ref 0–0.2)
BASOPHILS NFR BLD AUTO: 0.5 % (ref 0–1.5)
BILIRUB SERPL-MCNC: <0.2 MG/DL (ref 0–1.2)
BILIRUB UR QL STRIP: NEGATIVE
BLD GP AB SCN SERPL QL: NEGATIVE
BUN SERPL-MCNC: 9 MG/DL (ref 6–20)
BUN/CREAT SERPL: 17 (ref 7–25)
C TRACH RRNA CVX QL NAA+PROBE: NOT DETECTED
CALCIUM SPEC-SCNC: 9.1 MG/DL (ref 8.6–10.5)
CHLORIDE SERPL-SCNC: 101 MMOL/L (ref 98–107)
CLARITY UR: ABNORMAL
CLUE CELLS SPEC QL WET PREP: NORMAL
CO2 SERPL-SCNC: 24 MMOL/L (ref 22–29)
COLOR UR: ABNORMAL
CREAT SERPL-MCNC: 0.53 MG/DL (ref 0.57–1)
DEPRECATED RDW RBC AUTO: 40.7 FL (ref 37–54)
EGFRCR SERPLBLD CKD-EPI 2021: 131 ML/MIN/1.73
EOSINOPHIL # BLD AUTO: 0.1 10*3/MM3 (ref 0–0.4)
EOSINOPHIL NFR BLD AUTO: 0.7 % (ref 0.3–6.2)
ERYTHROCYTE [DISTWIDTH] IN BLOOD BY AUTOMATED COUNT: 12.6 % (ref 12.3–15.4)
GLOBULIN UR ELPH-MCNC: 2.5 GM/DL
GLUCOSE SERPL-MCNC: 113 MG/DL (ref 65–99)
GLUCOSE UR STRIP-MCNC: NEGATIVE MG/DL
HCG INTACT+B SERPL-ACNC: NORMAL MIU/ML
HCT VFR BLD AUTO: 34.2 % (ref 34–46.6)
HGB BLD-MCNC: 11.7 G/DL (ref 12–15.9)
HGB UR QL STRIP.AUTO: ABNORMAL
HYALINE CASTS UR QL AUTO: ABNORMAL /LPF
HYDATID CYST SPEC WET PREP: NORMAL
INR PPP: 1.02 (ref 0.93–1.1)
KETONES UR QL STRIP: NEGATIVE
LEUKOCYTE ESTERASE UR QL STRIP.AUTO: ABNORMAL
LYMPHOCYTES # BLD AUTO: 2.7 10*3/MM3 (ref 0.7–3.1)
LYMPHOCYTES NFR BLD AUTO: 15.6 % (ref 19.6–45.3)
MCH RBC QN AUTO: 29.9 PG (ref 26.6–33)
MCHC RBC AUTO-ENTMCNC: 34.1 G/DL (ref 31.5–35.7)
MCV RBC AUTO: 87.7 FL (ref 79–97)
MONOCYTES # BLD AUTO: 0.7 10*3/MM3 (ref 0.1–0.9)
MONOCYTES NFR BLD AUTO: 3.8 % (ref 5–12)
N GONORRHOEA RRNA SPEC QL NAA+PROBE: NOT DETECTED
NEUTROPHILS NFR BLD AUTO: 13.9 10*3/MM3 (ref 1.7–7)
NEUTROPHILS NFR BLD AUTO: 79.4 % (ref 42.7–76)
NITRITE UR QL STRIP: NEGATIVE
NRBC BLD AUTO-RTO: 0.1 /100 WBC (ref 0–0.2)
PH UR STRIP.AUTO: 6.5 [PH] (ref 5–8)
PLATELET # BLD AUTO: 279 10*3/MM3 (ref 140–450)
PMV BLD AUTO: 8.5 FL (ref 6–12)
POTASSIUM SERPL-SCNC: 3.7 MMOL/L (ref 3.5–5.2)
PROT SERPL-MCNC: 6.4 G/DL (ref 6–8.5)
PROT UR QL STRIP: ABNORMAL
PROTHROMBIN TIME: 10.5 SECONDS (ref 9.6–11.7)
RBC # BLD AUTO: 3.91 10*6/MM3 (ref 3.77–5.28)
RBC # UR STRIP: ABNORMAL /HPF
REF LAB TEST METHOD: ABNORMAL
RH BLD: POSITIVE
SODIUM SERPL-SCNC: 137 MMOL/L (ref 136–145)
SP GR UR STRIP: 1.02 (ref 1–1.03)
SQUAMOUS #/AREA URNS HPF: ABNORMAL /HPF
T VAGINALIS SPEC QL WET PREP: NORMAL
T&S EXPIRATION DATE: NORMAL
UROBILINOGEN UR QL STRIP: ABNORMAL
WBC # UR STRIP: ABNORMAL /HPF
WBC NRBC COR # BLD: 17.5 10*3/MM3 (ref 3.4–10.8)
WBC SPEC QL WET PREP: NORMAL
YEAST GENITAL QL WET PREP: NORMAL

## 2022-11-27 PROCEDURE — 25010000002 MORPHINE PER 10 MG: Performed by: EMERGENCY MEDICINE

## 2022-11-27 PROCEDURE — 76817 TRANSVAGINAL US OBSTETRIC: CPT

## 2022-11-27 PROCEDURE — 76815 OB US LIMITED FETUS(S): CPT

## 2022-11-27 PROCEDURE — 81001 URINALYSIS AUTO W/SCOPE: CPT | Performed by: EMERGENCY MEDICINE

## 2022-11-27 PROCEDURE — 85730 THROMBOPLASTIN TIME PARTIAL: CPT | Performed by: EMERGENCY MEDICINE

## 2022-11-27 PROCEDURE — 87210 SMEAR WET MOUNT SALINE/INK: CPT | Performed by: EMERGENCY MEDICINE

## 2022-11-27 PROCEDURE — 86901 BLOOD TYPING SEROLOGIC RH(D): CPT | Performed by: EMERGENCY MEDICINE

## 2022-11-27 PROCEDURE — 85610 PROTHROMBIN TIME: CPT | Performed by: EMERGENCY MEDICINE

## 2022-11-27 PROCEDURE — 25010000002 CEFTRIAXONE PER 250 MG: Performed by: EMERGENCY MEDICINE

## 2022-11-27 PROCEDURE — 93976 VASCULAR STUDY: CPT

## 2022-11-27 PROCEDURE — 36415 COLL VENOUS BLD VENIPUNCTURE: CPT | Performed by: EMERGENCY MEDICINE

## 2022-11-27 PROCEDURE — 85025 COMPLETE CBC W/AUTO DIFF WBC: CPT | Performed by: EMERGENCY MEDICINE

## 2022-11-27 PROCEDURE — 87186 SC STD MICRODIL/AGAR DIL: CPT | Performed by: EMERGENCY MEDICINE

## 2022-11-27 PROCEDURE — 96375 TX/PRO/DX INJ NEW DRUG ADDON: CPT

## 2022-11-27 PROCEDURE — 87086 URINE CULTURE/COLONY COUNT: CPT | Performed by: EMERGENCY MEDICINE

## 2022-11-27 PROCEDURE — 84702 CHORIONIC GONADOTROPIN TEST: CPT | Performed by: EMERGENCY MEDICINE

## 2022-11-27 PROCEDURE — 96365 THER/PROPH/DIAG IV INF INIT: CPT

## 2022-11-27 PROCEDURE — 86850 RBC ANTIBODY SCREEN: CPT | Performed by: EMERGENCY MEDICINE

## 2022-11-27 PROCEDURE — 87591 N.GONORRHOEAE DNA AMP PROB: CPT | Performed by: EMERGENCY MEDICINE

## 2022-11-27 PROCEDURE — 86900 BLOOD TYPING SEROLOGIC ABO: CPT | Performed by: EMERGENCY MEDICINE

## 2022-11-27 PROCEDURE — 87077 CULTURE AEROBIC IDENTIFY: CPT | Performed by: EMERGENCY MEDICINE

## 2022-11-27 PROCEDURE — 87491 CHLMYD TRACH DNA AMP PROBE: CPT | Performed by: EMERGENCY MEDICINE

## 2022-11-27 PROCEDURE — 80053 COMPREHEN METABOLIC PANEL: CPT | Performed by: EMERGENCY MEDICINE

## 2022-11-27 RX ORDER — PNV NO.95/FERROUS FUM/FOLIC AC 28MG-0.8MG
1 TABLET ORAL DAILY
Qty: 30 TABLET | Refills: 0 | Status: SHIPPED | OUTPATIENT
Start: 2022-11-27

## 2022-11-27 RX ORDER — CEFDINIR 300 MG/1
300 CAPSULE ORAL ONCE
Status: DISCONTINUED | OUTPATIENT
Start: 2022-11-27 | End: 2022-11-27

## 2022-11-27 RX ORDER — CEFDINIR 300 MG/1
300 CAPSULE ORAL 2 TIMES DAILY
Qty: 14 CAPSULE | Refills: 0 | Status: SHIPPED | OUTPATIENT
Start: 2022-11-27

## 2022-11-27 RX ADMIN — MORPHINE SULFATE 4 MG: 4 INJECTION INTRAVENOUS at 00:21

## 2022-11-27 RX ADMIN — CEFTRIAXONE 1 G: 1 INJECTION, POWDER, FOR SOLUTION INTRAMUSCULAR; INTRAVENOUS at 04:47

## 2022-11-28 LAB — BACTERIA SPEC AEROBE CULT: ABNORMAL

## 2023-01-11 ENCOUNTER — TELEPHONE (OUTPATIENT)
Dept: FAMILY MEDICINE CLINIC | Facility: CLINIC | Age: 28
End: 2023-01-11

## 2023-01-11 NOTE — TELEPHONE ENCOUNTER
Caller: Reema Schumacher    Relationship: Self    Best call back number: [phone]    What form or medical record are you requesting: MEDICAL RECORDS     Who is requesting this form or medical record from you: [facility]IAL [facility]ITY OFFICE     How would you like to receive the form or medical records (pick-up, mail, fax): PICK-UP     Timeframe paperwork needed: ASAP     PLEASE CALL PATIENT WHEN PAPERWORK IS READY FOR PICK-UP

## 2023-01-11 NOTE — TELEPHONE ENCOUNTER
"Attempted to contact pt to gather more details. No answer; automated message of \"the call cannot be completed at this time\". Will attempt again.    OKAY FOR HUB TO READ/SHARE  "

## 2023-03-14 ENCOUNTER — TELEPHONE (OUTPATIENT)
Dept: FAMILY MEDICINE CLINIC | Facility: CLINIC | Age: 28
End: 2023-03-14
Payer: COMMERCIAL

## 2023-03-14 NOTE — TELEPHONE ENCOUNTER
Attempted to contact patient in regard to missed appointment yesterday. Immediately went to , in which I asked her to call the office back. Mailing letter about no-show.

## 2023-04-11 ENCOUNTER — TELEPHONE (OUTPATIENT)
Dept: FAMILY MEDICINE CLINIC | Facility: CLINIC | Age: 28
End: 2023-04-11

## 2023-04-11 NOTE — TELEPHONE ENCOUNTER
Caller: MARTINA    Relationship to patient: Other    Best call back number: [phone]    Patient is needing:   PATIENT IS CURRENTLY ADMITTED TO [facility] FOR DEPRESSION     MARTINA CALLED TO MAKE [employer] CABRERA AWARE

## 2023-08-10 ENCOUNTER — TELEPHONE (OUTPATIENT)
Dept: FAMILY MEDICINE CLINIC | Facility: CLINIC | Age: 28
End: 2023-08-10
Payer: MEDICAID

## 2023-08-10 NOTE — TELEPHONE ENCOUNTER
Caller: Reema Schumacher    Relationship: Self    Best call back number: [phone]     What was the call regarding: PATIENT STATED THAT SHE IS INTERESTED IN STARTING VIVITROL SHOTS. PLEASE ADVISE.

## 2023-09-11 ENCOUNTER — OFFICE VISIT (OUTPATIENT)
Dept: FAMILY MEDICINE CLINIC | Facility: CLINIC | Age: 28
End: 2023-09-11
Payer: MEDICAID

## 2023-09-11 VITALS
WEIGHT: 228 LBS | HEIGHT: 61 IN | SYSTOLIC BLOOD PRESSURE: 118 MMHG | HEART RATE: 67 BPM | OXYGEN SATURATION: 99 % | BODY MASS INDEX: 43.05 KG/M2 | DIASTOLIC BLOOD PRESSURE: 70 MMHG

## 2023-09-11 DIAGNOSIS — Z23 NEED FOR VACCINATION AGAINST STREPTOCOCCUS PNEUMONIAE: Primary | ICD-10-CM

## 2023-09-11 DIAGNOSIS — E66.01 CLASS 3 SEVERE OBESITY WITH SERIOUS COMORBIDITY AND BODY MASS INDEX (BMI) OF 40.0 TO 44.9 IN ADULT, UNSPECIFIED OBESITY TYPE: ICD-10-CM

## 2023-09-11 DIAGNOSIS — F10.11 HISTORY OF ALCOHOL ABUSE: ICD-10-CM

## 2023-09-11 DIAGNOSIS — Z00.00 PREVENTATIVE HEALTH CARE: ICD-10-CM

## 2023-09-11 DIAGNOSIS — Z87.898 HISTORY OF SUBSTANCE USE DISORDER: ICD-10-CM

## 2023-09-11 DIAGNOSIS — Z90.3 H/O GASTRIC SLEEVE: ICD-10-CM

## 2023-09-11 DIAGNOSIS — N60.81 SEBACEOUS CYST OF BREAST, RIGHT: ICD-10-CM

## 2023-09-11 PROBLEM — E66.813 CLASS 3 SEVERE OBESITY WITH SERIOUS COMORBIDITY AND BODY MASS INDEX (BMI) OF 40.0 TO 44.9 IN ADULT: Status: ACTIVE | Noted: 2023-09-11

## 2023-09-11 LAB
DEPRECATED RDW RBC AUTO: 44.1 FL (ref 37–54)
ERYTHROCYTE [DISTWIDTH] IN BLOOD BY AUTOMATED COUNT: 14.1 % (ref 12.3–15.4)
HCT VFR BLD AUTO: 39.7 % (ref 34–46.6)
HGB BLD-MCNC: 12.8 G/DL (ref 12–15.9)
MCH RBC QN AUTO: 27.5 PG (ref 26.6–33)
MCHC RBC AUTO-ENTMCNC: 32.2 G/DL (ref 31.5–35.7)
MCV RBC AUTO: 85.2 FL (ref 79–97)
PLATELET # BLD AUTO: 313 10*3/MM3 (ref 140–450)
PMV BLD AUTO: 11.2 FL (ref 6–12)
RBC # BLD AUTO: 4.66 10*6/MM3 (ref 3.77–5.28)
WBC NRBC COR # BLD: 9.03 10*3/MM3 (ref 3.4–10.8)

## 2023-09-11 PROCEDURE — 80307 DRUG TEST PRSMV CHEM ANLYZR: CPT | Performed by: NURSE PRACTITIONER

## 2023-09-11 PROCEDURE — 83036 HEMOGLOBIN GLYCOSYLATED A1C: CPT | Performed by: NURSE PRACTITIONER

## 2023-09-11 PROCEDURE — 80061 LIPID PANEL: CPT | Performed by: NURSE PRACTITIONER

## 2023-09-11 PROCEDURE — 80050 GENERAL HEALTH PANEL: CPT | Performed by: NURSE PRACTITIONER

## 2023-09-11 RX ORDER — OMEPRAZOLE 20 MG/1
20 CAPSULE, DELAYED RELEASE ORAL DAILY
COMMUNITY

## 2023-09-11 NOTE — PROGRESS NOTES
Chief Complaint  Alcohol Problem (Would like to discuss vivitrol injection for possible treatment ), Abscess (Boils in the lower abdomen, groin area and under breast. She has some boils and scarring. Would like possible referral to dermatology. Also, has cyst on chest that comes and goes wondering if general surgery needs to look at this. ), and Obesity (Would like to discuss something for weight loss)    Subjective        Reema Schumacher presents to [facility]  History of Present Illness    Patient presents with multiple complaints.  PMH includes bipolar disorder, depression with anxiety, GERD, hypertension, obesity and substance abuse.    Patient has had inpatient treatment at [facility] in the past (July 2022), history of alcohol, marijuana, cocaine, ecstasy abuse.  Patient uses marijuana a few time weekly. She has been otherwise sober since July 2022.She is prescribed Effexor 75 mg daily and Rexulti 1 mg daily for bipolar depression.  Patient has a sponsor, going to AA meetings regularly and is in talk therapy - followed by [facility] in [place]. Patient reports even through all coping modalities - urge to drink increasing, specifically after having baby. Patient also works in [employer]. She is wanting to start Vivitrol injections.     Patient is c/o recurrent boils to chest and lower abdominal area. She reports previously advised of sebaceous cysts - previously referred to Derm/surgery but never scheduled appointment. Area to chest is now hard to touch,tender to touch.     Patient is also concerned about obesity. She reports VSG in 2021 for weight loss. Patient is post partum, currently has 10 month old. Patient reports able to eat full meals, remains hungry, unable to lose weight. She is working on being active but is not exercising. Goal weight is #150s.     Objective   Vital Signs:  /70 (BP Location: Left arm, Patient Position: Sitting, Cuff Size: Large Adult)   Pulse  "67   Ht 154.9 cm (61\")   Wt 103 kg (228 lb)   SpO2 99%   BMI 43.08 kg/m²   Estimated body mass index is 43.08 kg/m² as calculated from the following:    Height as of this encounter: 154.9 cm (61\").    Weight as of this encounter: 103 kg (228 lb).             Physical Exam  Constitutional:       Appearance: Normal appearance. She is obese.   HENT:      Head: Normocephalic.   Cardiovascular:      Rate and Rhythm: Normal rate and regular rhythm.   Pulmonary:      Effort: Pulmonary effort is normal.      Breath sounds: Normal breath sounds.   Abdominal:      General: Abdomen is flat. Bowel sounds are normal.      Palpations: Abdomen is soft.   Musculoskeletal:         General: Normal range of motion.      Cervical back: Neck supple.      Right lower leg: No edema.      Left lower leg: No edema.   Skin:     General: Skin is warm and dry.   Neurological:      Mental Status: She is alert and oriented to person, place, and time.      Gait: Gait is intact.   Psychiatric:         Attention and Perception: Attention normal.         Mood and Affect: Mood normal.         Speech: Speech normal.      Result Review :                   Assessment and Plan   Diagnoses and all orders for this visit:    1. Need for vaccination against Streptococcus pneumoniae (Primary)  -     Pneumococcal Conjugate Vaccine 20-Valent (PCV20)    2. Sebaceous cyst of breast, right  -     Ambulatory Referral to Dermatology    3. Class 3 severe obesity with serious comorbidity and body mass index (BMI) of 40.0 to 44.9 in adult, unspecified obesity type  Assessment & Plan:  Patient's (Body mass index is 43.08 kg/m².) indicates that they are morbidly/severely obese (BMI > 40 or > 35 with obesity - related health condition) with health conditions that include GERD . Weight is worsening. BMI  is above average; BMI management plan is completed. We discussed portion control, increasing exercise, management of depression/anxiety/stress to control compensatory " eating, and pharmacologic options including discussed treatment options . Labs today then can discuss further with PCP.      Orders:  -     CBC (No Diff)  -     Comprehensive Metabolic Panel  -     Hemoglobin A1c  -     Lipid Panel  -     TSH    4. H/O gastric sleeve    5. Preventative health care  -     CBC (No Diff)  -     Comprehensive Metabolic Panel  -     Hemoglobin A1c  -     Lipid Panel  -     TSH    6. History of substance use disorder  -     Urine Drug Screen - Urine, Clean Catch    7. History of alcohol abuse  Assessment & Plan:  Urine drug screen  CBC/CMP today  Consider Vivitrol injections             I spent 30 minutes caring for Reema on this date of service. This time includes time spent by me in the following activities:preparing for the visit, reviewing tests, obtaining and/or reviewing a separately obtained history, performing a medically appropriate examination and/or evaluation , counseling and educating the patient/family/caregiver, ordering medications, tests, or procedures, referring and communicating with other health care professionals , documenting information in the medical record, and care coordination  Follow Up   Return in about 4 weeks (around 10/9/2023) for Obesity - with Lori.  Patient was given instructions and counseling regarding her condition or for health maintenance advice. Please see specific information pulled into the AVS if appropriate.          Suicidal ideation with plan/means

## 2023-09-11 NOTE — PROGRESS NOTES
Venipuncture Blood Specimen Collection  Venipuncture performed in the left arm by Lucie Otero MA with good hemostasis. Patient tolerated the procedure well without complications.   09/11/23   Lucie Otero MA

## 2023-09-11 NOTE — ASSESSMENT & PLAN NOTE
Patient's (Body mass index is 43.08 kg/m².) indicates that they are morbidly/severely obese (BMI > 40 or > 35 with obesity - related health condition) with health conditions that include GERD . Weight is worsening. BMI  is above average; BMI management plan is completed. We discussed portion control, increasing exercise, management of depression/anxiety/stress to control compensatory eating, and pharmacologic options including discussed treatment options . Labs today then can discuss further with PCP.

## 2023-09-12 LAB
ALBUMIN SERPL-MCNC: 4.8 G/DL (ref 3.5–5.2)
ALBUMIN/GLOB SERPL: 1.7 G/DL
ALP SERPL-CCNC: 86 U/L (ref 39–117)
ALT SERPL W P-5'-P-CCNC: 21 U/L (ref 1–33)
AMPHET+METHAMPHET UR QL: NEGATIVE
ANION GAP SERPL CALCULATED.3IONS-SCNC: 12 MMOL/L (ref 5–15)
AST SERPL-CCNC: 16 U/L (ref 1–32)
BARBITURATES UR QL SCN: NEGATIVE
BENZODIAZ UR QL SCN: NEGATIVE
BILIRUB SERPL-MCNC: 0.2 MG/DL (ref 0–1.2)
BUN SERPL-MCNC: 8 MG/DL (ref 6–20)
BUN/CREAT SERPL: 12.3 (ref 7–25)
CALCIUM SPEC-SCNC: 10 MG/DL (ref 8.6–10.5)
CANNABINOIDS SERPL QL: POSITIVE
CHLORIDE SERPL-SCNC: 103 MMOL/L (ref 98–107)
CHOLEST SERPL-MCNC: 214 MG/DL (ref 0–200)
CO2 SERPL-SCNC: 27 MMOL/L (ref 22–29)
COCAINE UR QL: NEGATIVE
CREAT SERPL-MCNC: 0.65 MG/DL (ref 0.57–1)
EGFRCR SERPLBLD CKD-EPI 2021: 123.9 ML/MIN/1.73
FENTANYL UR-MCNC: NEGATIVE NG/ML
GLOBULIN UR ELPH-MCNC: 2.8 GM/DL
GLUCOSE SERPL-MCNC: 78 MG/DL (ref 65–99)
HBA1C MFR BLD: 5.1 % (ref 4.8–5.6)
HDLC SERPL-MCNC: 55 MG/DL (ref 40–60)
LDLC SERPL CALC-MCNC: 137 MG/DL (ref 0–100)
LDLC/HDLC SERPL: 2.45 {RATIO}
METHADONE UR QL SCN: NEGATIVE
OPIATES UR QL: NEGATIVE
OXYCODONE UR QL SCN: NEGATIVE
POTASSIUM SERPL-SCNC: 4.5 MMOL/L (ref 3.5–5.2)
PROT SERPL-MCNC: 7.6 G/DL (ref 6–8.5)
SODIUM SERPL-SCNC: 142 MMOL/L (ref 136–145)
TRIGL SERPL-MCNC: 121 MG/DL (ref 0–150)
TSH SERPL DL<=0.05 MIU/L-ACNC: 0.89 UIU/ML (ref 0.27–4.2)
VLDLC SERPL-MCNC: 22 MG/DL (ref 5–40)

## 2023-09-12 NOTE — PROGRESS NOTES
Patient came in yesterday to discuss Vivitrol injections and medication for weight loss.  She had VSG surgery in 2021 - gaining weight. I advised we would start with labs and I would discuss plan of care with you.  Follow-up as scheduled on 11/14 - I can get both started if okay with you.

## 2023-09-13 NOTE — PROGRESS NOTES
Please let patient know that total cholesterol is higher than it was last year.  It is recommended to limit fried and fatty foods.  The rest of her labs are normal.  Options for weight loss medication include phentermine, Contrave for oral medications and Wegovy for injectables.  Phentermine cannot be taken for more than 3 months at a time.  Wegovy is designed to be a long-term treatment.  All options are effective.  Most common side effects with Wegovy is nausea and constipation.  It is typically not covered with medication although she is welcome to check.  It can be compounded through [facility] pharmacy take, $99 per vial.  She can let me know how she wants to proceed.    As far as Vivitrol, we can go ahead and get that set up.  Once medication is received, we can start injections.    She should not breastfeed with these medications.

## 2023-10-03 ENCOUNTER — OFFICE VISIT (OUTPATIENT)
Dept: FAMILY MEDICINE CLINIC | Facility: CLINIC | Age: 28
End: 2023-10-03
Payer: MEDICAID

## 2023-10-03 VITALS
BODY MASS INDEX: 44.75 KG/M2 | SYSTOLIC BLOOD PRESSURE: 124 MMHG | HEART RATE: 84 BPM | HEIGHT: 61 IN | WEIGHT: 237 LBS | DIASTOLIC BLOOD PRESSURE: 60 MMHG | OXYGEN SATURATION: 98 %

## 2023-10-03 DIAGNOSIS — E66.01 CLASS 3 SEVERE OBESITY WITH SERIOUS COMORBIDITY AND BODY MASS INDEX (BMI) OF 40.0 TO 44.9 IN ADULT, UNSPECIFIED OBESITY TYPE: Primary | ICD-10-CM

## 2023-10-03 DIAGNOSIS — F10.11 HISTORY OF ALCOHOL ABUSE: ICD-10-CM

## 2023-10-03 DIAGNOSIS — Z87.898 HISTORY OF SUBSTANCE USE DISORDER: ICD-10-CM

## 2023-10-03 PROCEDURE — 3074F SYST BP LT 130 MM HG: CPT | Performed by: NURSE PRACTITIONER

## 2023-10-03 PROCEDURE — 1160F RVW MEDS BY RX/DR IN RCRD: CPT | Performed by: NURSE PRACTITIONER

## 2023-10-03 PROCEDURE — 3078F DIAST BP <80 MM HG: CPT | Performed by: NURSE PRACTITIONER

## 2023-10-03 PROCEDURE — 99214 OFFICE O/P EST MOD 30 MIN: CPT | Performed by: NURSE PRACTITIONER

## 2023-10-03 PROCEDURE — 1159F MED LIST DOCD IN RCRD: CPT | Performed by: NURSE PRACTITIONER

## 2023-10-03 RX ORDER — PHENTERMINE HYDROCHLORIDE 37.5 MG/1
37.5 CAPSULE ORAL EVERY MORNING
Qty: 30 CAPSULE | Refills: 2 | Status: SHIPPED | OUTPATIENT
Start: 2023-10-03

## 2023-10-03 NOTE — PROGRESS NOTES
Chief Complaint  Chief Complaint   Patient presents with    Obesity     Discuss medication for weight loss            Subjective          Reema Schumacher presents to [facility] for   History of Present Illness      Patient presents for obesity, she has a history of gastric sleeve .  She was seen on 2023, labs obtained and all essentially normal except elevated lipids. Her goal weight is 150#.  She is postpartum, child is 11 months old. She quit smoking cigs, cut out soda, has started taking meals to work, not eating pizza (she works at a [employer]) states she is always hungry, she has become more active, on her feet all day work, but not exercising much, has been unable to lose weight.     She has a history of drug/alcohol abuse including cocaine, ecstasy and marijuana.  [facility] .  She has been sober since except occasional marijuana use.  At visit 2023 she reported increasing cravings to drink alcohol especially after having a baby. She was ordered to start Vivitrol injections, her phone has been out of service, she is planning to contact the specialty pharmacy to check status today.       The following portions of the patient's history were reviewed and updated as appropriate: allergies, current medications, past family history, past medical history, past social history, past surgical history and problem list.    Past Medical History:   Diagnosis Date    ADHD     Bipolar 1 disorder 2022    Depression with anxiety     Family history of diabetes mellitus (DM)     Family history of heart disease     GERD (gastroesophageal reflux disease)     Hypertension     Obesity     PONV (postoperative nausea and vomiting)     Vitamin D deficiency      Past Surgical History:   Procedure Laterality Date     SECTION  2016    ENDOSCOPY N/A 2020    Procedure: ESOPHAGOGASTRODUODENOSCOPY WITH BIOPSY X2 AREAS;  Surgeon:  Kenney Hodgson MD;  Location: [facility] ENDOSCOPY;  Service: Gastroenterology;  Laterality: N/A;  duodenitis, duodenal ulcers, gastric ulcers    ENDOSCOPY N/A 2021    Procedure: ESOPHAGOGASTRODUODENOSCOPY WITH BIOPSY X2 AREAS;  Surgeon: Kenney Hodgson MD;  Location: [facility] ENDOSCOPY;  Service: Gastroenterology;  Laterality: N/A;  duodenitis and gastric ulcers, ESOPHAGITIS    GASTRIC SLEEVE LAPAROSCOPIC N/A 2021    Procedure: GASTRIC SLEEVE LAPAROSCOPIC WITH DAVINCI ROBOT;  Surgeon: Yesenia Chirinos MD;  Location: [facility] MAIN OR;  Service: Robotics - DaVinci;  Laterality: N/A;    PLANTAR'S WART EXCISION Bilateral     WISDOM TOOTH EXTRACTION       Family History   Problem Relation Age of Onset    Hypertension Mother     Obesity Mother     Heart disease Mother     Crohn's disease Mother     Heart attack Father     Stroke Father     Diabetes Father      Social History     Tobacco Use    Smoking status: Former     Packs/day: 0.50     Years: 6.00     Pack years: 3.00     Types: Cigarettes, Electronic Cigarette     Start date:      Quit date:      Years since quittin.7    Smokeless tobacco: Never   Substance Use Topics    Alcohol use: Not Currently     Alcohol/week: 0.0 standard drinks     Comment: QUIT       Current Outpatient Medications:     Naltrexone (VIVITROL) 380 MG reconstituted suspension IM suspension, Inject 380 mg into the appropriate muscle as directed by prescriber Every 28 (Twenty-Eight) Days for 90 days., Disp: 1 each, Rfl: 2    omeprazole (priLOSEC) 20 MG capsule, Take 1 capsule by mouth Daily., Disp: , Rfl:     Rexulti 1 MG tablet, Take 1 mg by mouth Daily., Disp: 30 tablet, Rfl: 0    venlafaxine XR (EFFEXOR-XR) 75 MG 24 hr capsule, Take 1 capsule by mouth Daily., Disp: 30 capsule, Rfl: 0    phentermine 37.5 MG capsule, Take 1 capsule by mouth Every Morning., Disp: 30 capsule, Rfl: 2    Objective   Vital Signs:   /60 (BP Location: Left arm, Patient Position: Sitting, Cuff Size:  "Large Adult)   Pulse 84   Ht 154.9 cm (61\")   Wt 108 kg (237 lb)   SpO2 98%   BMI 44.78 kg/m²           Physical Exam  Constitutional:       General: She is not in acute distress.     Appearance: Normal appearance. She is well-developed. She is obese. She is not ill-appearing or diaphoretic.   HENT:      Head: Normocephalic.   Eyes:      Conjunctiva/sclera: Conjunctivae normal.      Pupils: Pupils are equal, round, and reactive to light.   Neck:      Thyroid: No thyromegaly.      Vascular: No JVD.   Cardiovascular:      Rate and Rhythm: Normal rate and regular rhythm.      Heart sounds: Normal heart sounds. No murmur heard.  Pulmonary:      Effort: Pulmonary effort is normal. No respiratory distress.      Breath sounds: Normal breath sounds. No wheezing or rhonchi.   Abdominal:      General: Bowel sounds are normal. There is no distension.      Palpations: Abdomen is soft.      Tenderness: There is no abdominal tenderness.   Musculoskeletal:         General: No swelling or tenderness. Normal range of motion.      Cervical back: Normal range of motion and neck supple. No tenderness.   Lymphadenopathy:      Cervical: No cervical adenopathy.   Skin:     General: Skin is warm and dry.      Coloration: Skin is not jaundiced.      Findings: No erythema or rash.   Neurological:      General: No focal deficit present.      Mental Status: She is alert and oriented to person, place, and time. Mental status is at baseline.      Sensory: No sensory deficit.   Psychiatric:         Mood and Affect: Mood normal.         Behavior: Behavior normal.         Thought Content: Thought content normal.         Judgment: Judgment normal.        Result Review :     No visits with results within 7 Day(s) from this visit.   Latest known visit with results is:   Office Visit on 09/11/2023   Component Date Value Ref Range Status    WBC 09/11/2023 9.03  3.40 - 10.80 10*3/mm3 Final    RBC 09/11/2023 4.66  3.77 - 5.28 10*6/mm3 Final    " Hemoglobin 09/11/2023 12.8  12.0 - 15.9 g/dL Final    Hematocrit 09/11/2023 39.7  34.0 - 46.6 % Final    MCV 09/11/2023 85.2  79.0 - 97.0 fL Final    MCH 09/11/2023 27.5  26.6 - 33.0 pg Final    MCHC 09/11/2023 32.2  31.5 - 35.7 g/dL Final    RDW 09/11/2023 14.1  12.3 - 15.4 % Final    RDW-SD 09/11/2023 44.1  37.0 - 54.0 fl Final    MPV 09/11/2023 11.2  6.0 - 12.0 fL Final    Platelets 09/11/2023 313  140 - 450 10*3/mm3 Final    Glucose 09/11/2023 78  65 - 99 mg/dL Final    BUN 09/11/2023 8  6 - 20 mg/dL Final    Creatinine 09/11/2023 0.65  0.57 - 1.00 mg/dL Final    Sodium 09/11/2023 142  136 - 145 mmol/L Final    Potassium 09/11/2023 4.5  3.5 - 5.2 mmol/L Final    Chloride 09/11/2023 103  98 - 107 mmol/L Final    CO2 09/11/2023 27.0  22.0 - 29.0 mmol/L Final    Calcium 09/11/2023 10.0  8.6 - 10.5 mg/dL Final    Total Protein 09/11/2023 7.6  6.0 - 8.5 g/dL Final    Albumin 09/11/2023 4.8  3.5 - 5.2 g/dL Final    ALT (SGPT) 09/11/2023 21  1 - 33 U/L Final    AST (SGOT) 09/11/2023 16  1 - 32 U/L Final    Alkaline Phosphatase 09/11/2023 86  39 - 117 U/L Final    Total Bilirubin 09/11/2023 0.2  0.0 - 1.2 mg/dL Final    Globulin 09/11/2023 2.8  gm/dL Final    A/G Ratio 09/11/2023 1.7  g/dL Final    BUN/Creatinine Ratio 09/11/2023 12.3  7.0 - 25.0 Final    Anion Gap 09/11/2023 12.0  5.0 - 15.0 mmol/L Final    eGFR 09/11/2023 123.9  >60.0 mL/min/1.73 Final    Hemoglobin A1C 09/11/2023 5.10  4.80 - 5.60 % Final    Total Cholesterol 09/11/2023 214 (H)  0 - 200 mg/dL Final    Triglycerides 09/11/2023 121  0 - 150 mg/dL Final    HDL Cholesterol 09/11/2023 55  40 - 60 mg/dL Final    LDL Cholesterol  09/11/2023 137 (H)  0 - 100 mg/dL Final    VLDL Cholesterol 09/11/2023 22  5 - 40 mg/dL Final    LDL/HDL Ratio 09/11/2023 2.45   Final    TSH 09/11/2023 0.887  0.270 - 4.200 uIU/mL Final    Amphet/Methamphet, Screen 09/11/2023 Negative  Negative Final    Barbiturates Screen, Urine 09/11/2023 Negative  Negative Final     Benzodiazepine Screen, Urine 09/11/2023 Negative  Negative Final    Cocaine Screen, Urine 09/11/2023 Negative  Negative Final    Opiate Screen 09/11/2023 Negative  Negative Final    THC, Screen, Urine 09/11/2023 Positive (A)  Negative Final    Methadone Screen, Urine 09/11/2023 Negative  Negative Final    Oxycodone Screen, Urine 09/11/2023 Negative  Negative Final    Fentanyl, Urine 09/11/2023 Negative  Negative Final                              Assessment and Plan    Diagnoses and all orders for this visit:    1. Class 3 severe obesity with serious comorbidity and body mass index (BMI) of 40.0 to 44.9 in adult, unspecified obesity type (Primary)  Comments:  rec decrease calories to <1600/day  decrease portion size  start trial of phentermine  Orders:  -     phentermine 37.5 MG capsule; Take 1 capsule by mouth Every Morning.  Dispense: 30 capsule; Refill: 2    2. History of substance use disorder  Comments:  UDS reviewed in line with + THC.   ok to start Vivitrol once received from specialty pharmacy    3. History of alcohol abuse  Comments:  Patient provided specialty pharmacy telephone number, she will call today to check status of Vivitrol  Bring Vivitrol to office for injection once received    Other orders  -     Cancel: Fluzone >6 Months ([id number])      Pt will get flu shot at pharmacy       I spent 30 minutes caring for Reema Schumacher on this date of service. This time includes time spent by me in the following activities: preparing for the visit, reviewing tests, performing a medically appropriate examination and/or evaluation , counseling and educating the patient/family/caregiver, ordering medications, tests, or procedures and documenting information in the medical record        Follow Up     Return in about 3 months (around 1/3/2024) for weight check, obesity.  Patient was given instructions and counseling regarding her condition or for health maintenance advice. Please see specific information pulled  into the AVS if appropriate.        Part of this note may be an electronic transcription/translation of spoken language to printed text using the Dragon Dictation System

## 2023-11-06 ENCOUNTER — TELEPHONE (OUTPATIENT)
Dept: FAMILY MEDICINE CLINIC | Facility: CLINIC | Age: 28
End: 2023-11-06
Payer: MEDICAID

## 2023-11-06 NOTE — TELEPHONE ENCOUNTER
Pt came into the office today wanting to get started on depo shot for a form of birth control. She said that she talked to you briefly about this at her last appointment. She was going to get this at her gyn, but that office has closed and she said it would be easier for her to start getting it here. She asked if we could call this in for her so she can come back in for her first injection. Please advise.

## 2023-11-14 ENCOUNTER — OFFICE VISIT (OUTPATIENT)
Dept: FAMILY MEDICINE CLINIC | Facility: CLINIC | Age: 28
End: 2023-11-14
Payer: MEDICAID

## 2023-11-14 VITALS
OXYGEN SATURATION: 98 % | DIASTOLIC BLOOD PRESSURE: 70 MMHG | HEIGHT: 61 IN | HEART RATE: 84 BPM | BODY MASS INDEX: 44.29 KG/M2 | SYSTOLIC BLOOD PRESSURE: 110 MMHG | WEIGHT: 234.6 LBS | TEMPERATURE: 99.1 F | RESPIRATION RATE: 18 BRPM

## 2023-11-14 DIAGNOSIS — E66.01 CLASS 3 SEVERE OBESITY DUE TO EXCESS CALORIES WITH SERIOUS COMORBIDITY AND BODY MASS INDEX (BMI) OF 40.0 TO 44.9 IN ADULT: Primary | ICD-10-CM

## 2023-11-14 DIAGNOSIS — R31.9 URINARY TRACT INFECTION WITH HEMATURIA, SITE UNSPECIFIED: ICD-10-CM

## 2023-11-14 DIAGNOSIS — Z87.898 HISTORY OF SUBSTANCE USE DISORDER: ICD-10-CM

## 2023-11-14 DIAGNOSIS — F10.11 HISTORY OF ALCOHOL ABUSE: ICD-10-CM

## 2023-11-14 DIAGNOSIS — N39.0 URINARY TRACT INFECTION WITH HEMATURIA, SITE UNSPECIFIED: ICD-10-CM

## 2023-11-14 LAB
BILIRUB BLD-MCNC: NEGATIVE MG/DL
CLARITY, POC: ABNORMAL
COLOR UR: ABNORMAL
GLUCOSE UR STRIP-MCNC: NEGATIVE MG/DL
KETONES UR QL: NEGATIVE
LEUKOCYTE EST, POC: ABNORMAL
NITRITE UR-MCNC: POSITIVE MG/ML
PH UR: 5.5 [PH] (ref 5–8)
PROT UR STRIP-MCNC: ABNORMAL MG/DL
RBC # UR STRIP: ABNORMAL /UL
SP GR UR: 1.02 (ref 1–1.03)
UROBILINOGEN UR QL: NORMAL

## 2023-11-14 PROCEDURE — 87086 URINE CULTURE/COLONY COUNT: CPT | Performed by: NURSE PRACTITIONER

## 2023-11-14 PROCEDURE — 87186 SC STD MICRODIL/AGAR DIL: CPT | Performed by: NURSE PRACTITIONER

## 2023-11-14 PROCEDURE — 87077 CULTURE AEROBIC IDENTIFY: CPT | Performed by: NURSE PRACTITIONER

## 2023-11-14 RX ORDER — CIPROFLOXACIN 250 MG/1
250 TABLET, FILM COATED ORAL 2 TIMES DAILY
Qty: 10 TABLET | Refills: 0 | Status: SHIPPED | OUTPATIENT
Start: 2023-11-14

## 2023-11-14 RX ORDER — VENLAFAXINE HYDROCHLORIDE 75 MG/1
75 CAPSULE, EXTENDED RELEASE ORAL DAILY
Qty: 30 CAPSULE | Refills: 1 | Status: SHIPPED | OUTPATIENT
Start: 2023-11-14

## 2023-11-14 RX ORDER — BREXPIPRAZOLE 1 MG/1
3 TABLET ORAL DAILY
Start: 2023-11-14

## 2023-11-14 NOTE — PROGRESS NOTES
Chief Complaint  Chief Complaint   Patient presents with    Obesity     Has seen a decrease in appetite. No weightloss    Urinary Frequency     Onset x5 days. Burning while urinating, vaginal itching.           Subjective          Reema Schumacher presents to [facility] for   History of Present Illness      Obesity, she is postpartum about 1 year, her goal weight is 150 pounds.  Patient was started on phentermine 1 month ago, she has decreased calories, she has noticed a decrease in her appetite has only lost 3 pounds since the last visit. Exercise is minimal d/t work and having her child.     She reports dysuria, vaginal itching and burning as mentioned in CC    Patient with history of substance abuse, she has been prescribed Vivitrol, she was provided the specialty pharmacy information at the last visit but has not contacted them to start medication.  She lost her job, does report she is having cravings but has not drank alcohol or used drugs marijuana      The following portions of the patient's history were reviewed and updated as appropriate: allergies, current medications, past family history, past medical history, past social history, past surgical history and problem list.    Past Medical History:   Diagnosis Date    ADHD     Bipolar 1 disorder 2022    Depression with anxiety     Family history of diabetes mellitus (DM)     Family history of heart disease     GERD (gastroesophageal reflux disease)     Hypertension     Obesity     PONV (postoperative nausea and vomiting)     Vitamin D deficiency      Past Surgical History:   Procedure Laterality Date     SECTION  2016    ENDOSCOPY N/A 2020    Procedure: ESOPHAGOGASTRODUODENOSCOPY WITH BIOPSY X2 AREAS;  Surgeon: Kenney Hodgson MD;  Location: [facility] ENDOSCOPY;  Service: Gastroenterology;  Laterality: N/A;  duodenitis, duodenal ulcers, gastric ulcers    ENDOSCOPY N/A 2021    Procedure:  ESOPHAGOGASTRODUODENOSCOPY WITH BIOPSY X2 AREAS;  Surgeon: Kenney Hodgson MD;  Location: [facility] ENDOSCOPY;  Service: Gastroenterology;  Laterality: N/A;  duodenitis and gastric ulcers, ESOPHAGITIS    GASTRIC SLEEVE LAPAROSCOPIC N/A 2021    Procedure: GASTRIC SLEEVE LAPAROSCOPIC WITH DAVINCI ROBOT;  Surgeon: Yesenia Chirinos MD;  Location: [facility] MAIN OR;  Service: Robotics - DaVinci;  Laterality: N/A;    PLANTAR'S WART EXCISION Bilateral     WISDOM TOOTH EXTRACTION       Family History   Problem Relation Age of Onset    Hypertension Mother     Obesity Mother     Heart disease Mother     Crohn's disease Mother     Heart attack Father     Stroke Father     Diabetes Father      Social History     Tobacco Use    Smoking status: Former     Packs/day: 0.50     Years: 6.00     Additional pack years: 0.00     Total pack years: 3.00     Types: Cigarettes, Electronic Cigarette     Start date:      Quit date:      Years since quittin.8    Smokeless tobacco: Never   Substance Use Topics    Alcohol use: Not Currently     Alcohol/week: 0.0 standard drinks of alcohol     Comment: QUIT       Current Outpatient Medications:     omeprazole (priLOSEC) 20 MG capsule, Take 1 capsule by mouth Daily., Disp: , Rfl:     phentermine 37.5 MG capsule, Take 1 capsule by mouth Every Morning., Disp: 30 capsule, Rfl: 2    Rexulti 1 MG tablet, Take 3 mg by mouth Daily. Per psychiatry, Disp: , Rfl:     venlafaxine XR (EFFEXOR-XR) 75 MG 24 hr capsule, Take 1 capsule by mouth Daily., Disp: 30 capsule, Rfl: 1    ciprofloxacin (Cipro) 250 MG tablet, Take 1 tablet by mouth 2 (Two) Times a Day., Disp: 10 tablet, Rfl: 0    Naltrexone (VIVITROL) 380 MG reconstituted suspension IM suspension, Inject 380 mg into the appropriate muscle as directed by prescriber Every 28 (Twenty-Eight) Days for 90 days. (Patient not taking: Reported on 2023), Disp: 1 each, Rfl: 2    Objective   Vital Signs:   /70 (BP Location: Left arm, Patient  "Position: Sitting, Cuff Size: Large Adult)   Pulse 84   Temp 99.1 °F (37.3 °C) (Oral)   Resp 18   Ht 154.9 cm (61\")   Wt 106 kg (234 lb 9.6 oz)   SpO2 98% Comment: Room air  BMI 44.33 kg/m²           Physical Exam  Vitals and nursing note reviewed.   Constitutional:       General: She is not in acute distress.     Appearance: Normal appearance. She is well-developed. She is obese. She is not diaphoretic.   Neck:      Thyroid: No thyromegaly.      Vascular: No JVD.   Abdominal:      Tenderness: There is no right CVA tenderness or left CVA tenderness.   Musculoskeletal:         General: No tenderness. Normal range of motion.   Skin:     General: Skin is warm and dry.   Neurological:      Mental Status: She is alert and oriented to person, place, and time.      Sensory: No sensory deficit.   Psychiatric:         Behavior: Behavior normal.         Thought Content: Thought content normal.         Judgment: Judgment normal.          Result Review :     Office Visit on 11/14/2023   Component Date Value Ref Range Status    Color 11/14/2023 Dark Yellow  Yellow, Straw, Dark Yellow, Minal Final    Clarity, UA 11/14/2023 Cloudy (A)  Clear Final    Glucose, UA 11/14/2023 Negative  Negative mg/dL Final    Bilirubin 11/14/2023 Negative  Negative Final    Ketones, UA 11/14/2023 Negative  Negative Final    Specific Gravity  11/14/2023 1.025  1.005 - 1.030 Final    Blood, UA 11/14/2023 3+ (A)  Negative Final    pH, Urine 11/14/2023 5.5  5.0 - 8.0 Final    Protein, POC 11/14/2023 500 mg/dL (A)  Negative mg/dL Final    Urobilinogen, UA 11/14/2023 Normal  Normal, 0.2 E.U./dL Final    Leukocytes 11/14/2023 Large (3+) (A)  Negative Final    Nitrite, UA 11/14/2023 Positive (A)  Negative Final                              Assessment and Plan    Diagnoses and all orders for this visit:    1. Class 3 severe obesity due to excess calories with serious comorbidity and body mass index (BMI) of 40.0 to 44.9 in adult (Primary)    2. History " of alcohol abuse    3. History of substance use disorder    4. Urinary tract infection with hematuria, site unspecified  -     POC Urinalysis Dipstick  -     Urine Culture - Urine, Urine, Clean Catch    Other orders  -     venlafaxine XR (EFFEXOR-XR) 75 MG 24 hr capsule; Take 1 capsule by mouth Daily.  Dispense: 30 capsule; Refill: 1  -     Rexulti 1 MG tablet; Take 3 mg by mouth Daily. Per psychiatry  -     ciprofloxacin (Cipro) 250 MG tablet; Take 1 tablet by mouth 2 (Two) Times a Day.  Dispense: 10 tablet; Refill: 0      -Continue current medication regimen  -Continue Effexor and Rexulti per psychiatry  -Phentermine has decreased appetite with minimal weight loss, continue decreased caloric intake of less than 1600/day, increase exercise to at least 150 minutes/week.   -discussed with patient she needs to contact the specialty pharmacy to get Vivitrol and bring to office for injections  -Urine dip positive today, start Cipro, increase water intake      I spent 30 minutes caring for Reema Schumacher on this date of service. This time includes time spent by me in the following activities: preparing for the visit, reviewing tests, performing a medically appropriate examination and/or evaluation , counseling and educating the patient/family/caregiver, ordering medications, tests, or procedures and documenting information in the medical record        Follow Up     Return in 8 weeks (on 1/9/2024) for Annual physical, Recheck weight.  Patient was given instructions and counseling regarding her condition or for health maintenance advice. Please see specific information pulled into the AVS if appropriate.        Part of this note may be an electronic transcription/translation of spoken language to printed text using the Dragon Dictation System

## 2023-11-17 LAB — BACTERIA SPEC AEROBE CULT: ABNORMAL

## 2023-12-04 ENCOUNTER — TELEPHONE (OUTPATIENT)
Dept: FAMILY MEDICINE CLINIC | Facility: CLINIC | Age: 28
End: 2023-12-04
Payer: MEDICAID

## 2023-12-04 NOTE — TELEPHONE ENCOUNTER
Pt called into office r/t Depo shot. Pt called pharmacy who stated they do not have a prescription. Please advise

## 2023-12-05 RX ORDER — MEDROXYPROGESTERONE ACETATE 150 MG/ML
150 INJECTION, SUSPENSION INTRAMUSCULAR
Qty: 1 ML | Refills: 3 | Status: SHIPPED | OUTPATIENT
Start: 2023-12-05

## 2023-12-05 NOTE — TELEPHONE ENCOUNTER
[facility] states her last pap done during pregnancy less than a year and was done at [facility]. Have requested records

## 2023-12-06 ENCOUNTER — CLINICAL SUPPORT (OUTPATIENT)
Dept: FAMILY MEDICINE CLINIC | Facility: CLINIC | Age: 28
End: 2023-12-06
Payer: MEDICAID

## 2023-12-06 DIAGNOSIS — Z78.9 USES DEPOT MEDROXYPROGESTERONE ACETATE AS PRIMARY BIRTH CONTROL METHOD: Primary | ICD-10-CM

## 2023-12-06 LAB
B-HCG UR QL: NEGATIVE
EXPIRATION DATE: NORMAL
INTERNAL NEGATIVE CONTROL: NEGATIVE
INTERNAL POSITIVE CONTROL: POSITIVE
Lab: NORMAL

## 2023-12-06 RX ORDER — MEDROXYPROGESTERONE ACETATE 150 MG/ML
150 INJECTION, SUSPENSION INTRAMUSCULAR ONCE
Status: COMPLETED | OUTPATIENT
Start: 2023-12-06 | End: 2023-12-06

## 2023-12-06 RX ADMIN — MEDROXYPROGESTERONE ACETATE 150 MG: 150 INJECTION, SUSPENSION INTRAMUSCULAR at 16:24

## 2023-12-27 ENCOUNTER — OFFICE VISIT (OUTPATIENT)
Dept: FAMILY MEDICINE CLINIC | Facility: CLINIC | Age: 28
End: 2023-12-27
Payer: MEDICAID

## 2023-12-27 VITALS
OXYGEN SATURATION: 98 % | SYSTOLIC BLOOD PRESSURE: 117 MMHG | HEART RATE: 99 BPM | WEIGHT: 234 LBS | RESPIRATION RATE: 16 BRPM | HEIGHT: 61 IN | BODY MASS INDEX: 44.18 KG/M2 | TEMPERATURE: 99.1 F | DIASTOLIC BLOOD PRESSURE: 80 MMHG

## 2023-12-27 DIAGNOSIS — F41.8 MIXED ANXIETY DEPRESSIVE DISORDER: ICD-10-CM

## 2023-12-27 DIAGNOSIS — E66.01 CLASS 3 SEVERE OBESITY DUE TO EXCESS CALORIES WITH SERIOUS COMORBIDITY AND BODY MASS INDEX (BMI) OF 40.0 TO 44.9 IN ADULT: ICD-10-CM

## 2023-12-27 DIAGNOSIS — Z87.898 HISTORY OF SUBSTANCE USE DISORDER: ICD-10-CM

## 2023-12-27 DIAGNOSIS — Z86.19 H/O COLD SORES: ICD-10-CM

## 2023-12-27 DIAGNOSIS — Z00.00 PREVENTATIVE HEALTH CARE: Primary | ICD-10-CM

## 2023-12-27 DIAGNOSIS — K21.9 GASTROESOPHAGEAL REFLUX DISEASE WITHOUT ESOPHAGITIS: ICD-10-CM

## 2023-12-27 DIAGNOSIS — Z78.9 USES DEPOT MEDROXYPROGESTERONE ACETATE AS PRIMARY BIRTH CONTROL METHOD: ICD-10-CM

## 2023-12-27 DIAGNOSIS — Z90.3 H/O GASTRIC SLEEVE: ICD-10-CM

## 2023-12-27 RX ORDER — SEMAGLUTIDE 0.25 MG/.5ML
0.25 INJECTION, SOLUTION SUBCUTANEOUS WEEKLY
Qty: 2 ML | Refills: 0 | Status: SHIPPED | OUTPATIENT
Start: 2023-12-27

## 2023-12-27 RX ORDER — OMEPRAZOLE 40 MG/1
40 CAPSULE, DELAYED RELEASE ORAL DAILY
Qty: 30 CAPSULE | Refills: 5 | Status: SHIPPED | OUTPATIENT
Start: 2023-12-27

## 2023-12-27 RX ORDER — VALACYCLOVIR HYDROCHLORIDE 1 G/1
1000 TABLET, FILM COATED ORAL 2 TIMES DAILY
Qty: 30 TABLET | Refills: 2 | Status: SHIPPED | OUTPATIENT
Start: 2023-12-27 | End: 2024-01-01

## 2023-12-27 NOTE — PROGRESS NOTES
Chief Complaint  Chief Complaint   Patient presents with    Annual Exam     Also 3m FU Obesity & psychiatrist discussion re: vyvanse medication.    Mouth Lesions     Not at this time but would like a medication to help inflammation for cold sores.           Subjective          Reema Schumacher presents to [facility] for   History of Present Illness    Patient presents for annual exam with complaints of cold sores intermittently, she took Valtrex in the past, was effective.     Obesity, with hx of gastric sleeve, she is currently on phentermine with no weight loss since her last visit. States it is not curving her appetite.      Patient has history of alcoholism/substance abuse, she was prescribed Vivitrol but never started it, reports she is doing well without cravings, going to AA meetings. She is following with psychiatry now, depression and anxiety controlled on Effexor and Rexulti    GERD, stable on OTC medication, but due to cost is requesting a prescription.  Denies nausea, vomiting, constipation, abdominal pain and diarrhea.        The following portions of the patient's history were reviewed and updated as appropriate: allergies, current medications, past family history, past medical history, past social history, past surgical history and problem list.    Past Medical History:   Diagnosis Date    ADHD     Bipolar 1 disorder 2022    Depression with anxiety     Family history of diabetes mellitus (DM)     Family history of heart disease     GERD (gastroesophageal reflux disease)     Hypertension     Obesity     PONV (postoperative nausea and vomiting)     Vitamin D deficiency      Past Surgical History:   Procedure Laterality Date     SECTION  2016    ENDOSCOPY N/A 2020    Procedure: ESOPHAGOGASTRODUODENOSCOPY WITH BIOPSY X2 AREAS;  Surgeon: Kenney Hodgson MD;  Location: [facility] ENDOSCOPY;  Service: Gastroenterology;  Laterality: N/A;  duodenitis, duodenal  ulcers, gastric ulcers    ENDOSCOPY N/A 2021    Procedure: ESOPHAGOGASTRODUODENOSCOPY WITH BIOPSY X2 AREAS;  Surgeon: Kenney Hodgson MD;  Location: [facility] ENDOSCOPY;  Service: Gastroenterology;  Laterality: N/A;  duodenitis and gastric ulcers, ESOPHAGITIS    GASTRIC SLEEVE LAPAROSCOPIC N/A 2021    Procedure: GASTRIC SLEEVE LAPAROSCOPIC WITH DAVINCI ROBOT;  Surgeon: Yesenia Chirinos MD;  Location: [facility] MAIN OR;  Service: Robotics - DaVinci;  Laterality: N/A;    PLANTAR'S WART EXCISION Bilateral     WISDOM TOOTH EXTRACTION       Family History   Problem Relation Age of Onset    Hypertension Mother     Obesity Mother     Heart disease Mother     Crohn's disease Mother     Heart attack Father     Stroke Father     Diabetes Father      Social History     Tobacco Use    Smoking status: Former     Packs/day: 0.50     Years: 6.00     Additional pack years: 0.00     Total pack years: 3.00     Types: Cigarettes, Electronic Cigarette     Start date:      Quit date:      Years since quittin.9    Smokeless tobacco: Never   Substance Use Topics    Alcohol use: Not Currently     Alcohol/week: 0.0 standard drinks of alcohol     Comment: QUIT       Current Outpatient Medications:     medroxyPROGESTERone (Depo-Provera) 150 MG/ML injection, Inject 1 mL into the appropriate muscle as directed by prescriber Every 3 (Three) Months., Disp: 1 mL, Rfl: 3    Rexulti 1 MG tablet, Take 3 mg by mouth Daily. Per psychiatry, Disp: , Rfl:     venlafaxine XR (EFFEXOR-XR) 75 MG 24 hr capsule, Take 1 capsule by mouth Daily., Disp: 30 capsule, Rfl: 1    omeprazole (priLOSEC) 40 MG capsule, Take 1 capsule by mouth Daily., Disp: 30 capsule, Rfl: 5    Semaglutide-Weight Management (Wegovy) 0.25 MG/0.5ML solution auto-injector, Inject 0.25 mg under the skin into the appropriate area as directed 1 (One) Time Per Week., Disp: 2 mL, Rfl: 0    valACYclovir (Valtrex) 1000 MG tablet, Take 1 tablet by mouth 2 (Two) Times a Day for 5 days.  "When needed for cold sores, Disp: 30 tablet, Rfl: 2    Objective   Vital Signs:   /80 (BP Location: Left arm, Patient Position: Sitting, Cuff Size: Large Adult)   Pulse 99   Temp 99.1 °F (37.3 °C) (Temporal)   Resp 16   Ht 154.9 cm (61\")   Wt 106 kg (234 lb)   SpO2 98%   BMI 44.21 kg/m²           Physical Exam  Constitutional:       General: She is not in acute distress.     Appearance: Normal appearance. She is well-developed. She is obese. She is not ill-appearing or diaphoretic.   HENT:      Head: Normocephalic.   Eyes:      Conjunctiva/sclera: Conjunctivae normal.      Pupils: Pupils are equal, round, and reactive to light.   Neck:      Thyroid: No thyromegaly.      Vascular: No JVD.   Cardiovascular:      Rate and Rhythm: Normal rate and regular rhythm.      Heart sounds: Normal heart sounds. No murmur heard.  Pulmonary:      Effort: Pulmonary effort is normal. No respiratory distress.      Breath sounds: Normal breath sounds. No wheezing or rhonchi.   Abdominal:      General: Bowel sounds are normal. There is no distension.      Palpations: Abdomen is soft.      Tenderness: There is no abdominal tenderness.   Musculoskeletal:         General: No swelling or tenderness. Normal range of motion.      Cervical back: Normal range of motion and neck supple. No tenderness.   Lymphadenopathy:      Cervical: No cervical adenopathy.   Skin:     General: Skin is warm and dry.      Coloration: Skin is not jaundiced.      Findings: No erythema or rash.   Neurological:      General: No focal deficit present.      Mental Status: She is alert and oriented to person, place, and time. Mental status is at baseline.      Sensory: No sensory deficit.      Motor: No weakness.      Gait: Gait normal.   Psychiatric:         Mood and Affect: Mood normal.         Behavior: Behavior normal.         Thought Content: Thought content normal.         Judgment: Judgment normal.          Result Review :     No visits with results " within 7 Day(s) from this visit.   Latest known visit with results is:   Clinical Support on 12/06/2023   Component Date Value Ref Range Status    HCG, Urine, QL 12/06/2023 Negative  Negative Final    Lot Number 12/06/2023 [id number]   Final    Internal Positive Control 12/06/2023 Positive  Positive, Passed Final    Internal Negative Control 12/06/2023 Negative  Negative, Passed Final    Expiration Date 12/06/2023 12/14/2024   Final                              Assessment and Plan    Diagnoses and all orders for this visit:    1. Preventative health care (Primary)    2. Class 3 severe obesity due to excess calories with serious comorbidity and body mass index (BMI) of 40.0 to 44.9 in adult  Comments:  no weight loss with phentermine will d/c  trial wegovy, call in 3-4 weeks to titrate dose  Orders:  -     Semaglutide-Weight Management (Wegovy) 0.25 MG/0.5ML solution auto-injector; Inject 0.25 mg under the skin into the appropriate area as directed 1 (One) Time Per Week.  Dispense: 2 mL; Refill: 0    3. History of substance use disorder  Comments:  doing well without cravings, okay to remain off of Vivitrol  Continue going to AA meetings    4. Uses depot medroxyprogesterone acetate as primary birth control method  Comments:  Pap up-to-date per GYN dec 2022  ok to continue depo q3mo    5. H/O cold sores  Comments:  provided Valtrex to use as needed    6. Mixed anxiety depressive disorder  Comments:  Continue with psychiatry  Continue Effexor and Rexulti  Recommend patient to discuss ADHD/Vyvanse with psychiatry    7. H/O gastric sleeve    8. Gastroesophageal reflux disease without esophagitis  Comments:  Stable, continue and refill omeprazole    Other orders  -     omeprazole (priLOSEC) 40 MG capsule; Take 1 capsule by mouth Daily.  Dispense: 30 capsule; Refill: 5  -     valACYclovir (Valtrex) 1000 MG tablet; Take 1 tablet by mouth 2 (Two) Times a Day for 5 days. When needed for cold sores  Dispense: 30 tablet; Refill:  2    Age appropriate preventative counseling provided, including healthy lifestyle modifications and exercise      I spent 30 minutes caring for Reema Schumacher on this date of service. This time includes time spent by me in the following activities: preparing for the visit, reviewing tests, performing a medically appropriate examination and/or evaluation , counseling and educating the patient/family/caregiver, ordering medications, tests, or procedures and documenting information in the medical record        Follow Up     Return in about 3 months (around 3/27/2024) for Recheck weight check.  Patient was given instructions and counseling regarding her condition or for health maintenance advice. Please see specific information pulled into the AVS if appropriate.        Part of this note may be an electronic transcription/translation of spoken language to printed text using the Dragon Dictation System

## 2023-12-27 NOTE — TELEPHONE ENCOUNTER
ERROR   Patient went to [facility] on 12/24 for increased frequency.  Her UA was unremarkable, but she was put on Macrobid.  She called today as she still has c/o frequency.   brought in a clean urine sample on ice.  UA obtained today was also unremarkable.  No cx will be sent.  Advised to stop taking the Macrobid.  Asked  to ensure patient feels the stimulation as she has a [employer]tronic Neurostimulator.  Asked that they turn up the amplitude and monitor her frequency for the next two days.  They will call and follow up with Sarah CRAMER for further instructions if no change in frequency.

## 2024-01-02 ENCOUNTER — PRIOR AUTHORIZATION (OUTPATIENT)
Dept: FAMILY MEDICINE CLINIC | Facility: CLINIC | Age: 29
End: 2024-01-02
Payer: MEDICAID

## 2024-03-05 DIAGNOSIS — Z78.9 USES DEPOT MEDROXYPROGESTERONE ACETATE AS PRIMARY BIRTH CONTROL METHOD: Primary | ICD-10-CM

## 2024-03-06 RX ORDER — MEDROXYPROGESTERONE ACETATE 150 MG/ML
150 INJECTION, SUSPENSION INTRAMUSCULAR
Qty: 1 ML | Refills: 3 | Status: SHIPPED | OUTPATIENT
Start: 2024-03-06

## 2024-03-13 ENCOUNTER — OFFICE VISIT (OUTPATIENT)
Dept: FAMILY MEDICINE CLINIC | Facility: CLINIC | Age: 29
End: 2024-03-13
Payer: MEDICAID

## 2024-03-13 VITALS
SYSTOLIC BLOOD PRESSURE: 126 MMHG | BODY MASS INDEX: 44.93 KG/M2 | DIASTOLIC BLOOD PRESSURE: 83 MMHG | HEIGHT: 61 IN | WEIGHT: 238 LBS | OXYGEN SATURATION: 100 % | HEART RATE: 87 BPM | RESPIRATION RATE: 18 BRPM | TEMPERATURE: 98.3 F

## 2024-03-13 DIAGNOSIS — Z11.3 SCREENING FOR STD (SEXUALLY TRANSMITTED DISEASE): Primary | ICD-10-CM

## 2024-03-13 DIAGNOSIS — Z78.9 USES DEPOT MEDROXYPROGESTERONE ACETATE AS PRIMARY BIRTH CONTROL METHOD: ICD-10-CM

## 2024-03-13 DIAGNOSIS — R30.0 DYSURIA: ICD-10-CM

## 2024-03-13 DIAGNOSIS — N89.8 VAGINAL ITCHING: ICD-10-CM

## 2024-03-13 DIAGNOSIS — K21.00 GASTROESOPHAGEAL REFLUX DISEASE WITH ESOPHAGITIS WITHOUT HEMORRHAGE: ICD-10-CM

## 2024-03-13 LAB
B-HCG UR QL: NEGATIVE
BASOPHILS # BLD AUTO: 0.1 10*3/MM3 (ref 0–0.2)
BASOPHILS NFR BLD AUTO: 0.8 % (ref 0–1.5)
BILIRUB BLD-MCNC: ABNORMAL MG/DL
C TRACH RRNA CVX QL NAA+PROBE: NOT DETECTED
CLARITY, POC: ABNORMAL
COLOR UR: ABNORMAL
DEPRECATED RDW RBC AUTO: 39.8 FL (ref 37–54)
EOSINOPHIL # BLD AUTO: 0.19 10*3/MM3 (ref 0–0.4)
EOSINOPHIL NFR BLD AUTO: 1.6 % (ref 0.3–6.2)
ERYTHROCYTE [DISTWIDTH] IN BLOOD BY AUTOMATED COUNT: 13.2 % (ref 12.3–15.4)
EXPIRATION DATE: NORMAL
GLUCOSE UR STRIP-MCNC: NEGATIVE MG/DL
HBA1C MFR BLD: 5 % (ref 4.8–5.6)
HCT VFR BLD AUTO: 38 % (ref 34–46.6)
HGB BLD-MCNC: 12.3 G/DL (ref 12–15.9)
IMM GRANULOCYTES # BLD AUTO: 0.06 10*3/MM3 (ref 0–0.05)
IMM GRANULOCYTES NFR BLD AUTO: 0.5 % (ref 0–0.5)
INTERNAL NEGATIVE CONTROL: NORMAL
INTERNAL POSITIVE CONTROL: NORMAL
KETONES UR QL: NEGATIVE
LEUKOCYTE EST, POC: NEGATIVE
LYMPHOCYTES # BLD AUTO: 3.21 10*3/MM3 (ref 0.7–3.1)
LYMPHOCYTES NFR BLD AUTO: 26.3 % (ref 19.6–45.3)
Lab: NORMAL
MCH RBC QN AUTO: 27 PG (ref 26.6–33)
MCHC RBC AUTO-ENTMCNC: 32.4 G/DL (ref 31.5–35.7)
MCV RBC AUTO: 83.3 FL (ref 79–97)
MONOCYTES # BLD AUTO: 0.64 10*3/MM3 (ref 0.1–0.9)
MONOCYTES NFR BLD AUTO: 5.2 % (ref 5–12)
N GONORRHOEA RRNA SPEC QL NAA+PROBE: NOT DETECTED
NEUTROPHILS NFR BLD AUTO: 65.6 % (ref 42.7–76)
NEUTROPHILS NFR BLD AUTO: 8.02 10*3/MM3 (ref 1.7–7)
NITRITE UR-MCNC: NEGATIVE MG/ML
NRBC BLD AUTO-RTO: 0 /100 WBC (ref 0–0.2)
PH UR: 5.5 [PH] (ref 5–8)
PLATELET # BLD AUTO: 342 10*3/MM3 (ref 140–450)
PMV BLD AUTO: 10.9 FL (ref 6–12)
PROT UR STRIP-MCNC: ABNORMAL MG/DL
RBC # BLD AUTO: 4.56 10*6/MM3 (ref 3.77–5.28)
RBC # UR STRIP: ABNORMAL /UL
SP GR UR: 1.03 (ref 1–1.03)
TRICHOMONAS VAGINALIS PCR: NOT DETECTED
UROBILINOGEN UR QL: NORMAL
WBC NRBC COR # BLD AUTO: 12.22 10*3/MM3 (ref 3.4–10.8)

## 2024-03-13 PROCEDURE — G0432 EIA HIV-1/HIV-2 SCREEN: HCPCS | Performed by: NURSE PRACTITIONER

## 2024-03-13 PROCEDURE — 83036 HEMOGLOBIN GLYCOSYLATED A1C: CPT | Performed by: NURSE PRACTITIONER

## 2024-03-13 PROCEDURE — 86695 HERPES SIMPLEX TYPE 1 TEST: CPT | Performed by: NURSE PRACTITIONER

## 2024-03-13 PROCEDURE — 87591 N.GONORRHOEAE DNA AMP PROB: CPT | Performed by: NURSE PRACTITIONER

## 2024-03-13 PROCEDURE — 81001 URINALYSIS AUTO W/SCOPE: CPT | Performed by: NURSE PRACTITIONER

## 2024-03-13 PROCEDURE — 87491 CHLMYD TRACH DNA AMP PROBE: CPT | Performed by: NURSE PRACTITIONER

## 2024-03-13 PROCEDURE — 87661 TRICHOMONAS VAGINALIS AMPLIF: CPT | Performed by: NURSE PRACTITIONER

## 2024-03-13 PROCEDURE — 80048 BASIC METABOLIC PNL TOTAL CA: CPT | Performed by: NURSE PRACTITIONER

## 2024-03-13 PROCEDURE — 85025 COMPLETE CBC W/AUTO DIFF WBC: CPT | Performed by: NURSE PRACTITIONER

## 2024-03-13 PROCEDURE — 86696 HERPES SIMPLEX TYPE 2 TEST: CPT | Performed by: NURSE PRACTITIONER

## 2024-03-13 PROCEDURE — 80074 ACUTE HEPATITIS PANEL: CPT | Performed by: NURSE PRACTITIONER

## 2024-03-13 PROCEDURE — 86592 SYPHILIS TEST NON-TREP QUAL: CPT | Performed by: NURSE PRACTITIONER

## 2024-03-13 RX ORDER — SUCRALFATE 1 G/1
1 TABLET ORAL 3 TIMES DAILY
Qty: 90 TABLET | Refills: 0 | Status: SHIPPED | OUTPATIENT
Start: 2024-03-13

## 2024-03-13 RX ORDER — FLUCONAZOLE 150 MG/1
150 TABLET ORAL ONCE
Qty: 1 TABLET | Refills: 1 | Status: SHIPPED | OUTPATIENT
Start: 2024-03-13 | End: 2024-03-13

## 2024-03-13 RX ORDER — MEDROXYPROGESTERONE ACETATE 150 MG/ML
150 INJECTION, SUSPENSION INTRAMUSCULAR ONCE
Status: COMPLETED | OUTPATIENT
Start: 2024-03-13 | End: 2024-03-13

## 2024-03-13 RX ORDER — CARIPRAZINE 1.5 MG/1
1.5 CAPSULE, GELATIN COATED ORAL DAILY
COMMUNITY
Start: 2024-03-05

## 2024-03-13 RX ADMIN — MEDROXYPROGESTERONE ACETATE 150 MG: 150 INJECTION, SUSPENSION INTRAMUSCULAR at 09:04

## 2024-03-13 NOTE — PROGRESS NOTES
Chief Complaint  Chief Complaint   Patient presents with    Contraception     New partner 2023, would like STD testing , has had on and off UTI since . Would like the Depo shot.           Subjective          Reema Schumacher presents to [facility] for   History of Present Illness    Pt presents today with concern for possible STD.  She reports having a new sexual partner about 2 months ago, was one time. Since she has been treated for UTI, reports intermittent vaginal itching, dysuria, vaginal discharge is white however with foul smell at times. She taken a pregnancy test and negative, she is currently on her period, she is on depo-Provera, today will be shot #2.     GERD, she is on omeprazole, she has seen gastroenterology in the past and treated for ulcers.  She c/o GI upset, burning, irritation, her diet is poor, she drinks coffee and caffeinated drinks most of the day.  Reports symptoms are similar to when she previously had ulcers    Obesity, insurance did not cover Wegovy    The following portions of the patient's history were reviewed and updated as appropriate: allergies, current medications, past family history, past medical history, past social history, past surgical history and problem list.    Past Medical History:   Diagnosis Date    ADHD     Bipolar 1 disorder 2022    Depression with anxiety     Family history of diabetes mellitus (DM)     Family history of heart disease     GERD (gastroesophageal reflux disease)     Hypertension     Obesity     PONV (postoperative nausea and vomiting)     Vitamin D deficiency      Past Surgical History:   Procedure Laterality Date     SECTION  2016    ENDOSCOPY N/A 2020    Procedure: ESOPHAGOGASTRODUODENOSCOPY WITH BIOPSY X2 AREAS;  Surgeon: Kenney Hodgson MD;  Location: [facility] ENDOSCOPY;  Service: Gastroenterology;  Laterality: N/A;  duodenitis, duodenal ulcers, gastric ulcers    ENDOSCOPY N/A 2021     Procedure: ESOPHAGOGASTRODUODENOSCOPY WITH BIOPSY X2 AREAS;  Surgeon: Kenney Hodgson MD;  Location: [facility] ENDOSCOPY;  Service: Gastroenterology;  Laterality: N/A;  duodenitis and gastric ulcers, ESOPHAGITIS    GASTRIC SLEEVE LAPAROSCOPIC N/A 2021    Procedure: GASTRIC SLEEVE LAPAROSCOPIC WITH DAVINCI ROBOT;  Surgeon: Yesenia Chirinos MD;  Location: [facility] MAIN OR;  Service: Robotics - DaVinci;  Laterality: N/A;    PLANTAR'S WART EXCISION Bilateral     WISDOM TOOTH EXTRACTION       Family History   Problem Relation Age of Onset    Hypertension Mother     Obesity Mother     Heart disease Mother     Crohn's disease Mother     Heart attack Father     Stroke Father     Diabetes Father      Social History     Tobacco Use    Smoking status: Former     Current packs/day: 0.00     Average packs/day: 0.5 packs/day for 6.0 years (3.0 ttl pk-yrs)     Types: Cigarettes, Electronic Cigarette     Start date:      Quit date:      Years since quittin.2    Smokeless tobacco: Never   Substance Use Topics    Alcohol use: Not Currently     Alcohol/week: 0.0 standard drinks of alcohol     Comment: QUIT       Current Outpatient Medications:     medroxyPROGESTERone (Depo-Provera) 150 MG/ML injection, Inject 1 mL into the appropriate muscle as directed by prescriber Every 3 (Three) Months., Disp: 1 mL, Rfl: 3    omeprazole (priLOSEC) 40 MG capsule, Take 1 capsule by mouth Daily., Disp: 30 capsule, Rfl: 5    Vraylar 1.5 MG capsule capsule, Take 1 capsule by mouth Daily., Disp: , Rfl:     fluconazole (Diflucan) 150 MG tablet, Take 1 tablet by mouth 1 (One) Time for 1 dose. May repeat in 72 hours if needed, Disp: 1 tablet, Rfl: 1    sucralfate (Carafate) 1 g tablet, Take 1 tablet by mouth 3 times a day., Disp: 90 tablet, Rfl: 0  No current facility-administered medications for this visit.    Objective   Vital Signs:   /83 (BP Location: Right arm, Patient Position: Sitting, Cuff Size: Large Adult)   Pulse 87   Temp 98.3  "°F (36.8 °C) (Oral)   Resp 18   Ht 154.9 cm (61\")   Wt 108 kg (238 lb)   SpO2 100%   BMI 44.97 kg/m²           Physical Exam  Vitals and nursing note reviewed.   Constitutional:       General: She is not in acute distress.     Appearance: She is well-developed. She is obese. She is not diaphoretic.   Neck:      Thyroid: No thyromegaly.      Vascular: No JVD.   Musculoskeletal:         General: No tenderness. Normal range of motion.   Skin:     General: Skin is warm and dry.   Neurological:      Mental Status: She is alert and oriented to person, place, and time.      Sensory: No sensory deficit.      Motor: No weakness.      Gait: Gait normal.   Psychiatric:         Behavior: Behavior normal.         Thought Content: Thought content normal.         Judgment: Judgment normal.          Result Review :     Office Visit on 03/13/2024   Component Date Value Ref Range Status    Color 03/13/2024 Dark Yellow  Yellow, Straw, Dark Yellow, Minal Final    Clarity, UA 03/13/2024 Slightly Cloudy (A)  Clear Final    Glucose, UA 03/13/2024 Negative  Negative mg/dL Final    Bilirubin 03/13/2024 Small (1+) (A)  Negative Final    Ketones, UA 03/13/2024 Negative  Negative Final    Specific Gravity  03/13/2024 1.035 (A)  1.005 - 1.030 Final    Blood, UA 03/13/2024 Large (A)  Negative Final    pH, Urine 03/13/2024 5.5  5.0 - 8.0 Final    Protein, POC 03/13/2024 300 mg/dL (A)  Negative mg/dL Final    Urobilinogen, UA 03/13/2024 Normal  Normal, 0.2 E.U./dL Final    Leukocytes 03/13/2024 Negative  Negative Final    Nitrite, UA 03/13/2024 Negative  Negative Final    Chlamydia DNA by PCR 03/13/2024 Not Detected  Not Detected, Invalid Final    Neisseria gonorrhoeae by PCR 03/13/2024 Not Detected  Not Detected Final    Trichomonas vaginalis PCR 03/13/2024 Not Detected  Not Detected, Invalid Final    HCG, Urine, QL 03/13/2024 Negative  Negative Final    Lot Number 03/13/2024 [id number]   Final    Internal Positive Control 03/13/2024 Passed  " Positive, Passed Final    Internal Negative Control 03/13/2024 Passed  Negative, Passed Final    Expiration Date 03/13/2024 12/14/2024   Final                              Assessment and Plan    Diagnoses and all orders for this visit:    1. Screening for STD (sexually transmitted disease) (Primary)  -     CT/NG, TV, PCR - Urine, Urine, Clean Catch  -     HIV-1/O/2 ANTIGEN/ANTIBODY, 4TH GENERATION  -     HSV 1 and 2-Specific Ab, IgG  -     RPR  -     Hepatitis panel, acute  -     POC Urinalysis Dipstick  -     Urinalysis With Culture If Indicated - Urine, Clean Catch  -     CBC & Differential  -     Basic Metabolic Panel  -     Hemoglobin A1c  -     [place] Urine Culture Tube - Urine, Clean Catch    2. Dysuria  -     CT/NG, TV, PCR - Urine, Urine, Clean Catch  -     HIV-1/O/2 ANTIGEN/ANTIBODY, 4TH GENERATION  -     HSV 1 and 2-Specific Ab, IgG  -     RPR  -     Hepatitis panel, acute  -     POC Urinalysis Dipstick  -     Urinalysis With Culture If Indicated - Urine, Clean Catch  -     [place] Urine Culture Tube - Urine, Clean Catch    3. Vaginal itching  -     CT/NG, TV, PCR - Urine, Urine, Clean Catch  -     HIV-1/O/2 ANTIGEN/ANTIBODY, 4TH GENERATION  -     HSV 1 and 2-Specific Ab, IgG  -     RPR  -     Hepatitis panel, acute  -     POC Urinalysis Dipstick  -     Urinalysis With Culture If Indicated - Urine, Clean Catch  -     Hemoglobin A1c  -     [place] Urine Culture Tube - Urine, Clean Catch    4. Uses depot medroxyprogesterone acetate as primary birth control method  -     POCT pregnancy, urine  -     medroxyPROGESTERone Acetate suspension prefilled syringe 150 mg    5. Gastroesophageal reflux disease with esophagitis without hemorrhage    Other orders  -     fluconazole (Diflucan) 150 MG tablet; Take 1 tablet by mouth 1 (One) Time for 1 dose. May repeat in 72 hours if needed  Dispense: 1 tablet; Refill: 1  -     sucralfate (Carafate) 1 g tablet; Take 1 tablet by mouth 3 times a day.  Dispense: 90 tablet;  Refill: 0    Urine dip is negative with blood (on period), will send for UA CS and as ordered above  Ordered Diflucan  Will treat any other STD's if positive  Labs today as ordered  Rec decreasing sugars and carbohydrates in the diet  Increase water intake to 2 L/day, pH of urine 5.5 and dark/concentrated.   Upt negative, Depo shot today  Continue omeprazole, may take BID prn  Start carafate for 1mo, rec diet changes       I spent 30 minutes caring for Reema Schumacher on this date of service. This time includes time spent by me in the following activities: preparing for the visit, reviewing tests, performing a medically appropriate examination and/or evaluation , counseling and educating the patient/family/caregiver, ordering medications, tests, or procedures and documenting information in the medical record        Follow Up     Return in about 6 months (around 9/13/2024) for Recheck.  Patient was given instructions and counseling regarding her condition or for health maintenance advice. Please see specific information pulled into the AVS if appropriate.        Part of this note may be an electronic transcription/translation of spoken language to printed text using the Dragon Dictation System

## 2024-03-13 NOTE — PROGRESS NOTES
Venipuncture Blood Specimen Collection  Venipuncture performed in [facility] by Yasmin Desouza MA with good hemostasis. Patient tolerated the procedure well without complications.   03/13/24   Yasmin Desouza MA

## 2024-03-14 LAB
AMORPH URATE CRY URNS QL MICRO: ABNORMAL /HPF
ANION GAP SERPL CALCULATED.3IONS-SCNC: 12.8 MMOL/L (ref 5–15)
BACTERIA UR QL AUTO: ABNORMAL /HPF
BILIRUB UR QL STRIP: NEGATIVE
BUN SERPL-MCNC: 8 MG/DL (ref 6–20)
BUN/CREAT SERPL: 14.5 (ref 7–25)
CALCIUM SPEC-SCNC: 9.3 MG/DL (ref 8.6–10.5)
CHLORIDE SERPL-SCNC: 103 MMOL/L (ref 98–107)
CLARITY UR: ABNORMAL
CO2 SERPL-SCNC: 24.2 MMOL/L (ref 22–29)
COLOR UR: ABNORMAL
CREAT SERPL-MCNC: 0.55 MG/DL (ref 0.57–1)
EGFRCR SERPLBLD CKD-EPI 2021: 129 ML/MIN/1.73
GLUCOSE SERPL-MCNC: 76 MG/DL (ref 65–99)
GLUCOSE UR STRIP-MCNC: NEGATIVE MG/DL
HAV IGM SERPL QL IA: NORMAL
HBV CORE IGM SERPL QL IA: NORMAL
HBV SURFACE AG SERPL QL IA: NORMAL
HCV AB SER DONR QL: NORMAL
HGB UR QL STRIP.AUTO: ABNORMAL
HIV 1+2 AB+HIV1 P24 AG SERPL QL IA: NORMAL
HOLD SPECIMEN: NORMAL
HYALINE CASTS UR QL AUTO: ABNORMAL /LPF
KETONES UR QL STRIP: NEGATIVE
LEUKOCYTE ESTERASE UR QL STRIP.AUTO: ABNORMAL
MUCOUS THREADS URNS QL MICRO: ABNORMAL /HPF
NITRITE UR QL STRIP: NEGATIVE
PH UR STRIP.AUTO: 5.5 [PH] (ref 5–8)
POTASSIUM SERPL-SCNC: 3.9 MMOL/L (ref 3.5–5.2)
PROT UR QL STRIP: ABNORMAL
RBC # UR STRIP: ABNORMAL /HPF
REF LAB TEST METHOD: ABNORMAL
RPR SER QL: NORMAL
SODIUM SERPL-SCNC: 140 MMOL/L (ref 136–145)
SP GR UR STRIP: 1.03 (ref 1–1.03)
SQUAMOUS #/AREA URNS HPF: ABNORMAL /HPF
UROBILINOGEN UR QL STRIP: ABNORMAL
WBC # UR STRIP: ABNORMAL /HPF

## 2024-03-15 LAB
HSV1 IGG SER IA-ACNC: <0.91 INDEX (ref 0–0.9)
HSV2 IGG SER IA-ACNC: <0.91 INDEX (ref 0–0.9)

## 2024-06-05 ENCOUNTER — PRIOR AUTHORIZATION (OUTPATIENT)
Dept: FAMILY MEDICINE CLINIC | Facility: CLINIC | Age: 29
End: 2024-06-05
Payer: MEDICAID

## 2024-06-10 NOTE — TELEPHONE ENCOUNTER
Caller: Reema Schumacher    Relationship: Self    Best call back number:      Reema Schumacher (Self) [phone] (Mobile)         What was the call regarding:   UPDATE ON THE PRIOR AUTHORIZATION FOR OMEPRAZOLE     SHE IS ALSO NEEDING TO SCHEDULE HER DEPO SHOT AS WELL         Is it okay if the provider responds through [facility]:CALL PLEASE

## 2024-06-11 NOTE — TELEPHONE ENCOUNTER
Patient insurance is different and not letting me submit the PA she is going to call and get it fixed and give me a call back. Patient was also told she is allowed to stop by when she can and get the depo shot.

## 2024-06-28 NOTE — TELEPHONE ENCOUNTER
Caller: Reema Schumacher    Relationship: Self    Best call back number: [phone]    Requested Prescriptions:   Requested Prescriptions     Pending Prescriptions Disp Refills    omeprazole (priLOSEC) 40 MG capsule 30 capsule 5     Sig: Take 1 capsule by mouth Daily.    sucralfate (Carafate) 1 g tablet 90 tablet 0     Sig: Take 1 tablet by mouth 3 times a day.        Pharmacy where request should be sent: [facility] [id number] [facility] [address] - [phone] [facility] [phone] FX  [facility] [place] - [address] - [phone]  - [phone] FX     Last office visit with prescribing clinician: 3/13/2024   Last telemedicine visit with prescribing clinician: Visit date not found   Next office visit with prescribing clinician: 9/16/2024     Additional details provided by patient: OMEPRAZOLE WILL NEED A PA    Does the patient have less than a 3 day supply:   Yes  [] No    Would you like a call back once the refill request has been completed: [] Yes [x] No    If the office needs to give you a call back, can they leave a voicemail: [] Yes [x] No    Miguel Dean   06/28/24 16:11 EDT

## 2024-07-03 RX ORDER — OMEPRAZOLE 40 MG/1
40 CAPSULE, DELAYED RELEASE ORAL DAILY
Qty: 30 CAPSULE | Refills: 5 | Status: SHIPPED | OUTPATIENT
Start: 2024-07-03

## 2024-07-03 RX ORDER — SUCRALFATE 1 G/1
1 TABLET ORAL 3 TIMES DAILY
Qty: 90 TABLET | Refills: 0 | Status: SHIPPED | OUTPATIENT
Start: 2024-07-03

## 2024-07-10 ENCOUNTER — CLINICAL SUPPORT (OUTPATIENT)
Dept: FAMILY MEDICINE CLINIC | Facility: CLINIC | Age: 29
End: 2024-07-10
Payer: MEDICAID

## 2024-07-10 DIAGNOSIS — Z78.9 USES DEPOT MEDROXYPROGESTERONE ACETATE AS PRIMARY BIRTH CONTROL METHOD: Primary | ICD-10-CM

## 2024-07-10 PROCEDURE — 96372 THER/PROPH/DIAG INJ SC/IM: CPT | Performed by: NURSE PRACTITIONER

## 2024-07-10 RX ORDER — MEDROXYPROGESTERONE ACETATE 150 MG/ML
150 INJECTION, SUSPENSION INTRAMUSCULAR ONCE
Status: COMPLETED | OUTPATIENT
Start: 2024-07-10 | End: 2024-07-10

## 2024-07-10 RX ADMIN — MEDROXYPROGESTERONE ACETATE 150 MG: 150 INJECTION, SUSPENSION INTRAMUSCULAR at 13:47

## 2024-09-10 ENCOUNTER — OFFICE VISIT (OUTPATIENT)
Dept: PSYCHIATRY | Facility: CLINIC | Age: 29
End: 2024-09-10
Payer: MEDICAID

## 2024-09-10 DIAGNOSIS — F31.32 MODERATE BIPOLAR I DISORDER, MOST RECENT EPISODE DEPRESSED: Primary | Chronic | ICD-10-CM

## 2024-09-10 DIAGNOSIS — Z79.899 ENCOUNTER FOR LONG-TERM (CURRENT) USE OF OTHER MEDICATIONS: ICD-10-CM

## 2024-09-10 DIAGNOSIS — F90.0 ADHD, PREDOMINANTLY INATTENTIVE TYPE: Chronic | ICD-10-CM

## 2024-09-10 DIAGNOSIS — F41.1 GENERALIZED ANXIETY DISORDER: Chronic | ICD-10-CM

## 2024-09-10 PROCEDURE — 90792 PSYCH DIAG EVAL W/MED SRVCS: CPT | Performed by: PSYCHIATRY & NEUROLOGY

## 2024-09-10 PROCEDURE — 1160F RVW MEDS BY RX/DR IN RCRD: CPT | Performed by: PSYCHIATRY & NEUROLOGY

## 2024-09-10 PROCEDURE — 1159F MED LIST DOCD IN RCRD: CPT | Performed by: PSYCHIATRY & NEUROLOGY

## 2024-09-10 RX ORDER — HYDROXYZINE HYDROCHLORIDE 25 MG/1
25 TABLET, FILM COATED ORAL 3 TIMES DAILY PRN
Start: 2024-09-10

## 2024-09-10 RX ORDER — LISDEXAMFETAMINE DIMESYLATE 30 MG/1
30 CAPSULE ORAL EVERY MORNING
Qty: 30 CAPSULE | Refills: 0 | Status: SHIPPED | OUTPATIENT
Start: 2024-09-10

## 2024-09-10 NOTE — PROGRESS NOTES
Subjective   Reema Schumacher is a 29 y.o. female who presents today for initial evaluation     Chief Complaint:  anxiety, depression, decreased concentration     History of Present Illness:   Mood disturbances since 11-11 yo and she started self harming  No significant stressors at that time but at the age of 15-18 yo the pt realized she was braxton, was unable to come out, family did not support, they were very Jew, but accepted now   The pt was always on hyperactive side, with the age - more inattentive  She was on different meds in the past but compliance was sporadic    Depression 5/10, associated with decreased energy, increased self-isolation, profound guilt feelings, low self-esteem, poor motivations  Sleep - fragmented   Episodes of depression with alternating with episodes with persistently elevated mood  With increased impulsivity to the point that it affected her life,  The patient was quitting jobs frequently and looking for another job   Pressured speech, increased E, no need to sleep   Excited for life, not worries about everything   Spending sprees,   Decreased concentration   Those episodes lasted up to 1-2 weeks  Anxiety -  persistent worries that cause chest tightness   No panic attacks  Decreased concentration affected her job performance, causes increased anxiety and depression, affects her self-esteem  The patient denied any perceptual disturbances, she denied suicidal and homicidal ideations recently    The following portions of the patient's history were reviewed and updated as appropriate: allergies, current medications, past family history, past medical history, past social history, past surgical history and problem list.    PAST PSYCHIATRIC HISTORY  Axis I  Affective/Bipoloar Disorder, Anxiety/Panic Disorder  Nigel - few times, last time April 2023 2ry to self harm   2019 - binge drinking and at the end of the binge  cut wrists, required sutures   Axis II  Defer     PAST OUTPATIENT  TREATMENT  Diagnosis treated:  Affective Disorder, Anxiety/Panic Disorder  Treatment Type:  Psychotherapy -[facility] until 2024, now waiting for appt at [facility]  Medication Management at [facility]   Prior Psychiatric Medications:  As a teen - sertraline, lexapro - made feeling suicidal but she was sporadically complaint    - effexor - effective for few years   Rexulti - effective for some time  Qelbree and strattera - not effective  Adderall - side effects   Vyvanse - successful   Concerta - effective   Vraylar now     Support Groups:  AA, NA, [facility] program   Sequelae Of Mental Disorder:  job disruption, emotional distress      Interval History  Decreased concentration - worse     Side Effects  Denied       Past Medical History:  Past Medical History:   Diagnosis Date    ADHD     Bipolar 1 disorder 2022    Depression with anxiety     Family history of diabetes mellitus (DM)     Family history of heart disease     GERD (gastroesophageal reflux disease)     Hypertension     Obesity     PONV (postoperative nausea and vomiting)     Vitamin D deficiency      Bariatric surgery in  successful, lost 140 lbs but had a baby last years and gained weight     Social History:  Social History     Socioeconomic History    Marital status: Single   Tobacco Use    Smoking status: Former     Current packs/day: 0.00     Average packs/day: 0.5 packs/day for 6.0 years (3.0 ttl pk-yrs)     Types: Cigarettes, Electronic Cigarette     Start date:      Quit date: 2018     Years since quittin.6    Smokeless tobacco: Never   Vaping Use    Vaping status: Every Day    Last attempt to quit: 2021    Substances: CBD   Substance and Sexual Activity    Alcohol use: Not Currently     Alcohol/week: 0.0 standard drinks of alcohol     Comment: QUIT    Drug use: Yes     Types: Marijuana     Comment: occasionally, quit cocaine and alc    Sexual activity: Defer     Comment: Same sex relationship       Family History:  Family History    Problem Relation Age of Onset    Hypertension Mother     Obesity Mother     Heart disease Mother     Crohn's disease Mother     Heart attack Father     Stroke Father     Diabetes Father        Past Surgical History:  Past Surgical History:   Procedure Laterality Date     SECTION  2016    ENDOSCOPY N/A 2020    Procedure: ESOPHAGOGASTRODUODENOSCOPY WITH BIOPSY X2 AREAS;  Surgeon: Kenney Hodgson MD;  Location: [facility] ENDOSCOPY;  Service: Gastroenterology;  Laterality: N/A;  duodenitis, duodenal ulcers, gastric ulcers    ENDOSCOPY N/A 2021    Procedure: ESOPHAGOGASTRODUODENOSCOPY WITH BIOPSY X2 AREAS;  Surgeon: Kenney Hodgson MD;  Location: [facility] ENDOSCOPY;  Service: Gastroenterology;  Laterality: N/A;  duodenitis and gastric ulcers, ESOPHAGITIS    GASTRIC SLEEVE LAPAROSCOPIC N/A 2021    Procedure: GASTRIC SLEEVE LAPAROSCOPIC WITH DAVINCI ROBOT;  Surgeon: Yesenia Chirinos MD;  Location: [facility] MAIN OR;  Service: Robotics - DaVinci;  Laterality: N/A;    PLANTAR'S WART EXCISION Bilateral     WISDOM TOOTH EXTRACTION         Problem List:  Patient Active Problem List   Diagnosis    Abdominal pain    Acute bronchitis    ADHD, predominantly inattentive type    Breast cyst, right    Gastroesophageal reflux disease    Hypertension    Lumbar strain    Maxillary sinusitis    Moderate bipolar I disorder, most recent episode depressed    Morbid obesity    Plantar wart    Shoulder pain, right    BMI 50.0-59.9, adult    Pre-operative examination    Obesity, Class III, BMI 40-49.9 (morbid obesity)    Need for vaccination against Streptococcus pneumoniae    Sebaceous cyst of breast, right    Class 3 severe obesity with serious comorbidity and body mass index (BMI) of 40.0 to 44.9 in adult    H/O gastric sleeve    History of substance use disorder    Preventative health care    History of alcohol abuse    Generalized anxiety disorder       Allergy:   No Known Allergies     Discontinued Medications:  Medications  Discontinued During This Encounter   Medication Reason    Vraylar 1.5 MG capsule capsule Dose adjustment       Current Medications:   Current Outpatient Medications   Medication Sig Dispense Refill    Cariprazine HCl (Vraylar) 3 MG capsule capsule Take 1 capsule by mouth Daily.      hydrOXYzine (ATARAX) 25 MG tablet Take 1 tablet by mouth 3 (Three) Times a Day As Needed for Anxiety.      lisdexamfetamine (Vyvanse) 30 MG capsule Take 1 capsule by mouth Every Morning 30 capsule 0    medroxyPROGESTERone (Depo-Provera) 150 MG/ML injection Inject 1 mL into the appropriate muscle as directed by prescriber Every 3 (Three) Months. 1 mL 3    omeprazole (priLOSEC) 40 MG capsule Take 1 capsule by mouth Daily. 30 capsule 5    sucralfate (Carafate) 1 g tablet Take 1 tablet by mouth 3 times a day. 90 tablet 0     No current facility-administered medications for this visit.         Psychological ROS: positive for - anxiety, concentration difficulties, depression, irritability, and sleep disturbances  negative for - disorientation, hallucinations, memory difficulties, or suicidal ideation      Physical Exam:   not currently breastfeeding.    Mental Status Exam:   Hygiene:   good  Cooperation:  Cooperative  Eye Contact:  Good  Psychomotor Behavior:  Appropriate  Affect:  Full range and Appropriate  Mood: fluctates  Hopelessness: Denies  Speech:  Normal  Thought Process:  Goal directed and Linear  Thought Content:  Mood congruent  Suicidal:  None  Homicidal:  None  Hallucinations:  None  Delusion:  None  Memory:  Intact  Orientation:  Person, Place, Time, and Situation  Reliability:  good  Insight:  Good  Judgement:  Good  Impulse Control:  Fair  Physical/Medical Issues:  Yes          PHQ-9 Depression Screening    Little interest or pleasure in doing things? 2-->more than half the days   Feeling down, depressed, or hopeless? 2-->more than half the days   Trouble falling or staying asleep, or sleeping too much? 2-->more than half the  days   Feeling tired or having little energy? 1-->several days   Poor appetite or overeating? 0-->not at all   Feeling bad about yourself - or that you are a failure or have let yourself or your family down? 2-->more than half the days   Trouble concentrating on things, such as reading the newspaper or watching television? 3-->nearly every day   Moving or speaking so slowly that other people could have noticed? Or the opposite - being so fidgety or restless that you have been moving around a lot more than usual? 0-->not at all   Thoughts that you would be better off dead, or of hurting yourself in some way? 0-->not at all   PHQ-9 Total Score 12   If you checked off any problems, how difficult have these problems made it for you to do your work, take care of things at home, or get along with other people? very difficult      Over the last two weeks, how often have you been bothered by the following problems?  Feeling nervous, anxious or on edge: Nearly every day  Not being able to stop or control worrying: More than half the days  Worrying too much about different things: Nearly every day  Trouble Relaxing: More than half the days  Being so restless that it is hard to sit still: Not at all  Becoming easily annoyed or irritable: Several days  Feeling afraid as if something awful might happen: More than half the days  GUILLERMINA 7 Total Score: 13  If you checked any problems, how difficult have these problems made it for you to do your work, take care of things at home, or get along with other people: Very difficult      Former smoker    I advised Reema of the risks of tobacco use.     Lab Results:   No visits with results within 3 Month(s) from this visit.   Latest known visit with results is:   Office Visit on 03/13/2024   Component Date Value Ref Range Status    HIV DUO 03/13/2024 Non-Reactive  Non-Reactive Final    HSV 1 IgG, Type Specific 03/13/2024 <0.91  0.00 - 0.90 index Final                                      Negative        <0.91                                   Equivocal 0.91 - 1.09                                   Positive        >1.09   Note: Negative indicates no antibodies detected to   HSV-1. Equivocal may suggest early infection.  If   clinically appropriate, retest at later date. Positive   indicates antibodies detected to HSV-1.    HSV 2 IgG, Type Spec 03/13/2024 <0.91  0.00 - 0.90 index Final                                     Negative        <0.91                                   Equivocal 0.91 - 1.09                                   Positive        >1.09   HSV-2 Antibody Interpretation: Current guidelines and   recommendations do not recommend routine screening   for HSV-2 in asymptomatic individuals, including   those that are pregnant. A negative antibody result   indicates no detectable antibodies to HSV-2 were   found. If recent exposure is suspected, retest in 4   to 6 weeks. Equivocal samples should be retested in   4 to 6 weeks. A positive result indicates the   presence of detectable IgG antibody to HSV-2.   FALSE POSITIVE RESULTS MAY OCCUR. Repeat testing, or   testing by a different method, may be indicated in   some settings (e.g. patients with low likelihood of   HSV infection). If clinically appropriate, retest 4   to 6 weeks later. HSV-2 IgG antibody testing results   should be clinically correlated.    RPR 03/13/2024 Non-Reactive  Non-Reactive Final    Hepatitis B Surface Ag 03/13/2024 Non-Reactive  Non-Reactive Final    Hep A IgM 03/13/2024 Non-Reactive  Non-Reactive Final    Hep B C IgM 03/13/2024 Non-Reactive  Non-Reactive Final    Hepatitis C Ab 03/13/2024 Non-Reactive  Non-Reactive Final    Color 03/13/2024 Dark Yellow  Yellow, Straw, Dark Yellow, Minal Final    Clarity, UA 03/13/2024 Slightly Cloudy (A)  Clear Final    Glucose, UA 03/13/2024 Negative  Negative mg/dL Final    Bilirubin 03/13/2024 Small (1+) (A)  Negative Final    Ketones, UA 03/13/2024 Negative  Negative Final     Specific Gravity  03/13/2024 1.035 (A)  1.005 - 1.030 Final    Blood, UA 03/13/2024 Large (A)  Negative Final    pH, Urine 03/13/2024 5.5  5.0 - 8.0 Final    Protein, POC 03/13/2024 300 mg/dL (A)  Negative mg/dL Final    Urobilinogen, UA 03/13/2024 Normal  Normal, 0.2 E.U./dL Final    Leukocytes 03/13/2024 Negative  Negative Final    Nitrite, UA 03/13/2024 Negative  Negative Final    Color, UA 03/13/2024 Orange (A)  Yellow, Straw Final    Any Substance that causes an abnormal urine color can alter the accuracy of the chemical reactions.    Appearance, UA 03/13/2024 Turbid (A)  Clear Final    pH, UA 03/13/2024 5.5  5.0 - 8.0 Final    Specific Gravity, UA 03/13/2024 1.028  1.005 - 1.030 Final    Glucose, UA 03/13/2024 Negative  Negative Final    Ketones, UA 03/13/2024 Negative  Negative Final    Bilirubin, UA 03/13/2024 Negative  Negative Final    Blood, UA 03/13/2024 Large (3+) (A)  Negative Final    Protein, UA 03/13/2024 100 mg/dL (2+) (A)  Negative Final    Leuk Esterase, UA 03/13/2024 Small (1+) (A)  Negative Final    Nitrite, UA 03/13/2024 Negative  Negative Final    Urobilinogen, UA 03/13/2024 1.0 E.U./dL  0.2 - 1.0 E.U./dL Final    Glucose 03/13/2024 76  65 - 99 mg/dL Final    BUN 03/13/2024 8  6 - 20 mg/dL Final    Creatinine 03/13/2024 0.55 (L)  0.57 - 1.00 mg/dL Final    Sodium 03/13/2024 140  136 - 145 mmol/L Final    Potassium 03/13/2024 3.9  3.5 - 5.2 mmol/L Final    Chloride 03/13/2024 103  98 - 107 mmol/L Final    CO2 03/13/2024 24.2  22.0 - 29.0 mmol/L Final    Calcium 03/13/2024 9.3  8.6 - 10.5 mg/dL Final    BUN/Creatinine Ratio 03/13/2024 14.5  7.0 - 25.0 Final    Anion Gap 03/13/2024 12.8  5.0 - 15.0 mmol/L Final    eGFR 03/13/2024 129.0  >60.0 mL/min/1.73 Final    Hemoglobin A1C 03/13/2024 5.00  4.80 - 5.60 % Final    Chlamydia DNA by PCR 03/13/2024 Not Detected  Not Detected, Invalid Final    Neisseria gonorrhoeae by PCR 03/13/2024 Not Detected  Not Detected Final    Trichomonas vaginalis PCR  03/13/2024 Not Detected  Not Detected, Invalid Final    WBC 03/13/2024 12.22 (H)  3.40 - 10.80 10*3/mm3 Final    RBC 03/13/2024 4.56  3.77 - 5.28 10*6/mm3 Final    Hemoglobin 03/13/2024 12.3  12.0 - 15.9 g/dL Final    Hematocrit 03/13/2024 38.0  34.0 - 46.6 % Final    MCV 03/13/2024 83.3  79.0 - 97.0 fL Final    MCH 03/13/2024 27.0  26.6 - 33.0 pg Final    MCHC 03/13/2024 32.4  31.5 - 35.7 g/dL Final    RDW 03/13/2024 13.2  12.3 - 15.4 % Final    RDW-SD 03/13/2024 39.8  37.0 - 54.0 fl Final    MPV 03/13/2024 10.9  6.0 - 12.0 fL Final    Platelets 03/13/2024 342  140 - 450 10*3/mm3 Final    Neutrophil % 03/13/2024 65.6  42.7 - 76.0 % Final    Lymphocyte % 03/13/2024 26.3  19.6 - 45.3 % Final    Monocyte % 03/13/2024 5.2  5.0 - 12.0 % Final    Eosinophil % 03/13/2024 1.6  0.3 - 6.2 % Final    Basophil % 03/13/2024 0.8  0.0 - 1.5 % Final    Immature Grans % 03/13/2024 0.5  0.0 - 0.5 % Final    Neutrophils, Absolute 03/13/2024 8.02 (H)  1.70 - 7.00 10*3/mm3 Final    Lymphocytes, Absolute 03/13/2024 3.21 (H)  0.70 - 3.10 10*3/mm3 Final    Monocytes, Absolute 03/13/2024 0.64  0.10 - 0.90 10*3/mm3 Final    Eosinophils, Absolute 03/13/2024 0.19  0.00 - 0.40 10*3/mm3 Final    Basophils, Absolute 03/13/2024 0.10  0.00 - 0.20 10*3/mm3 Final    Immature Grans, Absolute 03/13/2024 0.06 (H)  0.00 - 0.05 10*3/mm3 Final    nRBC 03/13/2024 0.0  0.0 - 0.2 /100 WBC Final    HCG, Urine, QL 03/13/2024 Negative  Negative Final    Lot Number 03/13/2024 [id number]   Final    Internal Positive Control 03/13/2024 Passed  Positive, Passed Final    Internal Negative Control 03/13/2024 Passed  Negative, Passed Final    Expiration Date 03/13/2024 12/14/2024   Final    Extra Tube 03/13/2024 Hold for add-ons.   Final    Auto resulted.    RBC, UA 03/13/2024 3-5 (A)  None Seen, 0-2 /HPF Final    WBC, UA 03/13/2024 3-5 (A)  None Seen, 0-2 /HPF Final    Bacteria, UA 03/13/2024 Trace (A)  None Seen /HPF Final    Squamous Epithelial Cells, UA 03/13/2024  3-6 (A)  None Seen, 0-2 /HPF Final    Hyaline Casts, UA 03/13/2024 None Seen  None Seen /LPF Final    Amorphous Crystals, UA 03/13/2024 Large/3+  None Seen /HPF Final    Mucus, UA 03/13/2024 Trace  None Seen, Trace /HPF Final    Methodology 03/13/2024 Manual Light Microscopy   Final       Assessment & Plan   Problems Addressed this Visit       ADHD, predominantly inattentive type (Chronic)    Relevant Medications    Cariprazine HCl (Vraylar) 3 MG capsule capsule    hydrOXYzine (ATARAX) 25 MG tablet    lisdexamfetamine (Vyvanse) 30 MG capsule    Other Relevant Orders    [facility] Abbreviated Urine Drug Screen -    Moderate bipolar I disorder, most recent episode depressed - Primary (Chronic)    Relevant Medications    Cariprazine HCl (Vraylar) 3 MG capsule capsule    hydrOXYzine (ATARAX) 25 MG tablet    lisdexamfetamine (Vyvanse) 30 MG capsule    Other Relevant Orders    [facility] Abbreviated Urine Drug Screen -    Generalized anxiety disorder (Chronic)    Relevant Medications    Cariprazine HCl (Vraylar) 3 MG capsule capsule    hydrOXYzine (ATARAX) 25 MG tablet    lisdexamfetamine (Vyvanse) 30 MG capsule    Other Relevant Orders    [facility] Abbreviated Urine Drug Screen -     Other Visit Diagnoses       Encounter for long-term (current) use of other medications        Relevant Orders    [facility] Abbreviated Urine Drug Screen -          Diagnoses         Codes Comments    Moderate bipolar I disorder, most recent episode depressed    -  Primary ICD-10-CM: F31.32  ICD-9-CM: 296.52     Generalized anxiety disorder     ICD-10-CM: F41.1  ICD-9-CM: 300.02     ADHD, predominantly inattentive type     ICD-10-CM: F90.0  ICD-9-CM: 314.00     Encounter for long-term (current) use of other medications     ICD-10-CM: Z79.899  ICD-9-CM: V58.69             Visit Diagnoses:    ICD-10-CM ICD-9-CM   1. Moderate bipolar I disorder, most recent episode depressed  F31.32 296.52   2. Generalized anxiety disorder  F41.1 300.02   3. ADHD,  predominantly inattentive type  F90.0 314.00   4. Encounter for long-term (current) use of other medications  Z79.899 V58.69       TREATMENT PLAN/GOALS: Continue supportive psychotherapy efforts and medications as indicated. Treatment and medication options discussed during today's visit. Patient ackowledged and verbally consented to continue with current treatment plan and was educated on the importance of compliance with treatment and follow-up appointments.    MEDICATION ISSUES:  INSPECT reviewed as expected - on phentermine 3/6/24   PHQ scored 12 - moderate depression   GUILLERMINA 7 scored 13  Patient screened positive for depression based on a PHQ-9 score of 12 on 9/10/2024. Follow-up recommendations include:  mood stabilizer  .     Bipolar d/o type MRE depressed moderate - cont vraylar 3 mg po QAM, tolerates well, no changes necessary   GUILLERMINA - cont hydroxyzine and therapy at [facility]   ADHD inattentive - start vyvanse 30 mg po QAM, the pt failed strattera and qelbree, was on ritalin and adderall with side effects   Long term therapeutic drug monitoring - UDS today, the pt stated she had THC but will quit when she starts vyvanse       [facility] program - will do pill count     Short term goals - to establish therapeutic relationiship  Long term goal - to improve sxs       Discussed medication options and treatment plan of prescribed medication as well as the risks, benefits, and side effects including potential falls, possible impaired driving and metabolic adversities among others. Patient is agreeable to call the office with any worsening of symptoms or onset of side effects. Patient is agreeable to call 911 or go to the nearest ER should he/she begin having SI/HI. No medication side effects or related complaints today.     MEDS ORDERED DURING VISIT:  New Medications Ordered This Visit   Medications    Cariprazine HCl (Vraylar) 3 MG capsule capsule     Sig: Take 1 capsule by mouth Daily.    hydrOXYzine (ATARAX) 25 MG tablet      Sig: Take 1 tablet by mouth 3 (Three) Times a Day As Needed for Anxiety.    lisdexamfetamine (Vyvanse) 30 MG capsule     Sig: Take 1 capsule by mouth Every Morning     Dispense:  30 capsule     Refill:  0       Return in about 4 weeks (around 10/8/2024).           This document has been electronically signed by Radha Hinton MD  September 10, 2024 10:41 EDT    Part of this note may be an electronic transcription/translation of spoken language to printed text using the Dragon Dictation System.

## 2024-09-23 ENCOUNTER — OFFICE VISIT (OUTPATIENT)
Dept: FAMILY MEDICINE CLINIC | Facility: CLINIC | Age: 29
End: 2024-09-23
Payer: MEDICAID

## 2024-09-23 VITALS
DIASTOLIC BLOOD PRESSURE: 70 MMHG | RESPIRATION RATE: 18 BRPM | BODY MASS INDEX: 48.33 KG/M2 | HEART RATE: 97 BPM | HEIGHT: 61 IN | TEMPERATURE: 99.2 F | SYSTOLIC BLOOD PRESSURE: 116 MMHG | OXYGEN SATURATION: 99 % | WEIGHT: 256 LBS

## 2024-09-23 DIAGNOSIS — F31.32 MODERATE BIPOLAR I DISORDER, MOST RECENT EPISODE DEPRESSED: Chronic | ICD-10-CM

## 2024-09-23 DIAGNOSIS — F41.8 MIXED ANXIETY DEPRESSIVE DISORDER: ICD-10-CM

## 2024-09-23 DIAGNOSIS — Z87.19 HISTORY OF DUODENAL ULCER: ICD-10-CM

## 2024-09-23 DIAGNOSIS — K21.00 GASTROESOPHAGEAL REFLUX DISEASE WITH ESOPHAGITIS WITHOUT HEMORRHAGE: Primary | ICD-10-CM

## 2024-09-23 DIAGNOSIS — E66.01 OBESITY, CLASS III, BMI 40-49.9 (MORBID OBESITY): ICD-10-CM

## 2024-09-23 DIAGNOSIS — F41.1 GENERALIZED ANXIETY DISORDER: Chronic | ICD-10-CM

## 2024-09-23 DIAGNOSIS — Z90.3 H/O GASTRIC SLEEVE: ICD-10-CM

## 2024-09-23 PROCEDURE — 3078F DIAST BP <80 MM HG: CPT | Performed by: NURSE PRACTITIONER

## 2024-09-23 PROCEDURE — 99214 OFFICE O/P EST MOD 30 MIN: CPT | Performed by: NURSE PRACTITIONER

## 2024-09-23 PROCEDURE — 3074F SYST BP LT 130 MM HG: CPT | Performed by: NURSE PRACTITIONER

## 2024-09-23 PROCEDURE — 1126F AMNT PAIN NOTED NONE PRSNT: CPT | Performed by: NURSE PRACTITIONER

## 2024-09-23 RX ORDER — VONOPRAZAN FUMARATE 13.36 MG/1
10 TABLET ORAL DAILY
Qty: 30 TABLET | Refills: 2 | Status: SHIPPED | OUTPATIENT
Start: 2024-09-23

## 2024-09-30 ENCOUNTER — TELEPHONE (OUTPATIENT)
Dept: BARIATRICS/WEIGHT MGMT | Facility: CLINIC | Age: 29
End: 2024-09-30
Payer: MEDICAID

## 2024-10-10 ENCOUNTER — TELEPHONE (OUTPATIENT)
Dept: FAMILY MEDICINE CLINIC | Facility: CLINIC | Age: 29
End: 2024-10-10
Payer: MEDICAID

## 2024-10-10 NOTE — TELEPHONE ENCOUNTER
Caller: Reema Schumacher    Relationship: Self    Best call back number: [phone]    LAST TIME PATIENT WAS IN OFFICE WE WERE GOING TO PRESCRIBE HER A MEDICATION TO HELP WITH STOMACH ULCERS BUT SHE NEVER RECEIVED THIS MEDICATION.    REQUESTING IT GETS SENT TO :      [facility] [id number] [facility] [place] - [address] [facility] - [phone]  [phone]     PLEASE CALL AND ADVISE PATIENT

## 2024-10-11 NOTE — TELEPHONE ENCOUNTER
Spoke with patient and made her aware that a PA is needed for this medication. I did submit the PA with her failure information. Advised pt that we would contact her once we get a response. She expressed understanding.

## 2024-10-25 RX ORDER — ESOMEPRAZOLE MAGNESIUM 40 MG/1
40 CAPSULE, DELAYED RELEASE ORAL
Qty: 30 CAPSULE | Refills: 1 | Status: SHIPPED | OUTPATIENT
Start: 2024-10-25

## 2024-12-26 ENCOUNTER — TELEPHONE (OUTPATIENT)
Dept: FAMILY MEDICINE CLINIC | Facility: CLINIC | Age: 29
End: 2024-12-26
Payer: MEDICAID

## 2024-12-26 DIAGNOSIS — Z78.9 USES DEPOT MEDROXYPROGESTERONE ACETATE AS PRIMARY BIRTH CONTROL METHOD: ICD-10-CM

## 2024-12-26 RX ORDER — MEDROXYPROGESTERONE ACETATE 150 MG/ML
INJECTION, SUSPENSION INTRAMUSCULAR
Qty: 1 ML | Refills: 3 | Status: SHIPPED | OUTPATIENT
Start: 2024-12-26

## 2024-12-26 NOTE — TELEPHONE ENCOUNTER
Attempted to reach Reema Schumacher voicemail box full regarding referral to gastroenterology. Inquiring if has been scheduled or has been seen     Relay

## 2025-01-09 ENCOUNTER — PRIOR AUTHORIZATION (OUTPATIENT)
Dept: FAMILY MEDICINE CLINIC | Facility: CLINIC | Age: 30
End: 2025-01-09
Payer: MEDICAID

## 2025-01-09 NOTE — TELEPHONE ENCOUNTER
Reema stated that she has tried esomeprazole as designated by denial of ana. States she continues to have heartburn. PA resubmitted via faxed request- unable to submit via [employer].

## 2025-01-17 RX ORDER — ESOMEPRAZOLE MAGNESIUM 40 MG/1
40 CAPSULE, DELAYED RELEASE ORAL
Qty: 30 CAPSULE | Refills: 1 | Status: SHIPPED | OUTPATIENT
Start: 2025-01-17

## 2025-02-21 ENCOUNTER — TELEPHONE (OUTPATIENT)
Dept: FAMILY MEDICINE CLINIC | Facility: CLINIC | Age: 30
End: 2025-02-21
Payer: MEDICAID

## 2025-04-10 PROBLEM — F31.9 BIPOLAR 1 DISORDER: Status: ACTIVE | Noted: 2022-06-01

## 2025-04-10 PROBLEM — N60.01 BREAST CYST, RIGHT: Status: RESOLVED | Noted: 2017-08-24 | Resolved: 2025-04-10

## 2025-04-10 PROCEDURE — 87086 URINE CULTURE/COLONY COUNT: CPT | Performed by: FAMILY MEDICINE

## 2025-05-06 RX ORDER — OMEPRAZOLE 40 MG/1
40 CAPSULE, DELAYED RELEASE ORAL DAILY
Qty: 30 CAPSULE | Refills: 2 | Status: SHIPPED | OUTPATIENT
Start: 2025-05-06

## 2025-08-21 ENCOUNTER — ANESTHESIA EVENT (OUTPATIENT)
Dept: GASTROENTEROLOGY | Facility: HOSPITAL | Age: 30
End: 2025-08-21
Payer: OTHER GOVERNMENT

## 2025-08-21 ENCOUNTER — ANESTHESIA (OUTPATIENT)
Dept: GASTROENTEROLOGY | Facility: HOSPITAL | Age: 30
End: 2025-08-21
Payer: OTHER GOVERNMENT

## 2025-08-21 ENCOUNTER — ON CAMPUS - OUTPATIENT (OUTPATIENT)
Dept: URBAN - METROPOLITAN AREA HOSPITAL 85 | Facility: HOSPITAL | Age: 30
End: 2025-08-21
Payer: COMMERCIAL

## 2025-08-21 ENCOUNTER — HOSPITAL ENCOUNTER (OUTPATIENT)
Facility: HOSPITAL | Age: 30
Setting detail: HOSPITAL OUTPATIENT SURGERY
Discharge: HOME | End: 2025-08-21
Attending: INTERNAL MEDICINE | Admitting: INTERNAL MEDICINE
Payer: OTHER GOVERNMENT

## 2025-08-21 DIAGNOSIS — Z87.19 PERSONAL HISTORY OF OTHER DISEASES OF THE DIGESTIVE SYSTEM: ICD-10-CM

## 2025-08-21 DIAGNOSIS — K29.50 UNSPECIFIED CHRONIC GASTRITIS WITHOUT BLEEDING: ICD-10-CM

## 2025-08-21 DIAGNOSIS — K62.89 OTHER SPECIFIED DISEASES OF ANUS AND RECTUM: ICD-10-CM

## 2025-08-21 DIAGNOSIS — Z91.199 PATIENT'S NONCOMPLIANCE WITH OTHER MEDICAL TREATMENT AND REG: ICD-10-CM

## 2025-08-21 DIAGNOSIS — R11.2 NAUSEA WITH VOMITING, UNSPECIFIED: ICD-10-CM

## 2025-08-21 DIAGNOSIS — K31.A14 GASTRIC INTESTINAL METAPLASIA WITHOUT DYSPLASIA, INVOLVING T: ICD-10-CM

## 2025-08-21 DIAGNOSIS — Z98.84 BARIATRIC SURGERY STATUS: ICD-10-CM

## 2025-08-21 DIAGNOSIS — R13.10 DYSPHAGIA, UNSPECIFIED: ICD-10-CM

## 2025-08-21 DIAGNOSIS — K58.0 IRRITABLE BOWEL SYNDROME WITH DIARRHEA: ICD-10-CM

## 2025-08-21 DIAGNOSIS — K21.00 GASTRO-ESOPHAGEAL REFLUX DISEASE WITH ESOPHAGITIS, WITHOUT B: ICD-10-CM

## 2025-08-21 PROCEDURE — 43450 DILATE ESOPHAGUS 1/MULT PASS: CPT | Performed by: INTERNAL MEDICINE

## 2025-08-21 PROCEDURE — 43239 EGD BIOPSY SINGLE/MULTIPLE: CPT | Performed by: INTERNAL MEDICINE

## 2025-08-21 PROCEDURE — 25010000002 LIDOCAINE PF 2% 2 % SOLUTION: Performed by: NURSE ANESTHETIST, CERTIFIED REGISTERED

## 2025-08-21 PROCEDURE — 45380 COLONOSCOPY AND BIOPSY: CPT | Performed by: INTERNAL MEDICINE

## 2025-08-21 PROCEDURE — 25010000002 PROPOFOL 10 MG/ML EMULSION: Performed by: NURSE ANESTHETIST, CERTIFIED REGISTERED

## 2025-08-21 RX ORDER — LIDOCAINE HYDROCHLORIDE 20 MG/ML
INJECTION, SOLUTION EPIDURAL; INFILTRATION; INTRACAUDAL; PERINEURAL AS NEEDED
Status: DISCONTINUED | OUTPATIENT
Start: 2025-08-21 | End: 2025-08-21 | Stop reason: SURG

## 2025-08-21 RX ORDER — EPHEDRINE SULFATE 5 MG/ML
INJECTION INTRAVENOUS AS NEEDED
Status: DISCONTINUED | OUTPATIENT
Start: 2025-08-21 | End: 2025-08-21 | Stop reason: SURG

## 2025-08-21 RX ORDER — PROPOFOL 10 MG/ML
VIAL (ML) INTRAVENOUS AS NEEDED
Status: DISCONTINUED | OUTPATIENT
Start: 2025-08-21 | End: 2025-08-21 | Stop reason: SURG

## 2025-08-21 RX ADMIN — EPHEDRINE SULFATE 10 MG: 5 INJECTION INTRAVENOUS at 13:03

## 2025-08-21 RX ADMIN — PROPOFOL 50 MG: 10 INJECTION, EMULSION INTRAVENOUS at 12:48

## 2025-08-21 RX ADMIN — EPHEDRINE SULFATE 10 MG: 5 INJECTION INTRAVENOUS at 13:01

## 2025-08-21 RX ADMIN — PROPOFOL 50 MG: 10 INJECTION, EMULSION INTRAVENOUS at 12:58

## 2025-08-21 RX ADMIN — PROPOFOL 50 MG: 10 INJECTION, EMULSION INTRAVENOUS at 13:05

## 2025-08-21 RX ADMIN — EPHEDRINE SULFATE 10 MG: 5 INJECTION INTRAVENOUS at 13:06

## 2025-08-21 RX ADMIN — EPHEDRINE SULFATE 10 MG: 5 INJECTION INTRAVENOUS at 12:52

## 2025-08-21 RX ADMIN — PROPOFOL 50 MG: 10 INJECTION, EMULSION INTRAVENOUS at 12:50

## 2025-08-21 RX ADMIN — PROPOFOL 50 MG: 10 INJECTION, EMULSION INTRAVENOUS at 12:51

## 2025-08-21 RX ADMIN — PROPOFOL 50 MG: 10 INJECTION, EMULSION INTRAVENOUS at 13:01

## 2025-08-21 RX ADMIN — EPHEDRINE SULFATE 10 MG: 5 INJECTION INTRAVENOUS at 13:04

## 2025-08-21 RX ADMIN — LIDOCAINE HYDROCHLORIDE 80 MG: 20 INJECTION, SOLUTION EPIDURAL; INFILTRATION; INTRACAUDAL; PERINEURAL at 12:44

## 2025-08-21 RX ADMIN — PROPOFOL 50 MG: 10 INJECTION, EMULSION INTRAVENOUS at 12:54

## 2025-08-21 RX ADMIN — PROPOFOL 100 MG: 10 INJECTION, EMULSION INTRAVENOUS at 12:44

## (undated) DEVICE — 40580 - THE PINK PAD - ADVANCED TRENDELENBURG POSITIONING KIT: Brand: 40580 - THE PINK PAD - ADVANCED TRENDELENBURG POSITIONING KIT

## (undated) DEVICE — ENDOPATH XCEL WITH OPTIVIEW TECHNOLOGY UNIVERSAL TROCAR STABILITY SLEEVES: Brand: ENDOPATH XCEL OPTIVIEW

## (undated) DEVICE — 2, DISPOSABLE SUCTION/IRRIGATOR WITH DISPOSABLE TIP: Brand: STRYKEFLOW

## (undated) DEVICE — BLADELESS OBTURATOR: Brand: WECK VISTA

## (undated) DEVICE — ARM DRAPE

## (undated) DEVICE — COLUMN DRAPE

## (undated) DEVICE — DRP SURG UNIV BASIC BILAMINATE 53X77IN DISP

## (undated) DEVICE — SOL IRRIG NACL 1000ML

## (undated) DEVICE — BITEBLOCK ENDO W/STRAP 60F A/ LF DISP

## (undated) DEVICE — VESSEL SEALER EXTEND: Brand: ENDOWRIST

## (undated) DEVICE — REDUCER: Brand: ENDOWRIST

## (undated) DEVICE — UNDRGLV SURG BIOGEL PIMICROINDICATOR SYNTH SZ8 LF STRL

## (undated) DEVICE — SINGLE-USE BIOPSY FORCEPS: Brand: RADIAL JAW 4

## (undated) DEVICE — SYR LUERLOK 50ML

## (undated) DEVICE — CFF SCD HEMFRCE SEQ CLF STD XL

## (undated) DEVICE — LAPAROSCOPIC GAS CONDITIONING DEVICE.: Brand: INSUFLOW

## (undated) DEVICE — SUT PDS 3/0 SH 27IN DYED Z316H

## (undated) DEVICE — PK ENDO GI 50

## (undated) DEVICE — CANNULA SEAL

## (undated) DEVICE — ERBE NESSY®PLATE 170 SPLIT; 168CM²; CABLE 3M: Brand: ERBE

## (undated) DEVICE — PASS SUT PRO BARIATRIC XL W/TROC SWABS

## (undated) DEVICE — PK BARIATRIC 50

## (undated) DEVICE — ADHS SKIN PREMIERPRO EXOFIN TOPICAL HI/VISC .5ML

## (undated) DEVICE — PAPR PRNT PK SONY W RIBN UPC55

## (undated) DEVICE — GLV SURG SENSICARE SLT PF LF 7.5 STRL

## (undated) DEVICE — SEAL

## (undated) DEVICE — COVER,MAYO STAND,STERILE: Brand: MEDLINE

## (undated) DEVICE — SLV SCD CALF HEMOFORCE DVT THERP REPROC MD

## (undated) DEVICE — STAPLER 60: Brand: SUREFORM

## (undated) DEVICE — KT SURG TURNOVER 050